# Patient Record
Sex: FEMALE | Race: WHITE | HISPANIC OR LATINO | URBAN - METROPOLITAN AREA
[De-identification: names, ages, dates, MRNs, and addresses within clinical notes are randomized per-mention and may not be internally consistent; named-entity substitution may affect disease eponyms.]

---

## 2017-12-18 ENCOUNTER — OFFICE VISIT (OUTPATIENT)
Dept: NEUROSURGERY | Facility: CLINIC | Age: 37
End: 2017-12-18

## 2017-12-18 VITALS
WEIGHT: 184.4 LBS | DIASTOLIC BLOOD PRESSURE: 72 MMHG | TEMPERATURE: 97.8 F | HEIGHT: 69 IN | SYSTOLIC BLOOD PRESSURE: 102 MMHG | BODY MASS INDEX: 27.31 KG/M2

## 2017-12-18 DIAGNOSIS — G89.29 CHRONIC NONINTRACTABLE HEADACHE, UNSPECIFIED HEADACHE TYPE: ICD-10-CM

## 2017-12-18 DIAGNOSIS — R51.9 CHRONIC NONINTRACTABLE HEADACHE, UNSPECIFIED HEADACHE TYPE: ICD-10-CM

## 2017-12-18 DIAGNOSIS — D32.9 MENINGIOMA (HCC): Primary | ICD-10-CM

## 2017-12-18 PROCEDURE — 99244 OFF/OP CNSLTJ NEW/EST MOD 40: CPT | Performed by: NEUROLOGICAL SURGERY

## 2017-12-18 RX ORDER — ALPRAZOLAM 0.5 MG/1
TABLET ORAL
Refills: 0 | COMMUNITY
Start: 2017-12-04 | End: 2022-06-13 | Stop reason: SDUPTHER

## 2017-12-18 RX ORDER — LEVONORGESTREL / ETHINYL ESTRADIOL AND ETHINYL ESTRADIOL 150-30(84)
KIT ORAL
COMMUNITY
Start: 2017-10-10 | End: 2022-03-11

## 2017-12-18 RX ORDER — FLUTICASONE PROPIONATE 50 MCG
SPRAY, SUSPENSION (ML) NASAL
Refills: 1 | COMMUNITY
Start: 2017-10-10 | End: 2022-03-11 | Stop reason: CLARIF

## 2017-12-18 RX ORDER — HYDROCODONE BITARTRATE AND ACETAMINOPHEN 7.5; 325 MG/1; MG/1
TABLET ORAL
COMMUNITY
Start: 2017-11-07 | End: 2019-11-11

## 2017-12-18 RX ORDER — CYCLOBENZAPRINE HCL 10 MG
TABLET ORAL
Refills: 0 | COMMUNITY
Start: 2017-11-27 | End: 2018-05-14

## 2017-12-18 RX ORDER — BUTALBITAL, ACETAMINOPHEN AND CAFFEINE 50; 325; 40 MG/1; MG/1; MG/1
TABLET ORAL
Refills: 0 | COMMUNITY
Start: 2017-12-10 | End: 2018-05-14

## 2017-12-18 RX ORDER — FOLIC ACID 1 MG/1
TABLET ORAL
Refills: 11 | COMMUNITY
Start: 2017-12-01 | End: 2018-05-14

## 2017-12-18 NOTE — PROGRESS NOTES
Subjective     Chief Complaint: Meningioma    Patient ID: Sylvia Douglass is a 37 y.o. female seen for consultation today at the request of  Lisset Vickers*    Headache    This is a chronic problem. The current episode started more than 1 year ago. The problem occurs intermittently. The problem has been waxing and waning. The pain is located in the bilateral region. The pain does not radiate. The pain quality is similar to prior headaches. The quality of the pain is described as aching and pulsating. The pain is at a severity of 10/10. The pain is severe. Associated symptoms include back pain, dizziness, neck pain, photophobia and vomiting. Pertinent negatives include no abdominal pain, coughing, ear pain, eye pain, eye redness, fever, hearing loss, nausea, numbness, rhinorrhea, seizures, sinus pressure, sore throat, tinnitus or weakness. Nothing aggravates the symptoms. Treatments tried: Marijuana. The treatment provided significant relief. Her past medical history is significant for migraine headaches.       This is a 37-year-old woman who presents to my clinic with chief complaints of migraines, dizziness, and an asymptomatic meningioma.  She was initially diagnosed with a meningioma in 2012.  She was followed at the [facility] with annual MRIs.  She has some neurological complaints which are probably not related to her meningioma.  She has a long-standing history of migraines, which have gotten somewhat worse over the course of the last few years.  She also endorses occasional dizziness/lightheadedness, for example when she bends over to tie her shoes.  She has refused to take migraine medicine or pain medicine in the past, and self medicated with marijuana.  She states that with regular marijuana use, she is able to control her migraine symptoms completely.    She denies any seizures, or strokelike symptoms.  She did have Bell's palsy on the right side when she was younger, and has been  left with a very subtle right-sided facial droop.    The following portions of the patient's history were reviewed and updated as appropriate: allergies, current medications, past family history, past medical history, past social history, past surgical history and problem list.    Family history:   Family History   Problem Relation Age of Onset   • Stroke Mother    • Dementia Maternal Grandmother    • Cancer Maternal Grandfather        Social history:   Social History     Social History   • Marital status: Unknown     Spouse name: N/A   • Number of children: N/A   • Years of education: N/A     Occupational History   • Not on file.     Social History Main Topics   • Smoking status: Never Smoker   • Smokeless tobacco: Never Used   • Alcohol use No   • Drug use: Yes     Special: Marijuana   • Sexual activity: Defer     Other Topics Concern   • Not on file     Social History Narrative   • No narrative on file       Review of Systems   Constitutional: Positive for activity change, diaphoresis, fatigue and unexpected weight change. Negative for appetite change, chills and fever.   HENT: Positive for drooling. Negative for congestion, dental problem, ear discharge, ear pain, facial swelling, hearing loss, mouth sores, nosebleeds, postnasal drip, rhinorrhea, sinus pressure, sneezing, sore throat, tinnitus, trouble swallowing and voice change.    Eyes: Positive for photophobia and visual disturbance. Negative for pain, discharge, redness and itching.   Respiratory: Negative for apnea, cough, choking, chest tightness, shortness of breath, wheezing and stridor.    Cardiovascular: Negative for chest pain, palpitations and leg swelling.   Gastrointestinal: Positive for vomiting. Negative for abdominal distention, abdominal pain, anal bleeding, blood in stool, constipation, diarrhea, nausea and rectal pain.   Endocrine: Negative for cold intolerance, heat intolerance, polydipsia, polyphagia and polyuria.   Genitourinary: Negative  "for decreased urine volume, difficulty urinating, dysuria, enuresis, flank pain, frequency, genital sores, hematuria and urgency.   Musculoskeletal: Positive for back pain, neck pain and neck stiffness. Negative for arthralgias, gait problem, joint swelling and myalgias.   Skin: Negative for color change, pallor, rash and wound.   Allergic/Immunologic: Positive for environmental allergies. Negative for food allergies and immunocompromised state.   Neurological: Positive for dizziness, speech difficulty, light-headedness and headaches. Negative for tremors, seizures, syncope, facial asymmetry, weakness and numbness.   Hematological: Negative for adenopathy. Bruises/bleeds easily.   Psychiatric/Behavioral: Negative for agitation, behavioral problems, confusion, decreased concentration, dysphoric mood, hallucinations, self-injury, sleep disturbance and suicidal ideas. The patient is not nervous/anxious and is not hyperactive.        Objective   Blood pressure 102/72, temperature 97.8 °F (36.6 °C), height 175.3 cm (69\"), weight 83.6 kg (184 lb 6.4 oz).  Body mass index is 27.23 kg/(m^2).    Physical Exam   Constitutional: She is oriented to person, place, and time. She appears well-developed and well-nourished.  Non-toxic appearance. No distress.   HENT:   Head: Normocephalic and atraumatic.   Mouth/Throat: Oropharynx is clear and moist.   Eyes: EOM are normal. Pupils are equal, round, and reactive to light. Right eye exhibits no discharge. Left eye exhibits no discharge.   Neck: Phonation normal. No JVD present. No tracheal deviation present. No thyromegaly present.   Cardiovascular: Normal rate and regular rhythm.    Pulmonary/Chest: Effort normal. No stridor. No respiratory distress. She has no wheezes.   Abdominal: Soft. Normal appearance. She exhibits no distension. There is no tenderness.   Musculoskeletal: She exhibits no edema.   Muscle Group     L          R  Deltoid                5          5  Bicep           "        5          5  Tricep                 5          5                       5          5  Hand IO              5          5  Hip Flexor           5          5  Knee Extensor   5          5     ADF                    5          5  APF                    5          5      Neurological: She is alert and oriented to person, place, and time. She has normal reflexes. She displays normal reflexes. No cranial nerve deficit or sensory deficit. She exhibits normal muscle tone. She displays a negative Romberg sign. Coordination and gait normal. GCS eye subscore is 4. GCS verbal subscore is 5. GCS motor subscore is 6.   Reflex Scores:       Tricep reflexes are 2+ on the right side and 2+ on the left side.       Bicep reflexes are 2+ on the right side and 2+ on the left side.       Brachioradialis reflexes are 2+ on the right side and 2+ on the left side.       Patellar reflexes are 2+ on the right side and 2+ on the left side.       Achilles reflexes are 2+ on the right side and 2+ on the left side.  CN II-XII grossly intact with the exception of very mild right peripheral cranial nerve VII asymmetry    No pronator drift. FTN testing performed without dysmetria or bradykinesia.   Skin: Skin is warm and dry. She is not diaphoretic. No erythema.   Psychiatric: She has a normal mood and affect. Her speech is normal and behavior is normal. Judgment and thought content normal.   Nursing note and vitals reviewed.        Assessment/Plan     Independent Review of Radiographic Studies:      Available for my review is a MRI of the brain with and without contrast that was performed on 8/1/12, 6/15/15, and 9/8/17.  There is a small, homogeneously enhancing lesion which is situated adjacent to the superior sagittal sinus on the left side, and the posterior left frontal lobe.  The dimensions on the study from 8/1/12 are 9.3 x 7.9 x 7.9 mm.  The dimensions on the study from 6/15/15 are 1.3 x 1.19 mm.  The dimensions on the study from  9/8/17 are 1.01 x 1.08 x 1.03 cm     Medical Decision Making:      This is a 37-year-old woman with an asymptomatic meningioma that may have grown very slightly from 2012 2 2015, but appears to have stabilized in terms of any growth over the course of the last 2 years.  Her complaints and symptoms are not related to this very small meningioma, which is not causing any radiographic evidence of intracranial mass effect.    She could reasonably have this meningioma removed if it is traumatic are concerning to her, however at this point I'm comfortable with continuing her annual MRIs.  I reviewed the signs and symptoms of rapid tumor growth, including the signs and symptoms of intracranial mass effect and seizures.    Otherwise, my recommendations are for her to finish her grad school, and get established with a headache neurologist here in town who can help get her migraine/chronic daily headaches under control.    If she elects to proceed with surgery, I asked her to contact my office, otherwise I think it's reasonable to repeat a MRI of the brain with and without contrast in September 2018.    I'm happy to follow her for her meningioma from here on out, and it sounds like [facility] will [facility]    Sylvia was seen today for brain tumor.    Diagnoses and all orders for this visit:    Meningioma  -     MRI Brain With & Without Contrast; Future    Chronic nonintractable headache, unspecified headache type  -     Ambulatory Referral to Neurology    Other orders  -     SCANNED - IMAGING  -     SCANNED - LABS      Return in about 1 year (around 12/18/2018).           This document signed by DAPHNEY Wright MD December 18, 2017 4:06 PM

## 2018-03-05 ENCOUNTER — TELEPHONE (OUTPATIENT)
Dept: NEUROLOGY | Facility: CLINIC | Age: 38
End: 2018-03-05

## 2018-03-05 NOTE — TELEPHONE ENCOUNTER
----- Message from Alexandrea Gupta sent at 3/5/2018 11:17 AM EST -----  Regarding: JESSICAHenrry HAYDENMORILLO  NEW PATIENT HAD TO RESCHEDULE, AND WAS VERY UPSET.  SHE HAS HAD HEADACHES, AND CAN'T AFFORD A ER VISIT.  SHE STRESSED THAT SHE IS NOT WANTING TO TAKE PILLS, THAT SHE WOULD LIKE TO RESOLVE THE ISSUES.  I TOLD HER I WOULD PASS THAT INFORMATION ALONG.

## 2018-03-07 NOTE — TELEPHONE ENCOUNTER
Unfortunately she can call every day if she would like to see if we have a cancellation. Provider had an uncontrollable emergency and I am sorry she had to be rescheduled but it was unavoidable.

## 2018-03-08 ENCOUNTER — TELEPHONE (OUTPATIENT)
Dept: NEUROSURGERY | Facility: CLINIC | Age: 38
End: 2018-03-08

## 2018-03-08 DIAGNOSIS — R51.9 FREQUENT HEADACHES: Primary | ICD-10-CM

## 2018-03-08 NOTE — TELEPHONE ENCOUNTER
She'll be referred to any neurologist who deals with headaches.    Sae or Keira would be up other good options

## 2018-03-08 NOTE — TELEPHONE ENCOUNTER
Patient called back and wanted to know if there was a reason why Dr. Wright referred her to Niki Payan? Her appointment was cancelled at the last minute and she can't be seen again till May.   Sylvia has been having really bad headaches and has missed a lot of work and had to use FMLA. She wants to know if there is anything we can do to get her seen sooner so she doesn't have to keep missing work.

## 2018-03-08 NOTE — TELEPHONE ENCOUNTER
Provider:  Kyle  Caller: Sylvia Douglass  Time of call:   01:32 PM  Phone #:  [phone]  Last visit:   12/18/17  Next visit: none    Reason for call:       Pt called in and left  that she has a question about the referral that Dr. Wright placed, no details.     Called pt back, went straight to , left message requesting that she call back and leave more detailed vm

## 2018-03-09 ENCOUNTER — TELEPHONE (OUTPATIENT)
Dept: NEUROLOGY | Facility: CLINIC | Age: 38
End: 2018-03-09

## 2018-03-09 ENCOUNTER — OFFICE VISIT (OUTPATIENT)
Dept: NEUROLOGY | Facility: CLINIC | Age: 38
End: 2018-03-09

## 2018-03-09 VITALS
WEIGHT: 170 LBS | BODY MASS INDEX: 25.18 KG/M2 | HEIGHT: 69 IN | HEART RATE: 68 BPM | SYSTOLIC BLOOD PRESSURE: 117 MMHG | DIASTOLIC BLOOD PRESSURE: 73 MMHG | OXYGEN SATURATION: 100 %

## 2018-03-09 DIAGNOSIS — G43.719 INTRACTABLE CHRONIC MIGRAINE WITHOUT AURA AND WITHOUT STATUS MIGRAINOSUS: ICD-10-CM

## 2018-03-09 DIAGNOSIS — H53.9 TRANSIENT VISION DISTURBANCE: Primary | ICD-10-CM

## 2018-03-09 PROCEDURE — 99204 OFFICE O/P NEW MOD 45 MIN: CPT | Performed by: NURSE PRACTITIONER

## 2018-03-09 RX ORDER — CETIRIZINE HYDROCHLORIDE 5 MG/1
5 TABLET ORAL DAILY
COMMUNITY
End: 2022-03-11

## 2018-03-09 NOTE — TELEPHONE ENCOUNTER
I do FMLA for migraines but it is only PRN. We don't do disability for migraines. Also the [facility] is the more holistic headache clinic here in town and she can call for an appointment as a self-referral. She was wanting this info. thanks

## 2018-03-09 NOTE — TELEPHONE ENCOUNTER
Pt said she was currrently seeing Dr. Loredo and he was handling her FMLA paperwork but if she were to stop seeing him would you be the one to take over the paperwork?

## 2018-03-09 NOTE — PROGRESS NOTES
"Subjective:     Patient ID: Sylvia Douglass is a 37 y.o. female.    CC:   Chief Complaint   Patient presents with   • Headache       HPI:   History of Present Illness   This is a 37-year-old female who presents to establish care with new neurologist for chronic headaches and migraines since childhood.  She was referred by Dr. Harley Wright with neurosurgery after being evaluated for meningioma found originally on the MRI of her brain in 2014 and appeared stable on her MRI of her brain from 9/8/2017 in the left side as well as chronic minimal small vessel ischemic changes.  For now the plan is to repeat her MRI of the brain in September 2018 and follow-up with Dr. Wright at that time.  She tells me that she has had migraine headaches since she was a child.  She tells me that she has previously been treated by multiple neurologists and had multiple CT and MRI scans.  She tells me from age [age] her migraines stopped completely when she started smoking marijuana.  She tells me over the past one and half to 2 years her headaches have returned with a vengeance.  She tells me that she was previously seen at  neurology and was recommended that she have surgical removal of her meningioma, but with second opinion she is going to wait on this.  She tells me that she is having constant migraines over the past one and half to 2 years.  She tells me that she is sensitive to light, sound, not sleeping and not eating foods make her symptoms worse.  She tells me that recently she is having even more concerning symptoms of intermittent slurred speech, intermittent dizziness, intermittent near passing out or \"blackout spells\".  She tells me that her vision intermittently becomes blurred.  She tells me that her symptoms typically are not during her worst migraines but now she is having headaches almost every day so it is difficult to tell at times.  She tells me that she has been diagnosed with TMJ and has received acupuncture " in the past for this.  She tells me that she has previously tried Imitrex nasal, oral and injectables.  She tells me she tried propranolol, Depakote, amitriptyline, Fioricet, Relpax and currently taking Lortab by Dr. Loredo with comprehensive pain management.  She has also tried CoQ10 & riboflavin but has not tried magnesium.  She is unable to take Topamax since she has a history of kidney stones.  She is wondering about using CBD oil for migraine prevention.  She tells me she also has dizziness usually when she is standing but not necessarily related to position changes.  She tells me she has difficulty with concentrating.  She tells me that her headaches used to always be in the front and now they've moved all over her head.  She has throbbing, pulsating, pressure, squeezing, sharp, stabbing, dull and achy pain.  She tells me she has a headache every day and that her severe migraines last 3 or more days at 10 out of 10 pain.  She tells me that she is not really looking for medications but more natural alternatives to treat her headaches.  She tells me that her mother and grandmother do have a history of mini strokes.  She tells me that she does have severe anxiety.  She did bring today a copy of her Pharmacogenomic testing and results.  She tells me that she has multiple allergies to medications and sensitivities to medications and is fearful to take several medications due to reactions in the past.  She does have MTHFR and does take folic acid for this. Has a history of Bell's Palsy as a teenager. History of Gastric Bypass in 2004 and cannot take NSAIDs.    This patient experiences 30/30 headache days per month with at least 20 days being severe migraine headaches. Migraine headaches last >4hour/day and have been present for greater than 6 consecutive months. Characteristics of migraines include at least 2 of the following: unilateral, pulsating, moderate to severe intensity, worsened by physical exertion,  photophobia, phonophobia, nausea and/or vomiting. Patient has tried and failed medications for migraine prevention for 3 months or greater: Examples: Topamax (cannot take due to kidney stones), Amitriptyline, Imitrex, Propranolol, Relpax, Maxalt, SSRIs, BBs, AEDs, NSAIDs, Muscle Relaxers, Tylenol etc.    The following portions of the patient's history were reviewed and updated as appropriate: allergies, current medications, past family history, past medical history, past social history, past surgical history and problem list.    Past Medical History:   Diagnosis Date   • Benign meningioma of brain 2014    followed by Neurosurgery   • Endometriosis    • Kidney stones        Past Surgical History:   Procedure Laterality Date   • ANKLE SURGERY     • GASTRIC BYPASS     • HX OVARIAN CYSTECTOMY     • RENAL ARTERY STENT         Social History     Social History   • Marital status: Single     Spouse name: N/A   • Number of children: N/A   • Years of education: N/A     Occupational History   • Not on file.     Social History Main Topics   • Smoking status: Never Smoker   • Smokeless tobacco: Never Used   • Alcohol use No   • Drug use: Yes     Special: Marijuana   • Sexual activity: Defer     Other Topics Concern   • Not on file     Social History Narrative       Family History   Problem Relation Age of Onset   • Stroke Mother    • Dementia Maternal Grandmother    • Cancer Maternal Grandfather         Review of Systems   Constitutional: Negative for chills, fatigue, fever and unexpected weight change.   HENT: Negative for ear pain, hearing loss, nosebleeds, rhinorrhea and sore throat.    Eyes: Positive for visual disturbance. Negative for photophobia, pain, discharge and itching.        Blurred vision, vision going black     Respiratory: Negative.  Negative for cough, chest tightness, shortness of breath and wheezing.    Cardiovascular: Negative.  Negative for chest pain, palpitations and leg swelling.   Gastrointestinal:  Negative.  Negative for abdominal pain, blood in stool, constipation, diarrhea, nausea and vomiting.   Endocrine: Negative.    Genitourinary: Negative.  Negative for dysuria, frequency, hematuria and urgency.   Musculoskeletal: Positive for gait problem. Negative for arthralgias, back pain, joint swelling, myalgias, neck pain and neck stiffness.   Skin: Negative.  Negative for rash and wound.   Allergic/Immunologic: Negative.  Negative for environmental allergies and food allergies.   Neurological: Positive for dizziness, speech difficulty, weakness, light-headedness, numbness and headaches. Negative for tremors, seizures and syncope.   Hematological: Negative.  Negative for adenopathy. Does not bruise/bleed easily.   Psychiatric/Behavioral: Positive for dysphoric mood. Negative for agitation, confusion, decreased concentration, hallucinations, sleep disturbance and suicidal ideas. The patient is nervous/anxious.         Objective:    Neurologic Exam     Mental Status   Oriented to person, place, and time.   Registration: recalls 3 of 3 objects. Recall at 5 minutes: recalls 3 of 3 objects. Follows 3 step commands.   Attention: normal. Concentration: normal.   Speech: speech is normal   Level of consciousness: alert  Knowledge: good and consistent with education. Able to perform simple calculations.   Able to name object. Able to read. Able to repeat. Able to write. Normal comprehension.     Cranial Nerves   Cranial nerves II through XII intact.     Motor Exam   Muscle bulk: normal  Overall muscle tone: normal    Strength   Strength 5/5 throughout.     Sensory Exam   Light touch normal.   Vibration normal.   Proprioception normal.   Pinprick normal.     Gait, Coordination, and Reflexes     Gait  Gait: normal    Coordination   Romberg: negative  Finger to nose coordination: normal  Heel to shin coordination: normal    Tremor   Resting tremor: absent  Intention tremor: absent  Action tremor: absent    Reflexes   Right  brachioradialis: 2+  Left brachioradialis: 2+  Right biceps: 2+  Left biceps: 2+  Right triceps: 2+  Left triceps: 2+  Right patellar: 2+  Left patellar: 2+  Right achilles: 2+  Left achilles: 2+  Right : 2+  Left : 2+  Right plantar: normal  Left plantar: normal  Right Blount: absent  Left Blount: absent  Right ankle clonus: absent  Left ankle clonus: absent      Physical Exam   Constitutional: She is oriented to person, place, and time. She appears well-developed and well-nourished.   Eyes:   Fundoscopic exam:       The right eye shows no papilledema. The right eye shows red reflex.        The left eye shows no papilledema. The left eye shows red reflex.   Cardiovascular: Normal rate, regular rhythm, S1 normal, S2 normal and normal heart sounds.    Pulmonary/Chest: Effort normal and breath sounds normal.   Neurological: She is oriented to person, place, and time. She has normal strength. She has a normal Finger-Nose-Finger Test, a normal Heel to Landers Test and a normal Romberg Test. Gait normal.   Reflex Scores:       Tricep reflexes are 2+ on the right side and 2+ on the left side.       Bicep reflexes are 2+ on the right side and 2+ on the left side.       Brachioradialis reflexes are 2+ on the right side and 2+ on the left side.       Patellar reflexes are 2+ on the right side and 2+ on the left side.       Achilles reflexes are 2+ on the right side and 2+ on the left side.  Psychiatric: Her speech is normal and behavior is normal. Judgment and thought content normal. Her mood appears anxious. Cognition and memory are normal.   Tearful     Assessment/Plan:       Sylvia was seen today for headache.    Diagnoses and all orders for this visit:    Transient vision disturbance  -     EEG Awake or Drowsy Routine  -     Visual Evoked Potential Test  -     Ambulatory Referral to Ophthalmology    Intractable chronic migraine without aura and without status migrainosus  Comments:  Mag oxide 400mg QHS,  Riboflavin 200mg QHS and COQ10 200mg QHS for prevention OTC. Will get her authorized for Botox. [facility].  Orders:  -     EEG Awake or Drowsy Routine  -     Visual Evoked Potential Test  -     Ambulatory Referral to Ophthalmology         We will get her authorized for Botox.  She will start over-the-counter preventive supplements.  I've also recommended she contact the [facility] for other treatment options.  We will get her and ophthalmology for intermittent vision changes.  We will get an EEG and visual focus potentials rule out any other etiology of her new symptoms.  She does have intractable migraines and her symptoms can be associated with the headache she is experiencing.  We'll try to get her scheduled for Botox as soon as possible.  We will follow-up with her for routine visit in 3 months.  She reports having routine labs with her PCP in the Fall of 2017.  I have recommended Botox using FDA approved protocol for chronic migraine prevention resistant to prior regimens as listed above. We have discussed risk and benefits of this procedure and common side effects including headache, neck pain, neck stiffness or weakness, ptosis, flu-like symptoms as well as more serious possible adverse effects including possible dysphagia, respiratory distress or even death (death has only been reported once with adults for Botox for migraines in another state when mixed with lidocaine solution which we do not use lidocaine solution in our practice for mixing Botox). Verbalizes understanding, accepts risks and agrees with moving forward with Botox injections for chronic migraine prevention. Reviewed medications, potential side effects and signs and symptoms to report. Discussed risk versus benefits of treatment plan with patient and/or family-including medications, labs and radiology that may be ordered. Addressed questions and concerns during visit. Patient and/or family verbalized understanding  and agree with plan.    During this visit the following were done:  Labs Reviewed [x]    Labs Ordered []    Radiology Reports Reviewed [x]    Radiology Ordered []    PCP Records Reviewed []    Referring Provider Records Reviewed [x]    ER Records Reviewed []    Hospital Records Reviewed []    History Obtained From Family []    Radiology Images Reviewed [x]    Other Reviewed []    Records Requested []      EMR Dragon/Transcription Disclaimer:  Much of this encounter note is an electronic transcription of spoken language to printed text. Electronic transcription of spoken language may permit erroneous words or phrases to be inadvertently transcribed. Although I have reviewed the note for such errors, some may still exist in this documentation.      Niki Payan, APRN  3/9/2018

## 2018-03-09 NOTE — TELEPHONE ENCOUNTER
I called and spoke with pt letting her know that you only do FMLAfor migraines but only as needed and not for disability purposes and gave her the [facility] address, phone number and told her she could call and schedule as self referral.  She verbalized understanding

## 2018-03-23 ENCOUNTER — TELEPHONE (OUTPATIENT)
Dept: NEUROLOGY | Facility: CLINIC | Age: 38
End: 2018-03-23

## 2018-03-23 ENCOUNTER — HOSPITAL ENCOUNTER (OUTPATIENT)
Dept: NEUROLOGY | Facility: HOSPITAL | Age: 38
Discharge: HOME | End: 2018-03-23
Admitting: NURSE PRACTITIONER

## 2018-03-23 PROCEDURE — 95819 EEG AWAKE AND ASLEEP: CPT

## 2018-03-23 NOTE — TELEPHONE ENCOUNTER
I spoke with pt letting her know that her EEG results show no signs of seizures and she should f/u as scheduled and she ask about opthalmology appt and I told her I faxed over all information to Dr. Novak. Arthur Conn and she told me her appt for the visual evoked potential test at [facility] was changed to 4-4-2018

## 2018-03-23 NOTE — TELEPHONE ENCOUNTER
Please notify patient her EEG was Normal showing no signs of seizures. We will f/u as scheduled in office. thanks

## 2018-05-14 ENCOUNTER — HOSPITAL ENCOUNTER (EMERGENCY)
Facility: HOSPITAL | Age: 38
Discharge: HOME | End: 2018-05-14
Attending: EMERGENCY MEDICINE | Admitting: EMERGENCY MEDICINE

## 2018-05-14 ENCOUNTER — APPOINTMENT (OUTPATIENT)
Dept: CT IMAGING | Facility: HOSPITAL | Age: 38
End: 2018-05-14

## 2018-05-14 VITALS
TEMPERATURE: 98 F | HEIGHT: 69 IN | SYSTOLIC BLOOD PRESSURE: 110 MMHG | OXYGEN SATURATION: 98 % | DIASTOLIC BLOOD PRESSURE: 85 MMHG | WEIGHT: 164 LBS | HEART RATE: 85 BPM | BODY MASS INDEX: 24.29 KG/M2 | RESPIRATION RATE: 16 BRPM

## 2018-05-14 DIAGNOSIS — S00.83XA CONTUSION OF FACE, INITIAL ENCOUNTER: ICD-10-CM

## 2018-05-14 DIAGNOSIS — G44.319 ACUTE POST-TRAUMATIC HEADACHE, NOT INTRACTABLE: ICD-10-CM

## 2018-05-14 DIAGNOSIS — S39.012A LUMBAR STRAIN, INITIAL ENCOUNTER: ICD-10-CM

## 2018-05-14 DIAGNOSIS — Z04.1 ENCOUNTER FOR EXAMINATION FOLLOWING MOTOR VEHICLE COLLISION (MVC): ICD-10-CM

## 2018-05-14 DIAGNOSIS — S16.1XXA CERVICAL STRAIN, ACUTE, INITIAL ENCOUNTER: Primary | ICD-10-CM

## 2018-05-14 LAB
B-HCG UR QL: NEGATIVE
INTERNAL NEGATIVE CONTROL: NEGATIVE
INTERNAL POSITIVE CONTROL: POSITIVE
Lab: NORMAL

## 2018-05-14 PROCEDURE — 70450 CT HEAD/BRAIN W/O DYE: CPT

## 2018-05-14 PROCEDURE — 72131 CT LUMBAR SPINE W/O DYE: CPT

## 2018-05-14 PROCEDURE — 72125 CT NECK SPINE W/O DYE: CPT

## 2018-05-14 PROCEDURE — 99284 EMERGENCY DEPT VISIT MOD MDM: CPT

## 2018-05-14 RX ORDER — CYCLOBENZAPRINE HCL 10 MG
10 TABLET ORAL 3 TIMES DAILY
Qty: 15 TABLET | Refills: 0 | Status: SHIPPED | OUTPATIENT
Start: 2018-05-14

## 2018-05-14 RX ORDER — ACETAMINOPHEN 500 MG
1000 TABLET ORAL ONCE
Status: COMPLETED | OUTPATIENT
Start: 2018-05-14 | End: 2018-05-14

## 2018-05-14 RX ADMIN — ACETAMINOPHEN 1000 MG: 500 TABLET, FILM COATED ORAL at 10:22
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
## 2018-05-14 NOTE — DISCHARGE INSTRUCTIONS
Rest.  Take your Tylenol #3 as prescribed for pain.  Flexeril for muscle pain.  Follow up with your PCP as needed.

## 2018-06-06 ENCOUNTER — TELEPHONE (OUTPATIENT)
Dept: NEUROLOGY | Facility: CLINIC | Age: 38
End: 2018-06-06

## 2018-06-06 NOTE — TELEPHONE ENCOUNTER
Pt called wanting to know the visual evoke results and I do not see them in chart so I called and request them from [facility] and will call pt when we have the results

## 2018-06-09 ENCOUNTER — TELEPHONE (OUTPATIENT)
Dept: NEUROLOGY | Facility: CLINIC | Age: 38
End: 2018-06-09

## 2018-06-10 NOTE — TELEPHONE ENCOUNTER
Please notify patient we finally received her visual evoked potential testing results completed at [facility] on 4/4/18-this was completely normal. thanks

## 2018-06-11 NOTE — TELEPHONE ENCOUNTER
Pt called and I let her know the results from her visual evoke were normal and she verbalized understanding

## 2018-06-11 NOTE — TELEPHONE ENCOUNTER
I called pt to let her know the results of her visual evoke testing but she was in a meeting and will call office back for those results.

## 2018-06-21 ENCOUNTER — TELEPHONE (OUTPATIENT)
Dept: NEUROSURGERY | Facility: CLINIC | Age: 38
End: 2018-06-21

## 2018-06-21 DIAGNOSIS — H53.9 TRANSIENT VISION DISTURBANCE: Primary | ICD-10-CM

## 2018-06-21 DIAGNOSIS — D32.0 BENIGN MENINGIOMA OF BRAIN (HCC): ICD-10-CM

## 2018-06-21 NOTE — TELEPHONE ENCOUNTER
"Provider:  Kyle  Caller: Sylvia Douglass  Time of call:   01:32 PM  Phone #:  [phone]  Last visit:   12/18/17  Next visit: none             Reason:   Pt called to report \"odd sx\"   -returned call no answer, LVM to return call with more info.   "

## 2018-06-21 NOTE — TELEPHONE ENCOUNTER
This patient has a meningioma that has been asymptomatic. The last scan done was in Sept 2017 and Dr. Wright recommended a one year follow up. These symptoms are very likely not related, but it would be worthwhile to repeat her MRI and see her in the next week or so to review it. I have entered the order. Please get her on Dr. Wright's schedule for some time soon, after the scan has been done.

## 2018-06-21 NOTE — TELEPHONE ENCOUNTER
Pt reports an increase in general clumsiness as of late. Most importantly she reports that her right eye will not focus automatically sometimes (she has to look away and blink some before regaining focus). She says that this lasts 10-15 minutes and it used to be both eyes for only 10-15 seconds it is now the one eye for much longer. I've added her ophthalmologist to her care team.

## 2018-06-27 ENCOUNTER — APPOINTMENT (OUTPATIENT)
Dept: MRI IMAGING | Facility: HOSPITAL | Age: 38
End: 2018-06-27

## 2018-06-27 ENCOUNTER — OFFICE VISIT (OUTPATIENT)
Dept: NEUROSURGERY | Facility: CLINIC | Age: 38
End: 2018-06-27

## 2018-06-27 VITALS
HEIGHT: 69 IN | SYSTOLIC BLOOD PRESSURE: 120 MMHG | DIASTOLIC BLOOD PRESSURE: 68 MMHG | WEIGHT: 169.2 LBS | TEMPERATURE: 99.2 F | BODY MASS INDEX: 25.06 KG/M2

## 2018-06-27 DIAGNOSIS — D32.9 MENINGIOMA (HCC): Primary | ICD-10-CM

## 2018-06-27 PROCEDURE — 99213 OFFICE O/P EST LOW 20 MIN: CPT | Performed by: NEUROLOGICAL SURGERY

## 2018-06-27 NOTE — PROGRESS NOTES
Subjective     Chief Complaint: Brain tumor    Patient ID: Sylvia Douglass is a 37 y.o. female is here today for follow-up.    Headache    This is a chronic problem. The current episode started more than 1 year ago. The problem occurs intermittently. The problem has been waxing and waning. The pain is located in the bilateral region. The pain does not radiate. The pain quality is similar to prior headaches. The quality of the pain is described as aching and pulsating. The pain is at a severity of 10/10. The pain is severe. Associated symptoms include back pain, neck pain and sinus pressure. Pertinent negatives include no abdominal pain, coughing, dizziness, ear pain, eye pain, eye redness, fever, hearing loss, nausea, numbness, photophobia, rhinorrhea, seizures, sore throat, tinnitus, vomiting or weakness. Nothing aggravates the symptoms. Treatments tried: Marijuana. The treatment provided significant relief. Her past medical history is significant for migraine headaches.       This is a 37-year-old woman who I followed following for an asymptomatic, small convexity meningioma.  She recently has had a litany of neurological complaints, specifically some blurriness in her right eye.  This prompted her physicians to recommend a somewhat earlier than scheduled MRI of the brain and follow up with me.  She is endorsing blurry vision in her right eye.  She denies seizures, strokelike symptoms, or diplopia.    The following portions of the patient's history were reviewed and updated as appropriate: allergies, current medications, past family history, past medical history, past social history, past surgical history and problem list.    Family history:   Family History   Problem Relation Age of Onset   • Stroke Mother    • Dementia Maternal Grandmother    • Cancer Maternal Grandfather        Social history:   Social History     Social History   • Marital status: Single     Spouse name: N/A   • Number of children: N/A   •  Years of education: N/A     Occupational History   • Not on file.     Social History Main Topics   • Smoking status: Never Smoker   • Smokeless tobacco: Never Used   • Alcohol use No   • Drug use: Yes     Types: Marijuana   • Sexual activity: Defer     Other Topics Concern   • Not on file     Social History Narrative   • No narrative on file       Review of Systems   Constitutional: Positive for fatigue. Negative for activity change, appetite change, chills, diaphoresis, fever and unexpected weight change.   HENT: Positive for sinus pressure. Negative for congestion, dental problem, drooling, ear discharge, ear pain, facial swelling, hearing loss, mouth sores, nosebleeds, postnasal drip, rhinorrhea, sneezing, sore throat, tinnitus, trouble swallowing and voice change.    Eyes: Negative for photophobia, pain, discharge, redness, itching and visual disturbance.   Respiratory: Negative for apnea, cough, choking, chest tightness, shortness of breath, wheezing and stridor.    Cardiovascular: Negative for chest pain, palpitations and leg swelling.   Gastrointestinal: Negative for abdominal distention, abdominal pain, anal bleeding, blood in stool, constipation, diarrhea, nausea, rectal pain and vomiting.   Endocrine: Negative for cold intolerance, heat intolerance, polydipsia, polyphagia and polyuria.   Genitourinary: Negative for decreased urine volume, difficulty urinating, dysuria, enuresis, flank pain, frequency, genital sores, hematuria and urgency.   Musculoskeletal: Positive for back pain, myalgias, neck pain and neck stiffness. Negative for arthralgias, gait problem and joint swelling.   Skin: Negative for color change, pallor, rash and wound.   Allergic/Immunologic: Negative for environmental allergies, food allergies and immunocompromised state.   Neurological: Positive for headaches. Negative for dizziness, tremors, seizures, syncope, facial asymmetry, speech difficulty, weakness, light-headedness and numbness.  "  Hematological: Negative for adenopathy. Does not bruise/bleed easily.   Psychiatric/Behavioral: Negative for agitation, behavioral problems, confusion, decreased concentration, dysphoric mood, hallucinations, self-injury, sleep disturbance and suicidal ideas. The patient is not nervous/anxious and is not hyperactive.        Objective   Blood pressure 120/68, temperature 99.2 °F (37.3 °C), temperature source Temporal Artery , height 175.3 cm (69.02\"), weight 76.7 kg (169 lb 3.2 oz).  Body mass index is 24.97 kg/m².    Physical Exam   Constitutional: She is oriented to person, place, and time. She appears well-developed and well-nourished.  Non-toxic appearance. No distress.   HENT:   Head: Normocephalic and atraumatic.   Mouth/Throat: Oropharynx is clear and moist.   Eyes: EOM are normal. Pupils are equal, round, and reactive to light. Right eye exhibits no discharge. Left eye exhibits no discharge.   Neck: Phonation normal. No JVD present. No tracheal deviation present. No thyromegaly present.   Cardiovascular: Normal rate and regular rhythm.    Pulmonary/Chest: Effort normal. No stridor. No respiratory distress. She has no wheezes.   Abdominal: Soft. Normal appearance. She exhibits no distension. There is no tenderness.   Musculoskeletal: She exhibits no edema.   Muscle Group     L          R  Deltoid                5          5  Bicep                  5          5  Tricep                 5          5                       5          5  Hand IO              5          5  Hip Flexor           5          5  Knee Extensor   5          5     ADF                    5          5  APF                    5          5      Neurological: She is alert and oriented to person, place, and time. She has normal reflexes. She displays normal reflexes. No cranial nerve deficit or sensory deficit. She exhibits normal muscle tone. She displays a negative Romberg sign. Coordination and gait normal. GCS eye subscore is 4. GCS " verbal subscore is 5. GCS motor subscore is 6.   Reflex Scores:       Tricep reflexes are 2+ on the right side and 2+ on the left side.       Bicep reflexes are 2+ on the right side and 2+ on the left side.       Brachioradialis reflexes are 2+ on the right side and 2+ on the left side.       Patellar reflexes are 2+ on the right side and 2+ on the left side.       Achilles reflexes are 2+ on the right side and 2+ on the left side.  CN II-XII grossly intact with the exception of very mild right peripheral cranial nerve VII asymmetry    No pronator drift. FTN testing performed without dysmetria or bradykinesia.   Skin: Skin is warm and dry. She is not diaphoretic. No erythema.   Psychiatric: She has a normal mood and affect. Her speech is normal and behavior is normal. Judgment and thought content normal.   Nursing note and vitals reviewed.        Assessment/Plan     Independent Review of Radiographic Studies:      Available for my review is a MRI of the brain with and without contrast that was performed on 6/26/18.  Unfortunately, contrast-enhanced images in the axial plane were not performed all the way to the vertex, so the patient's meningioma is not able to be measured in all 3 planes as it was on her previous study.  Coronal reconstructions do demonstrate a homogeneously enhanced, durally based lesion which is eccentric to the left side just adjacent to the superior sagittal sinus.  I measured the maximum dimensions at approximately 10 mm.  In comparison to her prior MRI, and allowing for differences in technique, there is not appear to be significant change or progression in terms of the tumor size between her MRI from June 2018 and September 2017.    Medical Decision Making:      This is a 37-year-old woman with multiple neurological and somatic complaints.  I do not believe that any of her current symptomatology is related to her meningioma.  There does not appear to be any obvious progression, despite the  fact that the MRI she brought with her today is technically inadequate.  I did review the signs and symptoms of seizure and intracranial mass effect with her.  I directed her to contact my office with new or worsening symptoms, otherwise I would like to repeat a MRI of the brain with and without contrast in approximately one year.    ySlvia was seen today for brain tumor.    Diagnoses and all orders for this visit:    Meningioma  -     MRI Brain With & Without Contrast        Return in about 1 year (around 6/27/2019).           This document signed by DAPHNEY Wright MD June 27, 2018 4:33 PM

## 2018-07-16 DIAGNOSIS — D32.0 BENIGN MENINGIOMA OF BRAIN (HCC): ICD-10-CM

## 2018-07-16 DIAGNOSIS — H53.9 TRANSIENT VISION DISTURBANCE: ICD-10-CM

## 2018-08-15 ENCOUNTER — TELEPHONE (OUTPATIENT)
Dept: NEUROSURGERY | Facility: CLINIC | Age: 38
End: 2018-08-15

## 2018-08-15 NOTE — TELEPHONE ENCOUNTER
Pt called wanting to know if she could pick her disc back up.  I called her back to let her know I would put the disc up front and she could  it at her leisure.  I also let her know that I called [facility] to ask if the pt had had 2 separate scans or just 1 scan and 2 discs burned due to the sequencing not being performed all the way.  [facility] let me know that the pt had just 1 scan and had the 2 discs burned.  [facility] said they would be happy to scan her again for no charge and would call and let the pt know.  I made the pt aware of this while on the phone with her and she said she wasn't able to do that.  I let her know that that was between her and [facility] but I was happy to help her in any other way I can.  Pt said she would pick the disc up in the next week or so.

## 2018-08-17 NOTE — TELEPHONE ENCOUNTER
Pt called requesting to speak with you again pertaining to MRI, return call at your earliest convenience

## 2018-08-17 NOTE — TELEPHONE ENCOUNTER
Called pt back.  She states that [facility] is only willing to offer her a small amount of money back for the scan that was performed incorrectly.  Pt does not want to redo the MRI.  She states she does not want the exposure to the extra radiation.  Again I let her know that was between her and [facility].  She thanked me and said she will just not return to [facility] in the future.

## 2019-01-15 ENCOUNTER — TELEPHONE (OUTPATIENT)
Dept: NEUROSURGERY | Facility: CLINIC | Age: 39
End: 2019-01-15

## 2019-01-15 NOTE — TELEPHONE ENCOUNTER
Provider:  Kyle  Caller: pt   Time of call:   3pm  Phone #:  [phone]  Surgery:  NA  Surgery Date:  NA  Last visit:   6/27/2018  Next visit: [facility] COLLINS:         Reason for call:         Pt called earlier today wishing to speak with me.  I called pt back and she wanted to see if Dr. Wright had any recommendations for a neurologist that is pro cannabis and has a more holistic approach to treatment.  I will speak with Dr. Wright tomorrow about his.

## 2019-01-18 NOTE — TELEPHONE ENCOUNTER
Spoke frank Wright.  He let me know that since cannabis is not legal in the [facility] unfortunately there is no one he knows to recommend.      Pt informed me that she is still paying on her last MRI scan from June 2018. If Kyle would like for her to f/u again with MRI in June 2019 she does not know if she can afford it. At this point in time she would only be able to get a new MRI on a as needed basis.  I told her I would inform Kyle about this. I asked if she knew signs and symptoms of seizure and intracranial mass effect and she asked me to refresh her memory.  I let her know I would speak with Tig[facility] and get back to her just to make sure I gave her the correct information.

## 2019-01-21 NOTE — TELEPHONE ENCOUNTER
Called pt back with signs and symptoms of seizure and intracranial mass effect.  She verbalized understanding.

## 2019-06-21 ENCOUNTER — TELEPHONE (OUTPATIENT)
Dept: NEUROSURGERY | Facility: CLINIC | Age: 39
End: 2019-06-21

## 2019-06-21 NOTE — TELEPHONE ENCOUNTER
Our recommendation would be yearly follow-up with brain MRI with and without contrast.  However, the patient has made us aware that she is unable to afford this.  She has been educated on the signs and symptoms of seizure and intracranial mass-effect.  If she does not have any new symptoms, she may follow-up on an as-needed basis.  If she would feel more comfortable coming in for an office visit, I would be happy to see her in the office without imaging.       
Provider:  Kyle  Caller: aisha   Time of call:  102  Phone #:  [phone]  Last visit:   6/27/2018  Next visit: TBD      Reason for call:         Pt wants to know if she still needs to f/u for an annual checkup evven though they agreed that MRI scanning no longer warranted. Also, she would like to be referred to a neurologist that works with Dr. Wright to address her migraines.   
 used

## 2019-10-22 ENCOUNTER — TELEPHONE (OUTPATIENT)
Dept: NEUROSURGERY | Facility: CLINIC | Age: 39
End: 2019-10-22

## 2019-10-22 DIAGNOSIS — D32.9 MENINGIOMA (HCC): Primary | ICD-10-CM

## 2019-11-06 ENCOUNTER — HOSPITAL ENCOUNTER (OUTPATIENT)
Dept: MRI IMAGING | Facility: HOSPITAL | Age: 39
Discharge: HOME | End: 2019-11-06
Admitting: PHYSICIAN ASSISTANT

## 2019-11-06 DIAGNOSIS — D32.9 MENINGIOMA (HCC): ICD-10-CM

## 2019-11-06 PROCEDURE — 70553 MRI BRAIN STEM W/O & W/DYE: CPT

## 2019-11-06 PROCEDURE — 0 GADOBENATE DIMEGLUMINE 529 MG/ML SOLUTION: Performed by: PHYSICIAN ASSISTANT

## 2019-11-06 PROCEDURE — A9577 INJ MULTIHANCE: HCPCS | Performed by: PHYSICIAN ASSISTANT

## 2019-11-06 RX ADMIN — GADOBENATE DIMEGLUMINE 16 ML: 529 INJECTION, SOLUTION INTRAVENOUS at 10:31

## 2019-11-11 ENCOUNTER — OFFICE VISIT (OUTPATIENT)
Dept: NEUROSURGERY | Facility: CLINIC | Age: 39
End: 2019-11-11

## 2019-11-11 VITALS
DIASTOLIC BLOOD PRESSURE: 80 MMHG | WEIGHT: 189 LBS | SYSTOLIC BLOOD PRESSURE: 112 MMHG | BODY MASS INDEX: 27.99 KG/M2 | TEMPERATURE: 97.1 F | HEIGHT: 69 IN

## 2019-11-11 DIAGNOSIS — D32.9 MENINGIOMA (HCC): Primary | ICD-10-CM

## 2019-11-11 PROCEDURE — 99213 OFFICE O/P EST LOW 20 MIN: CPT | Performed by: NEUROLOGICAL SURGERY

## 2019-11-11 RX ORDER — PROMETHAZINE HYDROCHLORIDE 25 MG/1
TABLET ORAL EVERY 4 HOURS
COMMUNITY

## 2019-11-11 RX ORDER — FOLIC ACID 1 MG/1
TABLET ORAL
COMMUNITY
Start: 2019-10-30 | End: 2022-06-13

## 2019-11-11 RX ORDER — AZELASTINE 1 MG/ML
SPRAY, METERED NASAL
COMMUNITY
Start: 2019-11-01 | End: 2022-06-13

## 2019-11-11 RX ORDER — ESOMEPRAZOLE MAGNESIUM 40 MG/1
CAPSULE, DELAYED RELEASE ORAL
COMMUNITY
Start: 2019-06-30 | End: 2020-07-27

## 2019-11-11 RX ORDER — BUTALBITAL, ACETAMINOPHEN AND CAFFEINE 50; 325; 40 MG/1; MG/1; MG/1
TABLET ORAL
COMMUNITY
Start: 2019-10-15

## 2019-11-11 RX ORDER — PSEUDOEPHEDRINE HCL 120 MG/1
TABLET, FILM COATED, EXTENDED RELEASE ORAL EVERY 12 HOURS
COMMUNITY

## 2019-11-11 NOTE — PROGRESS NOTES
Subjective     Chief Complaint: Follow-up meningioma    Patient ID: Sylvia Douglass is a 39 y.o. female is here today for follow-up.    History of Present Illness    This is a 39-year-old woman with a very small, incidentally discovered convexity meningioma.  She presents today to discuss the results of her surveillance MRI.  She has chronic migraines and chronic photophobia.  She denies any changes in her headache symptoms since her last visit.  Additionally, she denies any numbness, tingling, weakness, or seizure-like activity.    The following portions of the patient's history were reviewed and updated as appropriate: allergies, current medications, past family history, past medical history, past social history, past surgical history and problem list.    Family history:   Family History   Problem Relation Age of Onset   • Stroke Mother    • Dementia Maternal Grandmother    • Cancer Maternal Grandfather        Social history:   Social History     Socioeconomic History   • Marital status: Single     Spouse name: Not on file   • Number of children: Not on file   • Years of education: Not on file   • Highest education level: Not on file   Tobacco Use   • Smoking status: Never Smoker   • Smokeless tobacco: Never Used   Substance and Sexual Activity   • Alcohol use: No   • Drug use: Yes     Types: Marijuana   • Sexual activity: Defer       Review of Systems   Constitutional: Positive for fatigue. Negative for activity change, appetite change, chills, diaphoresis, fever and unexpected weight change.   HENT: Positive for congestion, drooling, rhinorrhea, sinus pressure and sneezing. Negative for dental problem, ear discharge, ear pain, facial swelling, hearing loss, mouth sores, nosebleeds, postnasal drip, sinus pain, sore throat, tinnitus, trouble swallowing and voice change.    Eyes: Positive for photophobia and visual disturbance. Negative for pain, discharge, redness and itching.   Respiratory: Negative for apnea,  "cough, choking, chest tightness, shortness of breath, wheezing and stridor.    Cardiovascular: Negative for chest pain, palpitations and leg swelling.   Gastrointestinal: Negative for abdominal distention, abdominal pain, anal bleeding, blood in stool, constipation, diarrhea, nausea, rectal pain and vomiting.   Endocrine: Negative for cold intolerance, heat intolerance, polydipsia, polyphagia and polyuria.   Genitourinary: Negative for decreased urine volume, difficulty urinating, dyspareunia, dysuria, enuresis, flank pain, frequency, genital sores, hematuria, menstrual problem, pelvic pain, urgency, vaginal bleeding, vaginal discharge and vaginal pain.   Musculoskeletal: Negative for arthralgias, back pain, gait problem, joint swelling, myalgias, neck pain and neck stiffness.   Skin: Negative for color change, pallor, rash and wound.   Allergic/Immunologic: Negative for environmental allergies, food allergies and immunocompromised state.   Neurological: Positive for dizziness, light-headedness and headaches. Negative for tremors, seizures, syncope, facial asymmetry, speech difficulty, weakness and numbness.   Hematological: Negative for adenopathy. Does not bruise/bleed easily.   Psychiatric/Behavioral: Negative for agitation, behavioral problems, confusion, decreased concentration, dysphoric mood, hallucinations, self-injury, sleep disturbance and suicidal ideas. The patient is not nervous/anxious and is not hyperactive.        Objective   Blood pressure 112/80, temperature 97.1 °F (36.2 °C), temperature source Temporal, height 175.3 cm (69.02\"), weight 85.7 kg (189 lb).  Body mass index is 27.9 kg/m².    Physical Exam   Constitutional: She is oriented to person, place, and time. She appears well-developed and well-nourished. No distress.   HENT:   Head: Normocephalic and atraumatic.   Pulmonary/Chest: Effort normal.   Neurological: She is alert and oriented to person, place, and time. No cranial nerve deficit. "   Skin: Skin is warm and dry. She is not diaphoretic.   Psychiatric: She has a normal mood and affect.         Assessment/Plan     Independent Review of Radiographic Studies:      Available for my review is a MRI of the brain which was obtained on 11/6/2019.  A comparison study from 6/26/2018 is available as well.  The left sided convexity meningioma is essentially unchanged, allowing for slight differences in acquisition and technique.  There is no perilesional edema.  The maximum dimension is approximately 11-12 mm.    Medical Decision Making:      This is a 39-year-old woman with a very small asymptomatic meningioma that has been stable over the course of the last 18 months.  I offered her surgical resection, radiation surgery, or observation.  She elected observation which I do not feel is inappropriate.  The risk of morbidity without intervention at this point is very low.  I reviewed the signs and symptoms of seizure, and intracranial mass-effect, and I directed the patient to contact my office with any new, or worsening symptoms, otherwise I would like to follow-up with her in 1 year.    Sylvia was seen today for headache.    Diagnoses and all orders for this visit:    Meningioma (CMS/HCC)  -     MRI Brain With & Without Contrast; Future        Return in about 1 year (around 11/11/2020).           This document signed by DAPHNEY Wright MD November 11, 2019 9:55 AM

## 2020-07-14 ENCOUNTER — TELEPHONE (OUTPATIENT)
Dept: NEUROSURGERY | Facility: CLINIC | Age: 40
End: 2020-07-14

## 2020-07-14 NOTE — TELEPHONE ENCOUNTER
Provider:  Kyle  Caller:   Time of call:     Phone #:    Surgery:    Surgery Date:    Last visit:   11/11/2019  Next visit: CAM[facility] COLLINS:         Reason for call:       Please refer to previous documentation.

## 2020-07-14 NOTE — TELEPHONE ENCOUNTER
PATIENT READY TO BE SCHEDULED FOR ANNUAL MRI AS CLOSEST TO RETURN DATE AS POSSIBLE BEFORE INSURANCE DISCONTINUES BUT UNABLE TO ASSIST SINCE A PRIOR AUTH IS REQUIRED. ORDER CURRENTLY IN CHART.    PLEASE CONTACT PATIENT -[phone] WITH AN UPDATE

## 2020-07-15 NOTE — TELEPHONE ENCOUNTER
I have left a message with the patient to call me back to explain what exactly needs to be done as the [facility] message doesn't make sense.

## 2020-07-25 ENCOUNTER — HOSPITAL ENCOUNTER (OUTPATIENT)
Dept: MRI IMAGING | Facility: HOSPITAL | Age: 40
Discharge: HOME | End: 2020-07-25
Admitting: NEUROLOGICAL SURGERY

## 2020-07-25 DIAGNOSIS — D32.9 MENINGIOMA (HCC): ICD-10-CM

## 2020-07-25 PROCEDURE — 70553 MRI BRAIN STEM W/O & W/DYE: CPT

## 2020-07-25 PROCEDURE — 25010000002 GADOTERATE MEGLUMINE 7.5 MMOL/15ML SOLUTION: Performed by: NEUROLOGICAL SURGERY

## 2020-07-25 PROCEDURE — A9575 INJ GADOTERATE MEGLUMI 0.1ML: HCPCS | Performed by: NEUROLOGICAL SURGERY

## 2020-07-25 RX ORDER — GADOTERATE MEGLUMINE 376.9 MG/ML
15 INJECTION INTRAVENOUS
Status: COMPLETED | OUTPATIENT
Start: 2020-07-25 | End: 2020-07-25

## 2020-07-25 RX ADMIN — GADOTERATE MEGLUMINE 15 ML: 376.9 INJECTION, SOLUTION INTRAVENOUS at 16:27

## 2020-07-27 ENCOUNTER — OFFICE VISIT (OUTPATIENT)
Dept: NEUROSURGERY | Facility: CLINIC | Age: 40
End: 2020-07-27

## 2020-07-27 VITALS — BODY MASS INDEX: 29.33 KG/M2 | HEIGHT: 69 IN | TEMPERATURE: 98.4 F | WEIGHT: 198 LBS

## 2020-07-27 DIAGNOSIS — D32.0 BENIGN MENINGIOMA OF BRAIN (HCC): Primary | ICD-10-CM

## 2020-07-27 PROCEDURE — 99213 OFFICE O/P EST LOW 20 MIN: CPT | Performed by: NEUROLOGICAL SURGERY

## 2020-07-27 RX ORDER — SUCRALFATE 1 G/1
1 TABLET ORAL 4 TIMES DAILY
COMMUNITY
End: 2022-06-13

## 2020-07-27 RX ORDER — VILAZODONE HYDROCHLORIDE 20 MG/1
20 TABLET ORAL DAILY
COMMUNITY
End: 2022-03-11

## 2020-07-27 NOTE — PROGRESS NOTES
"Subjective     Chief Complaint: Follow-up meningioma    Patient ID: Sylvia Douglass is a 39 y.o. female is here today for follow-up.    History of Present Illness    This is a 39-year-old woman who had an incidentally discovered left parasagittal frontal lobe meningioma.  The meningioma was very small and asymptomatic.  She presents today to discuss the results of her surveillance MRI.  She denies any weakness, seizures, or other focal symptoms that might otherwise be referable to this small meningioma.    The following portions of the patient's history were reviewed and updated as appropriate: allergies, current medications, past family history, past medical history, past social history, past surgical history and problem list.    Family history:   Family History   Problem Relation Age of Onset   • Stroke Mother    • Dementia Maternal Grandmother    • Cancer Maternal Grandfather        Social history:   Social History     Socioeconomic History   • Marital status: Single     Spouse name: Not on file   • Number of children: Not on file   • Years of education: Not on file   • Highest education level: Not on file   Tobacco Use   • Smoking status: Never Smoker   • Smokeless tobacco: Never Used   Substance and Sexual Activity   • Alcohol use: No   • Drug use: Yes     Types: Marijuana   • Sexual activity: Defer       Review of Systems   All other systems reviewed and are negative.      Objective   Temperature 98.4 °F (36.9 °C), temperature source Temporal, height 175.3 cm (69.02\"), weight 89.8 kg (198 lb).  Body mass index is 29.23 kg/m².    Physical Exam   Constitutional: She is oriented to person, place, and time. She appears well-developed and well-nourished. No distress.   HENT:   Head: Normocephalic and atraumatic.   Pulmonary/Chest: Effort normal.   Neurological: She is alert and oriented to person, place, and time. No cranial nerve deficit.   Skin: Skin is warm and dry. She is not diaphoretic. "   Psychiatric: She has a normal mood and affect.         Assessment/Plan     Independent Review of Radiographic Studies:      Available for my review is a MRI of the brain with and without contrast that was performed on 7/25/2020.  A comparison study from November 2019 is also available.  There has been no appreciable change in the 12 mm left parasagittal meningioma.    Medical Decision Making:       Signs and symptoms of an enlarging meningioma in this location were reviewed with the patient.  Follow-up MRI in 2 years, or sooner if clinically indicated    Diagnoses and all orders for this visit:    Benign meningioma of brain (CMS/HCC)        No follow-ups on file.           This document signed by DAPHNEY Wright MD July 27, 2020 12:59

## 2021-05-14 ENCOUNTER — TELEPHONE (OUTPATIENT)
Dept: NEUROSURGERY | Facility: CLINIC | Age: 41
End: 2021-05-14

## 2021-05-14 DIAGNOSIS — R51.9 FREQUENT HEADACHES: ICD-10-CM

## 2021-05-14 DIAGNOSIS — D32.0 BENIGN MENINGIOMA OF BRAIN (HCC): Primary | ICD-10-CM

## 2021-05-14 NOTE — TELEPHONE ENCOUNTER
Provider:  Kyle  Caller: patient  Time of call:   1:34  Phone #:  [phone]  Surgery:  No-Meningioma  Surgery Date:    Last visit:   07/27/20  Next visit:     DENNIS:         Reason for call:    Patient called and said Dr. Wright said she had to follow-up in 2 years.    Patient requests to come in in July of this year due to her migraines.  She said she is also changing insurances and would feel better to have this checked prior to switching.

## 2021-06-09 ENCOUNTER — HOSPITAL ENCOUNTER (OUTPATIENT)
Dept: MRI IMAGING | Facility: HOSPITAL | Age: 41
Discharge: HOME | End: 2021-06-09
Admitting: PHYSICIAN ASSISTANT

## 2021-06-09 DIAGNOSIS — D32.0 BENIGN MENINGIOMA OF BRAIN (HCC): ICD-10-CM

## 2021-06-09 DIAGNOSIS — R51.9 FREQUENT HEADACHES: ICD-10-CM

## 2021-06-09 PROCEDURE — 70553 MRI BRAIN STEM W/O & W/DYE: CPT

## 2021-06-09 PROCEDURE — 0 GADOBENATE DIMEGLUMINE 529 MG/ML SOLUTION: Performed by: PHYSICIAN ASSISTANT

## 2021-06-09 PROCEDURE — A9577 INJ MULTIHANCE: HCPCS | Performed by: PHYSICIAN ASSISTANT

## 2021-06-09 RX ADMIN — GADOBENATE DIMEGLUMINE 18 ML: 529 INJECTION, SOLUTION INTRAVENOUS at 20:19

## 2021-06-16 ENCOUNTER — OFFICE VISIT (OUTPATIENT)
Dept: NEUROSURGERY | Facility: CLINIC | Age: 41
End: 2021-06-16

## 2021-06-16 VITALS
DIASTOLIC BLOOD PRESSURE: 78 MMHG | HEART RATE: 79 BPM | WEIGHT: 196.8 LBS | HEIGHT: 68 IN | TEMPERATURE: 97.8 F | RESPIRATION RATE: 20 BRPM | OXYGEN SATURATION: 99 % | BODY MASS INDEX: 29.83 KG/M2 | SYSTOLIC BLOOD PRESSURE: 118 MMHG

## 2021-06-16 DIAGNOSIS — D32.0 BENIGN MENINGIOMA OF BRAIN (HCC): Primary | ICD-10-CM

## 2021-06-16 PROCEDURE — 99203 OFFICE O/P NEW LOW 30 MIN: CPT | Performed by: NEUROLOGICAL SURGERY

## 2021-06-16 RX ORDER — EPINEPHRINE 0.3 MG/.3ML
INJECTION SUBCUTANEOUS
COMMUNITY
Start: 2021-04-30

## 2021-06-16 RX ORDER — ONDANSETRON 4 MG/1
TABLET, ORALLY DISINTEGRATING ORAL
COMMUNITY
Start: 2021-05-09 | End: 2022-06-13

## 2021-06-16 RX ORDER — LORATADINE 10 MG/1
TABLET ORAL
COMMUNITY
Start: 2021-06-12 | End: 2022-03-11

## 2021-06-16 RX ORDER — HYDROCODONE BITARTRATE AND ACETAMINOPHEN 5; 325 MG/1; MG/1
TABLET ORAL
COMMUNITY
Start: 2021-05-27 | End: 2022-03-11

## 2021-06-16 NOTE — PROGRESS NOTES
Subjective     Chief Complaint: Follow-up meningioma    Patient ID: Sylvia Douglass is a 40 y.o. female is here today for follow-up.    History of Present Illness    This is a 40-year-old woman who has been following for a very small, left convexity meningioma which is asymptomatic.    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  She wanted to get her MRI in the event that it may be another few years before she can have her next surveillance MRI.  Additionally, she is reporting some changes in terms of her headaches.  In the past she has not seen aura with her migraines and she now is having visual disturbances.  Additionally she reports some worsening problems with neck pain and occipital headaches as well.  She denies any numbness, tingling, strokelike symptoms, or seizures.    The following portions of the patient's history were reviewed and updated as appropriate: allergies, current medications, past family history, past medical history, past social history, past surgical history and problem list.    Family history:   Family History   Problem Relation Age of Onset   • Stroke Mother    • Dementia Maternal Grandmother    • Cancer Maternal Grandfather        Social history:   Social History     Socioeconomic History   • Marital status: Single     Spouse name: Not on file   • Number of children: Not on file   • Years of education: Not on file   • Highest education level: Not on file   Tobacco Use   • Smoking status: Never Smoker   • Smokeless tobacco: Never Used   Vaping Use   • Vaping Use: Never used   Substance and Sexual Activity   • Alcohol use: No   • Drug use: Yes     Types: Marijuana   • Sexual activity: Defer       Review of Systems   Constitutional: Negative for activity change, appetite change, chills, diaphoresis, fatigue, fever and unexpected weight change.   HENT:  "Negative for congestion, dental problem, drooling, ear discharge, ear pain, facial swelling, hearing loss, mouth sores, nosebleeds, postnasal drip, rhinorrhea, sinus pressure, sinus pain, sneezing, sore throat, tinnitus, trouble swallowing and voice change.    Eyes: Negative for photophobia, pain, discharge, redness, itching and visual disturbance.   Respiratory: Negative for apnea, cough, choking, chest tightness, shortness of breath, wheezing and stridor.    Cardiovascular: Negative for chest pain, palpitations and leg swelling.   Gastrointestinal: Negative for abdominal distention, abdominal pain, anal bleeding, blood in stool, constipation, diarrhea, nausea, rectal pain and vomiting.   Endocrine: Negative for cold intolerance, heat intolerance, polydipsia, polyphagia and polyuria.   Genitourinary: Negative for decreased urine volume, difficulty urinating, dysuria, enuresis, flank pain, frequency, genital sores, hematuria and urgency.   Musculoskeletal: Negative for arthralgias, back pain, gait problem, joint swelling, myalgias, neck pain and neck stiffness.   Skin: Negative for color change, pallor, rash and wound.   Allergic/Immunologic: Negative for environmental allergies, food allergies and immunocompromised state.   Neurological: Positive for headaches. Negative for dizziness, tremors, seizures, syncope, facial asymmetry, speech difficulty, weakness, light-headedness and numbness.   Hematological: Negative for adenopathy. Does not bruise/bleed easily.   Psychiatric/Behavioral: Negative for agitation, behavioral problems, confusion, decreased concentration, dysphoric mood, hallucinations, self-injury, sleep disturbance and suicidal ideas. The patient is not nervous/anxious and is not hyperactive.        Objective   Blood pressure 118/78, pulse 79, temperature 97.8 °F (36.6 °C), resp. rate 20, height 172.7 cm (68\"), weight 89.3 kg (196 lb 12.8 oz), SpO2 99 %.  Body mass index is 29.92 kg/m².    Physical " Exam  Constitutional:       General: She is not in acute distress.     Appearance: She is well-developed. She is not diaphoretic.   HENT:      Head: Normocephalic and atraumatic.   Pulmonary:      Effort: Pulmonary effort is normal.   Skin:     General: Skin is warm and dry.   Neurological:      Mental Status: She is alert and oriented to person, place, and time.      Cranial Nerves: No cranial nerve deficit.         Assessment/Plan     Independent Review of Radiographic Studies:      Available for my review is a MRI of the brain with and without contrast that was performed on 6/9/2021.  A comparison study from 7/25/2020 is available for my review.  There has been no appreciable change in the small, left posterior frontal lobe parasagittal homogeneously enhancing lesion which is entirely consistent with a small meningioma.  There is no perilesional edema.    Medical Decision Making:      This is a 40-year-old woman with a small convexity left frontal lobe meningioma.  I once again reviewed the signs and symptoms of cortical irritation or mass-effect.  My recommendation would be for a surveillance MRI in 2 years, or sooner if clinically indicated.  I will follow-up with her at that time.    Diagnoses and all orders for this visit:    1. Benign meningioma of brain (CMS/HCC) (Primary)        No follow-ups on file.           This document signed by DAPHNEY Wright MD June 16, 2021 13:43 EDT

## 2022-01-10 ENCOUNTER — TELEPHONE (OUTPATIENT)
Dept: NEUROSURGERY | Facility: CLINIC | Age: 42
End: 2022-01-10

## 2022-01-10 NOTE — TELEPHONE ENCOUNTER
Provider:  Kyle  Caller: patient  Time of call: 8:44    Phone #:  [phone]  Surgery:  na  Surgery Date:  na  Last visit:   6-16-21  Next visit: aaron COLLINS:         Reason for call:   Patient called with new symptoms, her neck and shoulder she is having pain and  with her upper arm being numb and pain in her hand all on the right side. She said that Dr. Wright told her to watch for new symptoms.  Please Advise. Thank you.    When did it start:last week    Where is it located: Neck, shoulder,upper arm, and right hand    How long has this been going on/is this new or the same as before surgery: a month, no surgery    Characteristics of symptom/severity:numbness in the upper area and pain     Timing- Is it constant or intermittent: intermittent    What makes it worse:no    What makes it better:no    What therapies/medications have you tried:heat and ice with Tylneol

## 2022-01-10 NOTE — TELEPHONE ENCOUNTER
I spoke to the patient and relayed the PA's message. Patient was understanding. Should patient follow up with a PA or Dr. Wright?

## 2022-01-10 NOTE — TELEPHONE ENCOUNTER
I called and spoke with patient and she denies unilateral weakness/numbness and visual changes. Patient denies symptoms of stroke and seizure.  (Patient states it has been a while since she has had visual problems.)  She feels that her speech is getting noticeably worse. She said this has been going on for about 1.5 years when her migraines began to change.    She wants to know if she should have a MRI prior to her apt.

## 2022-01-10 NOTE — TELEPHONE ENCOUNTER
She should be seen in office first for physical exam to confirm which tests to order. Also, dont think insurance would approve a scan without an evaluation.

## 2022-01-10 NOTE — TELEPHONE ENCOUNTER
Sounds like cervical radiculopathy. Does not sound related to small meningioma.   She can see a PA for workup/eval        Please review s/sx to look out for related to meningioma: Unilateral weakness/numbness, visual changes, speech difficulties, s/sx of stroke of seizure

## 2022-01-19 ENCOUNTER — OFFICE VISIT (OUTPATIENT)
Dept: NEUROSURGERY | Facility: CLINIC | Age: 42
End: 2022-01-19

## 2022-01-19 VITALS
HEIGHT: 68 IN | BODY MASS INDEX: 26.58 KG/M2 | DIASTOLIC BLOOD PRESSURE: 62 MMHG | TEMPERATURE: 97.1 F | SYSTOLIC BLOOD PRESSURE: 102 MMHG | WEIGHT: 175.4 LBS

## 2022-01-19 DIAGNOSIS — M54.12 CERVICAL RADICULOPATHY: Primary | ICD-10-CM

## 2022-01-19 PROCEDURE — 99213 OFFICE O/P EST LOW 20 MIN: CPT | Performed by: PHYSICIAN ASSISTANT

## 2022-01-19 RX ORDER — LORATADINE 10 MG/1
CAPSULE, LIQUID FILLED ORAL
COMMUNITY

## 2022-01-19 RX ORDER — ONDANSETRON HYDROCHLORIDE 8 MG/1
TABLET, FILM COATED ORAL
COMMUNITY
Start: 2022-01-04

## 2022-01-19 RX ORDER — ALUMINUM CHLORIDE 20 %
SOLUTION, NON-ORAL TOPICAL
COMMUNITY

## 2022-01-19 RX ORDER — HYDROXYZINE HYDROCHLORIDE 25 MG/1
TABLET, FILM COATED ORAL
COMMUNITY
Start: 2022-01-04

## 2022-01-19 RX ORDER — CYCLOBENZAPRINE HCL 10 MG
TABLET ORAL EVERY 8 HOURS SCHEDULED
COMMUNITY
Start: 2022-01-11 | End: 2022-03-11

## 2022-01-19 RX ORDER — RIZATRIPTAN BENZOATE 10 MG/1
TABLET ORAL
COMMUNITY
Start: 2021-12-20

## 2022-01-19 NOTE — PROGRESS NOTES
"Subjective     Chief Complaint: ***    Patient ID: Sylvia Douglass is a 41 y.o. female {Specialty - why patient here?:74995}    Neck Pain   Associated symptoms include headaches and numbness.       ***    The following portions of the patient's history were reviewed and updated as appropriate: allergies, current medications, past family history, past medical history, past social history, past surgical history and problem list.    Family history:   Family History   Problem Relation Age of Onset   • Stroke Mother    • Dementia Maternal Grandmother    • Cancer Maternal Grandfather        Social history:   Social History     Socioeconomic History   • Marital status: Single   Tobacco Use   • Smoking status: Never Smoker   • Smokeless tobacco: Never Used   Vaping Use   • Vaping Use: Never used   Substance and Sexual Activity   • Alcohol use: No   • Drug use: Yes     Types: Marijuana   • Sexual activity: Defer       Review of Systems   Constitutional: Negative.    Eyes: Negative.    Respiratory: Negative.    Cardiovascular: Negative.    Gastrointestinal: Negative.    Musculoskeletal: Positive for neck pain.   Neurological: Positive for numbness and headaches.   Hematological: Negative.    Psychiatric/Behavioral: Negative.        Objective   Blood pressure 102/62, temperature 97.1 °F (36.2 °C), temperature source Infrared, height 172.7 cm (68\"), weight 79.6 kg (175 lb 6.4 oz).  Body mass index is 26.67 kg/m².  Patient's Body mass index is 26.67 kg/m². indicating that she is {weight categories:32577}.      Physical Exam      Assessment/Plan       ***    Diagnoses and all orders for this visit:    1. Cervical radiculopathy (Primary)  -     MRI Cervical Spine Without Contrast; Future  -     Ambulatory Referral to Physical Therapy        Return in about 4 weeks (around 2/16/2022), or if symptoms worsen or fail to improve.             Pura Morrell MA        "

## 2022-01-19 NOTE — PROGRESS NOTES
Subjective     Chief Complaint: ***    Patient ID: Sylvia Douglass is a 41 y.o. female {Specialty - why patient here?:68687}    History of Present Illness    ***    The following portions of the patient's history were reviewed and updated as appropriate: allergies, current medications, past family history, past medical history, past social history, past surgical history and problem list.    Family history:   Family History   Problem Relation Age of Onset   • Stroke Mother    • Dementia Maternal Grandmother    • Cancer Maternal Grandfather        Social history:   Social History     Socioeconomic History   • Marital status: Single   Tobacco Use   • Smoking status: Never Smoker   • Smokeless tobacco: Never Used   Vaping Use   • Vaping Use: Never used   Substance and Sexual Activity   • Alcohol use: No   • Drug use: Yes     Types: Marijuana   • Sexual activity: Defer       Review of Systems   Constitutional: Negative for activity change, appetite change, chills, diaphoresis, fatigue, fever and unexpected weight change.   HENT: Negative for congestion, dental problem, drooling, ear discharge, ear pain, facial swelling, hearing loss, mouth sores, nosebleeds, postnasal drip, rhinorrhea, sinus pressure, sinus pain, sneezing, sore throat, tinnitus, trouble swallowing and voice change.    Eyes: Negative for photophobia, pain, discharge, redness, itching and visual disturbance.   Respiratory: Negative for apnea, cough, choking, chest tightness, shortness of breath, wheezing and stridor.    Cardiovascular: Negative for chest pain, palpitations and leg swelling.   Gastrointestinal: Negative for abdominal distention, abdominal pain, anal bleeding, blood in stool, constipation, diarrhea, nausea, rectal pain and vomiting.   Endocrine: Negative for cold intolerance, heat intolerance, polydipsia, polyphagia and polyuria.   Genitourinary: Negative for decreased urine volume, difficulty urinating, dysuria, enuresis, flank  "pain, frequency, genital sores, hematuria and urgency.   Musculoskeletal: Negative for arthralgias, back pain, gait problem, joint swelling, myalgias, neck pain and neck stiffness.   Skin: Negative for color change, pallor, rash and wound.   Allergic/Immunologic: Negative for environmental allergies, food allergies and immunocompromised state.   Neurological: Positive for headaches. Negative for dizziness, tremors, seizures, syncope, facial asymmetry, speech difficulty, weakness, light-headedness and numbness.   Hematological: Negative for adenopathy. Does not bruise/bleed easily.   Psychiatric/Behavioral: Negative for agitation, behavioral problems, confusion, decreased concentration, dysphoric mood, hallucinations, self-injury, sleep disturbance and suicidal ideas. The patient is not nervous/anxious and is not hyperactive.        Objective   Blood pressure 102/62, temperature 97.1 °F (36.2 °C), temperature source Infrared, height 172.7 cm (68\"), weight 79.6 kg (175 lb 6.4 oz).  Body mass index is 26.67 kg/m².  Patient's Body mass index is 26.67 kg/m². indicating that she is {weight categories:70774}.      Physical Exam      Assessment/Plan       ***    There are no diagnoses linked to this encounter.    No follow-ups on file.             Pura Morrell MA        "

## 2022-01-19 NOTE — PROGRESS NOTES
Subjective     Chief Complaint: Neck pain, right arm pain and numbness    Patient ID: Sylvia Douglass is a 41 y.o. female seen for consultation today at the request of  No ref. provider found    Neck Pain   Associated symptoms include headaches and numbness.       This is a 41-year-old woman who we have followed in our office for a small left frontal lobe meningioma.  She is seen today with complaints of neck pain which are chronic and more recently she has began experiencing radiating pain into her right shoulder and arm.  Symptoms began 1 month ago, denies antecedent injury.  She has numbness/tingling in this distribution.  She has intermittent numbness in her right hand secondary to a surgery she had in her hand 12 years ago.  She denies left-sided symptoms, weakness, gait instability, or bowel or bladder dysfunction.  She follows with interventional pain management for her chronic neck pain and is scheduled for an ablation tomorrow.    The following portions of the patient's history were reviewed and updated as appropriate: allergies, current medications, past family history, past medical history, past social history, past surgical history and problem list.    Family history:   Family History   Problem Relation Age of Onset   • Stroke Mother    • Dementia Maternal Grandmother    • Cancer Maternal Grandfather        Social history:   Social History     Socioeconomic History   • Marital status: Single   Tobacco Use   • Smoking status: Never Smoker   • Smokeless tobacco: Never Used   Vaping Use   • Vaping Use: Never used   Substance and Sexual Activity   • Alcohol use: No   • Drug use: Yes     Types: Marijuana   • Sexual activity: Defer       Review of Systems   Constitutional: Negative.    Eyes: Negative.    Respiratory: Negative.    Cardiovascular: Negative.    Gastrointestinal: Negative.    Musculoskeletal: Positive for neck pain.   Neurological: Positive for numbness and headaches.   Hematological:  "Negative.    Psychiatric/Behavioral: Negative.        Objective   Blood pressure 102/62, temperature 97.1 °F (36.2 °C), temperature source Infrared, height 172.7 cm (68\"), weight 79.6 kg (175 lb 6.4 oz).  Body mass index is 26.67 kg/m².  Patient's Body mass index is 26.67 kg/m². indicating that she is overweight (BMI 25-29.9). Obesity-related health conditions include the following: osteoarthritis. Obesity is unchanged. BMI is is above average; BMI management plan is completed. We discussed portion control and increasing exercise..      Physical Exam  Constitutional:       General: She is not in acute distress.     Appearance: Normal appearance. She is not ill-appearing, toxic-appearing or diaphoretic.   HENT:      Head: Normocephalic and atraumatic.   Eyes:      Conjunctiva/sclera: Conjunctivae normal.   Pulmonary:      Effort: Pulmonary effort is normal.   Musculoskeletal:      Cervical back: Normal range of motion and neck supple.   Skin:     General: Skin is warm and dry.   Neurological:      Mental Status: She is alert.      GCS: GCS eye subscore is 4. GCS verbal subscore is 5. GCS motor subscore is 6.      Sensory: Sensation is intact.      Motor: Motor function is intact.      Gait: Gait is intact. Tandem walk normal.      Deep Tendon Reflexes:      Reflex Scores:       Tricep reflexes are 2+ on the right side and 2+ on the left side.       Bicep reflexes are 2+ on the right side and 2+ on the left side.       Brachioradialis reflexes are 2+ on the right side and 2+ on the left side.       Patellar reflexes are 2+ on the right side and 2+ on the left side.       Achilles reflexes are 2+ on the right side and 2+ on the left side.     Comments: Casual gait is normal.  Performs tandem, heel and toe gait without difficulty.  Negative Romberg sign.  Ludwin sign and clonus are absent.   Psychiatric:         Mood and Affect: Mood normal.         Behavior: Behavior normal.           Assessment/Plan       This is a " 41-year-old woman with chronic neck pain who is seen with a 1 month history of right-sided radiculopathy.  She has not tried much in the way of conservative treatment.  I have referred her to physical therapy and ordered an MRI of the cervical spine.  She has been unable to tolerate gabapentin in the past.  I will follow-up with her after her MRI.  Signs/symptoms of cervical myelopathy reviewed with her.    Diagnoses and all orders for this visit:    1. Cervical radiculopathy (Primary)  -     MRI Cervical Spine Without Contrast; Future  -     Ambulatory Referral to Physical Therapy        Return in about 4 weeks (around 2/16/2022), or if symptoms worsen or fail to improve.             Tigist Ignacio PA-C

## 2022-01-19 NOTE — PROGRESS NOTES
Subjective     Chief Complaint: Neck pain, right arm pain and numbness    Patient ID: Sylvia Douglass is a 41 y.o. female seen for consultation today at the request of  No ref. provider found    History of Present Illness    This is a 41-year-old woman who we have followed in our office for a small left frontal lobe meningioma.  She is seen today with complaints of neck pain which are chronic and more recently she has began experiencing radiating pain into her right shoulder and arm.  Symptoms began 1 month ago, denies antecedent injury.  She has numbness/tingling in this distribution.  She has intermittent numbness in her right hand secondary to a surgery she had in her hand 12 years ago.  She denies left-sided symptoms, weakness, gait instability, or bowel or bladder dysfunction.    The following portions of the patient's history were reviewed and updated as appropriate: allergies, current medications, past family history, past medical history, past social history, past surgical history and problem list.    Family history:   Family History   Problem Relation Age of Onset   • Stroke Mother    • Dementia Maternal Grandmother    • Cancer Maternal Grandfather        Social history:   Social History     Socioeconomic History   • Marital status: Single   Tobacco Use   • Smoking status: Never Smoker   • Smokeless tobacco: Never Used   Vaping Use   • Vaping Use: Never used   Substance and Sexual Activity   • Alcohol use: No   • Drug use: Yes     Types: Marijuana   • Sexual activity: Defer       Review of Systems   Constitutional: Negative.    Eyes: Negative.    Respiratory: Negative.    Cardiovascular: Negative.    Gastrointestinal: Negative.    Musculoskeletal: Positive for neck pain.   Neurological: Positive for numbness and headaches.   Hematological: Negative.    Psychiatric/Behavioral: Negative.        Objective   Blood pressure 102/62, temperature 97.1 °F (36.2 °C), temperature source Infrared, height  "172.7 cm (68\"), weight 79.6 kg (175 lb 6.4 oz).  Body mass index is 26.67 kg/m².  Patient's Body mass index is 26.67 kg/m². indicating that she is overweight (BMI 25-29.9). Obesity-related health conditions include the following: osteoarthritis. Obesity is unchanged. BMI is is above average; BMI management plan is completed. We discussed portion control and increasing exercise..      Physical Exam      Assessment/Plan       This is a 41-year-old woman with chronic neck pain who is seen with a 1 month history of right-sided radiculopathy.    There are no diagnoses linked to this encounter.    No follow-ups on file.             Farrah Gaffney LPN        "

## 2022-02-02 ENCOUNTER — HOSPITAL ENCOUNTER (OUTPATIENT)
Dept: MRI IMAGING | Facility: HOSPITAL | Age: 42
Discharge: HOME | End: 2022-02-02
Admitting: PHYSICIAN ASSISTANT

## 2022-02-02 DIAGNOSIS — M54.12 CERVICAL RADICULOPATHY: ICD-10-CM

## 2022-02-02 PROCEDURE — 72141 MRI NECK SPINE W/O DYE: CPT

## 2022-02-09 ENCOUNTER — APPOINTMENT (OUTPATIENT)
Dept: MRI IMAGING | Facility: HOSPITAL | Age: 42
End: 2022-02-09

## 2022-03-11 ENCOUNTER — OFFICE VISIT (OUTPATIENT)
Dept: NEUROSURGERY | Facility: CLINIC | Age: 42
End: 2022-03-11

## 2022-03-11 VITALS
SYSTOLIC BLOOD PRESSURE: 104 MMHG | WEIGHT: 173 LBS | DIASTOLIC BLOOD PRESSURE: 62 MMHG | HEIGHT: 68 IN | BODY MASS INDEX: 26.22 KG/M2 | TEMPERATURE: 97.5 F

## 2022-03-11 DIAGNOSIS — M50.30 DDD (DEGENERATIVE DISC DISEASE), CERVICAL: Primary | ICD-10-CM

## 2022-03-11 DIAGNOSIS — M54.50 CHRONIC BILATERAL LOW BACK PAIN WITHOUT SCIATICA: ICD-10-CM

## 2022-03-11 DIAGNOSIS — G89.29 CHRONIC BILATERAL LOW BACK PAIN WITHOUT SCIATICA: ICD-10-CM

## 2022-03-11 PROCEDURE — 99214 OFFICE O/P EST MOD 30 MIN: CPT | Performed by: PHYSICIAN ASSISTANT

## 2022-03-11 RX ORDER — HYDROCODONE BITARTRATE AND ACETAMINOPHEN 7.5; 325 MG/1; MG/1
TABLET ORAL
COMMUNITY
Start: 2022-02-17

## 2022-03-11 RX ORDER — ALPRAZOLAM 0.5 MG/1
0.5 TABLET ORAL
COMMUNITY
Start: 2022-01-19

## 2022-03-11 RX ORDER — BIMATOPROST 3 UG/ML
SOLUTION TOPICAL
COMMUNITY
Start: 2022-01-28

## 2022-03-11 NOTE — PROGRESS NOTES
Subjective     Chief Complaint: Neck pain    Patient ID: Sylvia Douglass is a 41 y.o. female is here today for follow-up.    History of Present Illness    This is a 41-year-old woman who is followed in our office for a small left frontal lobe meningioma.  More recently, she presented with neck pain and right arm pain.  She was referred for physical therapy and presents today in follow-up with a cervical MRI.  She reports significant improvement in her right arm pain.  She still has some neck pain and right arm numbness but this is improved.  Denies weakness or balance difficulties.  She underwent an RFA with pain management which was very helpful.  She is also complaining of some intermittent low back pain.  Denies radiating pain, numbness, weakness, saddle anesthesia or bowel bladder dysfunction.    The following portions of the patient's history were reviewed and updated as appropriate: allergies, current medications, past family history, past medical history, past social history, past surgical history and problem list.    Family history:   Family History   Problem Relation Age of Onset   • Stroke Mother    • Anxiety disorder Mother    • Dementia Maternal Grandmother    • Stroke Maternal Grandmother    • Cancer Maternal Grandfather    • Heart disease Paternal Grandfather    • Hyperlipidemia Paternal Grandfather    • Heart disease Paternal Grandmother    • Hyperlipidemia Paternal Grandmother    • Vision loss Paternal Grandmother         Macular Degeneration       Social history:   Social History     Socioeconomic History   • Marital status: Single   Tobacco Use   • Smoking status: Never Smoker   • Smokeless tobacco: Never Used   Vaping Use   • Vaping Use: Never used   Substance and Sexual Activity   • Alcohol use: No   • Drug use: Yes     Types: Marijuana   • Sexual activity: Yes     Partners: Male     Birth control/protection: None       Review of Systems   Constitutional: Positive for fatigue. Negative  for activity change, appetite change, chills, diaphoresis, fever and unexpected weight change.   HENT: Positive for congestion. Negative for dental problem, drooling, ear discharge, ear pain, facial swelling, hearing loss, mouth sores, nosebleeds, postnasal drip, rhinorrhea, sinus pressure, sneezing, sore throat, tinnitus, trouble swallowing and voice change.    Eyes: Negative for photophobia, pain, discharge, redness, itching and visual disturbance.   Respiratory: Negative for apnea, cough, choking, chest tightness, shortness of breath, wheezing and stridor.    Cardiovascular: Negative for chest pain, palpitations and leg swelling.   Gastrointestinal: Negative for abdominal distention, abdominal pain, anal bleeding, blood in stool, constipation, diarrhea, nausea, rectal pain and vomiting.   Endocrine: Negative for cold intolerance, heat intolerance, polydipsia, polyphagia and polyuria.   Genitourinary: Negative for decreased urine volume, difficulty urinating, dysuria, enuresis, flank pain, frequency, genital sores, hematuria and urgency.   Musculoskeletal: Positive for back pain, myalgias, neck pain and neck stiffness. Negative for arthralgias, gait problem and joint swelling.   Skin: Negative for color change, pallor, rash and wound.   Allergic/Immunologic: Positive for environmental allergies. Negative for food allergies and immunocompromised state.   Neurological: Positive for dizziness, speech difficulty, numbness and headaches. Negative for tremors, seizures, syncope, facial asymmetry, weakness and light-headedness.   Hematological: Negative for adenopathy. Does not bruise/bleed easily.   Psychiatric/Behavioral: Positive for dysphoric mood and sleep disturbance. Negative for agitation, behavioral problems, confusion, decreased concentration, hallucinations, self-injury and suicidal ideas. The patient is nervous/anxious. The patient is not hyperactive.    All other systems reviewed and are  "negative.      Objective   Blood pressure 104/62, temperature 97.5 °F (36.4 °C), temperature source Infrared, height 172.7 cm (68\"), weight 78.5 kg (173 lb).  Body mass index is 26.3 kg/m².  Patient's Body mass index is 26.3 kg/m². indicating that she is overweight (BMI 25-29.9). Patient's (Body mass index is 26.3 kg/m².) indicates that they are overweight with health conditions that include osteoarthritis . Weight is unchanged. BMI is is above average; BMI management plan is completed. We discussed portion control and increasing exercise. .      Physical Exam  Constitutional:       Appearance: Normal appearance.   HENT:      Head: Normocephalic and atraumatic.   Eyes:      Conjunctiva/sclera: Conjunctivae normal.   Pulmonary:      Effort: Pulmonary effort is normal.   Musculoskeletal:      Cervical back: Normal range of motion and neck supple. Muscular tenderness present. No spinous process tenderness.   Skin:     General: Skin is warm and dry.   Neurological:      Mental Status: She is alert and oriented to person, place, and time. Mental status is at baseline.      GCS: GCS eye subscore is 4. GCS verbal subscore is 5. GCS motor subscore is 6.      Gait: Gait normal.   Psychiatric:         Mood and Affect: Mood normal.         Behavior: Behavior normal.         Independent review of diagnostic studies  MRI of the cervical spine without contrast performed on 2/2/2022 demonstrates multilevel degenerative changes.  There is a subacute disc herniation at C5-6 which results in moderate narrowing of the right neuroforamen.  Cord signal is normal throughout.  Imaging also reviewed with Dr. Wright.    Assessment/Plan       This is a 41-year-old woman with a resolving right C6 radiculopathy.  She will continue with physical therapy and interventional pain management for her ongoing chronic neck pain and low back pain.  I reviewed the signs and symptoms of cervical radiculopathy and myelopathy, lumbar radiculopathy and " cauda equina syndrome with her.  We would be happy to follow with her on an as-needed basis moving forward.    Diagnoses and all orders for this visit:    1. DDD (degenerative disc disease), cervical (Primary)    2. Chronic bilateral low back pain without sciatica  -     Ambulatory Referral to Physical Therapy Evaluate and treat        Return if symptoms worsen or fail to improve.             Tigist Ignacio PA-C

## 2022-05-10 ENCOUNTER — TELEPHONE (OUTPATIENT)
Dept: NEUROSURGERY | Facility: CLINIC | Age: 42
End: 2022-05-10

## 2022-05-10 DIAGNOSIS — R29.898 HAND WEAKNESS: Primary | ICD-10-CM

## 2022-05-10 NOTE — TELEPHONE ENCOUNTER
Scheduled EMG/NCV on 6/29/22 @ 9:30 (earliest they could get her at ). F/U appt is on 6/29/22 w/ Ivelisse Bond PA-C. Called and spoke w/ Pt and gave appintment details and is ok with it. She said she would try to call her Hand Dr at  if they could do the EMG/NCV on her sooner and will let us know. I've put her on wait list as well.

## 2022-05-10 NOTE — TELEPHONE ENCOUNTER
Provider: Mateus    Caller: Sylvia Douglass  Time of call:  12:46 PM    Phone #:  [phone]  Surgery:  N/A  Surgery Date: N/A   Last visit:   Office Visit with Tigist Ignacio PA-C (03/11/2022)    Next visit:       Reason for call:   Pt LVM stating that she has been dropping things lately and her PT wanted her to call us and let us know. Pt stated that a return call was not necessary.  Does pt need to do anything in regards to this issue?

## 2022-05-10 NOTE — TELEPHONE ENCOUNTER
I dont see anything on her cervical Mri that would be causing weakness and she did not c/o weakness at last OV. We can check an EMG/NCV to r/o CTS.

## 2022-05-16 ENCOUNTER — TELEPHONE (OUTPATIENT)
Dept: NEUROSURGERY | Facility: CLINIC | Age: 42
End: 2022-05-16

## 2022-05-16 RX ORDER — METHYLPREDNISOLONE 4 MG/1
TABLET ORAL
Qty: 21 TABLET | Refills: 0 | Status: SHIPPED | OUTPATIENT
Start: 2022-05-16 | End: 2022-06-13
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
## 2022-05-16 NOTE — TELEPHONE ENCOUNTER
Called and notified patient. Will call her Friday for any update.     She states the the MDP causes severe anxiety. Wants to take something else.

## 2022-05-16 NOTE — TELEPHONE ENCOUNTER
I spoke with patient she will not be taking the NSAID's because she has stomach Ulcer's. She also stated she will not be taking the Steroid pack because it causes anxiety issues. Patient wanted to know if we was still going to call and check on her and order scans Friday. I explained to the patient with her not taking any of the medication/ accepting anything being offered they may not call Friday because there shouldn't be much/ if any improvement due to her not wanting to take any of the medication to possibly help her. The patient then stated she gets her pain medication on Wednesday but she will try to Steroid and see how long she can tolerate it. She will also let us know if she is able to complete the medication so that if she does not get to feeling better she will be able to still get the scans and everything that was offered completed.

## 2022-05-18 ENCOUNTER — TELEPHONE (OUTPATIENT)
Dept: NEUROSURGERY | Facility: CLINIC | Age: 42
End: 2022-05-18

## 2022-05-18 NOTE — TELEPHONE ENCOUNTER
Provider:  Mateus  Surgery:  aaron  Surgery Date:  na  Last visit:  3-11-22   Next visit: 6-29-22    DENNIS:         Reason for call:   Patient called and said that she wanted us to know that she is on day 3 of the Medrol Dose Juan and she is having rapid heart rate, anxiety, and  Anxious . She said that it hasn't started to help much yet, but will keep taking it and call us back at the end of the week to give us another update.

## 2022-05-20 ENCOUNTER — TELEPHONE (OUTPATIENT)
Dept: NEUROSURGERY | Facility: CLINIC | Age: 42
End: 2022-05-20

## 2022-05-20 NOTE — TELEPHONE ENCOUNTER
"WEEK FU CALL FROM PATIENT    \"Caller: Sylvia Douglass     Relationship: Self     Best call back number: [phone]     What was the call regarding: UPDATE:  Reason for call:   Patient called and said that she wanted us to know that she is on DAY 5 of the Medrol Dose Juan.       PT STATES SHE IN NOT FEELING ANY BETTER AT ALL.     STILL HAS MIGRAINES.      PAIN IS SHOOTING DOWN ARM, LOWER IN NECK AND UPPER BACK.     NUMBNESS DOWN BOTH UPPER EXTREMITIES INTO FINGERS.  10/10, HAS NOT EASED UP.      PAIN ON RIGHT SIDE THAT SHOCKS IF HEAD IS TURNED WRONG.     PAIN IS GETTING TOO MUCH.     \"  "
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
## 2022-05-20 NOTE — TELEPHONE ENCOUNTER
Shanelle, can you please see if there is an available appointment to get this patient in sooner? She is scheduled to see MARLA Bond on 6/29/22. She is a Dr. Wright patient.

## 2022-05-20 NOTE — TELEPHONE ENCOUNTER
Caller: Sylvia Douglass    Relationship: Self    Best call back number: [phone]    What was the call regarding: UPDATE:  Reason for call:   Patient called and said that she wanted us to know that she is on DAY 5 of the Medrol Dose Juan.      PT STATES SHE IN NOT FEELING ANY BETTER AT ALL.    STILL HAS MIGRAINES.     PAIN IS SHOOTING DOWN ARM, LOWER IN NECK AND UPPER BACK.    NUMBNESS DOWN BOTH UPPER EXTREMITIES INTO FINGERS.  10/10, HAS NOT EASED UP.     PAIN ON RIGHT SIDE THAT SHOCKS IF HEAD IS TURNED WRONG.    PAIN IS GETTING TOO MUCH.    Do you require a callback: YES

## 2022-05-23 ENCOUNTER — TELEPHONE (OUTPATIENT)
Dept: NEUROSURGERY | Facility: CLINIC | Age: 42
End: 2022-05-23
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
## 2022-05-23 NOTE — TELEPHONE ENCOUNTER
Is it okay for Patient to follow up without EMG/ NCV since patient is being seen for something new?

## 2022-05-25 ENCOUNTER — TELEPHONE (OUTPATIENT)
Dept: NEUROSURGERY | Facility: CLINIC | Age: 42
End: 2022-05-25

## 2022-05-25 NOTE — TELEPHONE ENCOUNTER
Provider: Tigist Ignacio   Surgery:  n/a  Surgery Date: n/a   Last visit: Office Visit with Tigist Ignacio PA-C (03/11/2022)      Next visit: Appointment with Rosie Bond PA-C (06/29/2022)          Reason for call:  Pt LVM stating that she was calling with an update after going to the ER for her pain. Pt wanting to know if she could be seen sooner. EMG and appt with Ivelisse Bond scheduled for 6/29.        LVM letting pt know that we can attempt to get her EMG and F/U appointment moved up but in the mean time there is not much else we would be able to offer her.   S/W Marge to see if she can get sooner EMG appointment.

## 2022-05-25 NOTE — TELEPHONE ENCOUNTER
Called scheduling and s/w Mayi osei cancellations for the EMG at this time.  Relayed info to Farrah MENDEZ
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
## 2022-05-27 ENCOUNTER — APPOINTMENT (OUTPATIENT)
Dept: CT IMAGING | Facility: HOSPITAL | Age: 42
End: 2022-05-27

## 2022-05-27 ENCOUNTER — HOSPITAL ENCOUNTER (EMERGENCY)
Facility: HOSPITAL | Age: 42
Discharge: HOME | End: 2022-05-28
Attending: STUDENT IN AN ORGANIZED HEALTH CARE EDUCATION/TRAINING PROGRAM

## 2022-05-27 ENCOUNTER — APPOINTMENT (OUTPATIENT)
Dept: MRI IMAGING | Facility: HOSPITAL | Age: 42
End: 2022-05-27

## 2022-05-27 DIAGNOSIS — D32.9 MENINGIOMA: ICD-10-CM

## 2022-05-27 DIAGNOSIS — G43.509 PERSISTENT MIGRAINE AURA WITHOUT CEREBRAL INFARCTION AND WITHOUT STATUS MIGRAINOSUS, NOT INTRACTABLE: Primary | ICD-10-CM

## 2022-05-27 DIAGNOSIS — E83.51 HYPOCALCEMIA: ICD-10-CM

## 2022-05-27 DIAGNOSIS — M50.20 PROTRUSION OF CERVICAL INTERVERTEBRAL DISC: ICD-10-CM

## 2022-05-27 LAB
ALBUMIN SERPL-MCNC: 3.8 G/DL (ref 3.5–5.2)
ALBUMIN/GLOB SERPL: 1.7 G/DL
ALP SERPL-CCNC: 91 U/L (ref 39–117)
ALT SERPL W P-5'-P-CCNC: 15 U/L (ref 1–33)
ANION GAP SERPL CALCULATED.3IONS-SCNC: 14 MMOL/L (ref 5–15)
AST SERPL-CCNC: 22 U/L (ref 1–32)
BASOPHILS # BLD AUTO: 0.02 10*3/MM3 (ref 0–0.2)
BASOPHILS NFR BLD AUTO: 0.3 % (ref 0–1.5)
BILIRUB SERPL-MCNC: 0.3 MG/DL (ref 0–1.2)
BILIRUB UR QL STRIP: NEGATIVE
BUN BLDA-MCNC: 4 MG/DL (ref 8–26)
BUN SERPL-MCNC: 5 MG/DL (ref 6–20)
BUN/CREAT SERPL: 10.6 (ref 7–25)
CA-I BLDA-SCNC: 1.22 MMOL/L (ref 1.2–1.32)
CALCIUM SPEC-SCNC: 8.2 MG/DL (ref 8.6–10.5)
CHLORIDE BLDA-SCNC: 103 MMOL/L (ref 98–109)
CHLORIDE SERPL-SCNC: 103 MMOL/L (ref 98–107)
CLARITY UR: CLEAR
CO2 BLDA-SCNC: 24 MMOL/L (ref 24–29)
CO2 SERPL-SCNC: 21 MMOL/L (ref 22–29)
COLOR UR: YELLOW
CREAT BLDA-MCNC: 0.5 MG/DL (ref 0.6–1.3)
CREAT SERPL-MCNC: 0.47 MG/DL (ref 0.57–1)
DEPRECATED RDW RBC AUTO: 50.9 FL (ref 37–54)
EGFRCR SERPLBLD CKD-EPI 2021: 121 ML/MIN/1.73
EGFRCR SERPLBLD CKD-EPI 2021: 122.8 ML/MIN/1.73
EOSINOPHIL # BLD AUTO: 0.09 10*3/MM3 (ref 0–0.4)
EOSINOPHIL NFR BLD AUTO: 1.3 % (ref 0.3–6.2)
ERYTHROCYTE [DISTWIDTH] IN BLOOD BY AUTOMATED COUNT: 15.5 % (ref 12.3–15.4)
GLOBULIN UR ELPH-MCNC: 2.3 GM/DL
GLUCOSE BLDC GLUCOMTR-MCNC: 109 MG/DL (ref 70–130)
GLUCOSE SERPL-MCNC: 100 MG/DL (ref 65–99)
GLUCOSE UR STRIP-MCNC: NEGATIVE MG/DL
HCT VFR BLD AUTO: 36.7 % (ref 34–46.6)
HCT VFR BLDA CALC: 41 % (ref 38–51)
HGB BLD-MCNC: 12 G/DL (ref 12–15.9)
HGB BLDA-MCNC: 13.9 G/DL (ref 12–17)
HGB UR QL STRIP.AUTO: NEGATIVE
IMM GRANULOCYTES # BLD AUTO: 0.03 10*3/MM3 (ref 0–0.05)
IMM GRANULOCYTES NFR BLD AUTO: 0.4 % (ref 0–0.5)
INR PPP: 1 (ref 0.8–1.2)
KETONES UR QL STRIP: ABNORMAL
LEUKOCYTE ESTERASE UR QL STRIP.AUTO: NEGATIVE
LYMPHOCYTES # BLD AUTO: 2.04 10*3/MM3 (ref 0.7–3.1)
LYMPHOCYTES NFR BLD AUTO: 30 % (ref 19.6–45.3)
MAGNESIUM SERPL-MCNC: 1.7 MG/DL (ref 1.6–2.6)
MCH RBC QN AUTO: 30 PG (ref 26.6–33)
MCHC RBC AUTO-ENTMCNC: 32.7 G/DL (ref 31.5–35.7)
MCV RBC AUTO: 91.8 FL (ref 79–97)
MONOCYTES # BLD AUTO: 0.39 10*3/MM3 (ref 0.1–0.9)
MONOCYTES NFR BLD AUTO: 5.7 % (ref 5–12)
NEUTROPHILS NFR BLD AUTO: 4.23 10*3/MM3 (ref 1.7–7)
NEUTROPHILS NFR BLD AUTO: 62.3 % (ref 42.7–76)
NITRITE UR QL STRIP: NEGATIVE
NRBC BLD AUTO-RTO: 0 /100 WBC (ref 0–0.2)
PH UR STRIP.AUTO: 6.5 [PH] (ref 5–8)
PLATELET # BLD AUTO: 242 10*3/MM3 (ref 140–450)
PMV BLD AUTO: 10.9 FL (ref 6–12)
POTASSIUM BLDA-SCNC: 3.4 MMOL/L (ref 3.5–4.9)
POTASSIUM SERPL-SCNC: 4.5 MMOL/L (ref 3.5–5.2)
PROT SERPL-MCNC: 6.1 G/DL (ref 6–8.5)
PROT UR QL STRIP: NEGATIVE
PROTHROMBIN TIME: 12.4 SECONDS (ref 12.8–15.2)
RBC # BLD AUTO: 4 10*6/MM3 (ref 3.77–5.28)
SODIUM BLD-SCNC: 140 MMOL/L (ref 138–146)
SODIUM SERPL-SCNC: 138 MMOL/L (ref 136–145)
SP GR UR STRIP: 1.05 (ref 1–1.03)
T4 FREE SERPL-MCNC: 1.61 NG/DL (ref 0.93–1.7)
TROPONIN T SERPL-MCNC: <0.01 NG/ML (ref 0–0.03)
TSH SERPL DL<=0.05 MIU/L-ACNC: 3.44 UIU/ML (ref 0.27–4.2)
UROBILINOGEN UR QL STRIP: ABNORMAL
WBC NRBC COR # BLD: 6.8 10*3/MM3 (ref 3.4–10.8)

## 2022-05-27 PROCEDURE — 84439 ASSAY OF FREE THYROXINE: CPT | Performed by: STUDENT IN AN ORGANIZED HEALTH CARE EDUCATION/TRAINING PROGRAM

## 2022-05-27 PROCEDURE — 85014 HEMATOCRIT: CPT

## 2022-05-27 PROCEDURE — 0 IOPAMIDOL PER 1 ML: Performed by: STUDENT IN AN ORGANIZED HEALTH CARE EDUCATION/TRAINING PROGRAM

## 2022-05-27 PROCEDURE — 70498 CT ANGIOGRAPHY NECK: CPT

## 2022-05-27 PROCEDURE — 81003 URINALYSIS AUTO W/O SCOPE: CPT | Performed by: STUDENT IN AN ORGANIZED HEALTH CARE EDUCATION/TRAINING PROGRAM

## 2022-05-27 PROCEDURE — 83735 ASSAY OF MAGNESIUM: CPT | Performed by: STUDENT IN AN ORGANIZED HEALTH CARE EDUCATION/TRAINING PROGRAM

## 2022-05-27 PROCEDURE — 96375 TX/PRO/DX INJ NEW DRUG ADDON: CPT

## 2022-05-27 PROCEDURE — 99283 EMERGENCY DEPT VISIT LOW MDM: CPT

## 2022-05-27 PROCEDURE — 36415 COLL VENOUS BLD VENIPUNCTURE: CPT

## 2022-05-27 PROCEDURE — 80053 COMPREHEN METABOLIC PANEL: CPT | Performed by: STUDENT IN AN ORGANIZED HEALTH CARE EDUCATION/TRAINING PROGRAM

## 2022-05-27 PROCEDURE — 84484 ASSAY OF TROPONIN QUANT: CPT | Performed by: STUDENT IN AN ORGANIZED HEALTH CARE EDUCATION/TRAINING PROGRAM

## 2022-05-27 PROCEDURE — 99284 EMERGENCY DEPT VISIT MOD MDM: CPT

## 2022-05-27 PROCEDURE — 70450 CT HEAD/BRAIN W/O DYE: CPT

## 2022-05-27 PROCEDURE — 25010000002 PROCHLORPERAZINE 10 MG/2ML SOLUTION: Performed by: STUDENT IN AN ORGANIZED HEALTH CARE EDUCATION/TRAINING PROGRAM

## 2022-05-27 PROCEDURE — 93005 ELECTROCARDIOGRAM TRACING: CPT | Performed by: STUDENT IN AN ORGANIZED HEALTH CARE EDUCATION/TRAINING PROGRAM

## 2022-05-27 PROCEDURE — 84443 ASSAY THYROID STIM HORMONE: CPT | Performed by: STUDENT IN AN ORGANIZED HEALTH CARE EDUCATION/TRAINING PROGRAM

## 2022-05-27 PROCEDURE — 96361 HYDRATE IV INFUSION ADD-ON: CPT

## 2022-05-27 PROCEDURE — 25010000002 DIPHENHYDRAMINE PER 50 MG: Performed by: STUDENT IN AN ORGANIZED HEALTH CARE EDUCATION/TRAINING PROGRAM

## 2022-05-27 PROCEDURE — 70496 CT ANGIOGRAPHY HEAD: CPT

## 2022-05-27 PROCEDURE — 80047 BASIC METABLC PNL IONIZED CA: CPT

## 2022-05-27 PROCEDURE — 85025 COMPLETE CBC W/AUTO DIFF WBC: CPT | Performed by: STUDENT IN AN ORGANIZED HEALTH CARE EDUCATION/TRAINING PROGRAM

## 2022-05-27 PROCEDURE — 85610 PROTHROMBIN TIME: CPT

## 2022-05-27 PROCEDURE — 0042T HC CT CEREBRAL PERFUSION W/WO CONTRAST: CPT

## 2022-05-27 RX ORDER — PROCHLORPERAZINE EDISYLATE 5 MG/ML
5 INJECTION INTRAMUSCULAR; INTRAVENOUS ONCE
Status: COMPLETED | OUTPATIENT
Start: 2022-05-27 | End: 2022-05-27

## 2022-05-27 RX ORDER — DIPHENHYDRAMINE HYDROCHLORIDE 50 MG/ML
25 INJECTION INTRAMUSCULAR; INTRAVENOUS ONCE
Status: COMPLETED | OUTPATIENT
Start: 2022-05-27 | End: 2022-05-27

## 2022-05-27 RX ORDER — MAGNESIUM SULFATE 1 G/100ML
1 INJECTION INTRAVENOUS ONCE
Status: COMPLETED | OUTPATIENT
Start: 2022-05-27 | End: 2022-05-28

## 2022-05-27 RX ORDER — ACETAMINOPHEN 500 MG
1000 TABLET ORAL ONCE
Status: COMPLETED | OUTPATIENT
Start: 2022-05-27 | End: 2022-05-27

## 2022-05-27 RX ADMIN — PROCHLORPERAZINE EDISYLATE 5 MG: 5 INJECTION INTRAMUSCULAR; INTRAVENOUS at 23:02

## 2022-05-27 RX ADMIN — ACETAMINOPHEN 1000 MG: 500 TABLET ORAL at 23:01

## 2022-05-27 RX ADMIN — SODIUM CHLORIDE, POTASSIUM CHLORIDE, SODIUM LACTATE AND CALCIUM CHLORIDE 1000 ML: 600; 310; 30; 20 INJECTION, SOLUTION INTRAVENOUS at 23:10

## 2022-05-27 RX ADMIN — DIPHENHYDRAMINE HYDROCHLORIDE 25 MG: 50 INJECTION, SOLUTION INTRAMUSCULAR; INTRAVENOUS at 23:02

## 2022-05-27 RX ADMIN — IOPAMIDOL 100 ML: 755 INJECTION, SOLUTION INTRAVENOUS at 22:42

## 2022-05-28 ENCOUNTER — APPOINTMENT (OUTPATIENT)
Dept: MRI IMAGING | Facility: HOSPITAL | Age: 42
End: 2022-05-28

## 2022-05-28 VITALS
WEIGHT: 170 LBS | OXYGEN SATURATION: 100 % | HEIGHT: 68 IN | BODY MASS INDEX: 25.76 KG/M2 | TEMPERATURE: 98.3 F | HEART RATE: 102 BPM | DIASTOLIC BLOOD PRESSURE: 121 MMHG | SYSTOLIC BLOOD PRESSURE: 141 MMHG | RESPIRATION RATE: 20 BRPM

## 2022-05-28 PROCEDURE — 70551 MRI BRAIN STEM W/O DYE: CPT

## 2022-05-28 PROCEDURE — 96365 THER/PROPH/DIAG IV INF INIT: CPT

## 2022-05-28 PROCEDURE — 25010000002 MAGNESIUM SULFATE IN D5W 1G/100ML (PREMIX) 1-5 GM/100ML-% SOLUTION: Performed by: STUDENT IN AN ORGANIZED HEALTH CARE EDUCATION/TRAINING PROGRAM

## 2022-05-28 PROCEDURE — 72141 MRI NECK SPINE W/O DYE: CPT

## 2022-05-28 RX ORDER — CALCIUM CARBONATE 200(500)MG
2 TABLET,CHEWABLE ORAL ONCE
Status: DISCONTINUED | OUTPATIENT
Start: 2022-05-28 | End: 2022-05-28 | Stop reason: HOSPADM

## 2022-05-28 RX ADMIN — MAGNESIUM SULFATE IN DEXTROSE 1 G: 10 INJECTION, SOLUTION INTRAVENOUS at 00:58

## 2022-06-13 ENCOUNTER — OFFICE VISIT (OUTPATIENT)
Dept: NEUROSURGERY | Facility: CLINIC | Age: 42
End: 2022-06-13

## 2022-06-13 VITALS
BODY MASS INDEX: 26.37 KG/M2 | WEIGHT: 174 LBS | HEIGHT: 68 IN | TEMPERATURE: 97.3 F | SYSTOLIC BLOOD PRESSURE: 112 MMHG | DIASTOLIC BLOOD PRESSURE: 68 MMHG

## 2022-06-13 DIAGNOSIS — D32.0 BENIGN MENINGIOMA OF BRAIN: Primary | ICD-10-CM

## 2022-06-13 PROCEDURE — 99214 OFFICE O/P EST MOD 30 MIN: CPT | Performed by: NEUROLOGICAL SURGERY

## 2022-06-13 RX ORDER — LEVOMEFOLATE CALCIUM 7.5 MG TABLET
1 TABLET DAILY
COMMUNITY
Start: 2022-06-06 | End: 2022-07-06

## 2022-06-13 RX ORDER — FAMOTIDINE 20 MG/1
TABLET, FILM COATED ORAL
COMMUNITY
Start: 2022-06-11
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
## 2022-06-17 ENCOUNTER — TELEMEDICINE (OUTPATIENT)
Dept: PSYCHIATRY | Facility: CLINIC | Age: 42
End: 2022-06-17

## 2022-06-17 DIAGNOSIS — M54.9 CHRONIC NECK AND BACK PAIN: ICD-10-CM

## 2022-06-17 DIAGNOSIS — M54.2 CHRONIC NECK AND BACK PAIN: ICD-10-CM

## 2022-06-17 DIAGNOSIS — G43.719 INTRACTABLE CHRONIC MIGRAINE WITHOUT AURA AND WITHOUT STATUS MIGRAINOSUS: ICD-10-CM

## 2022-06-17 DIAGNOSIS — G89.29 CHRONIC NECK AND BACK PAIN: ICD-10-CM

## 2022-06-17 DIAGNOSIS — F43.23 ADJUSTMENT DISORDER WITH MIXED ANXIETY AND DEPRESSED MOOD: Primary | ICD-10-CM

## 2022-06-17 PROCEDURE — 90791 PSYCH DIAGNOSTIC EVALUATION: CPT | Performed by: STUDENT IN AN ORGANIZED HEALTH CARE EDUCATION/TRAINING PROGRAM

## 2022-06-29 ENCOUNTER — OFFICE VISIT (OUTPATIENT)
Dept: NEUROSURGERY | Facility: CLINIC | Age: 42
End: 2022-06-29

## 2022-06-29 ENCOUNTER — HOSPITAL ENCOUNTER (OUTPATIENT)
Dept: NEUROLOGY | Facility: HOSPITAL | Age: 42
Discharge: HOME | End: 2022-06-29
Admitting: PHYSICIAN ASSISTANT

## 2022-06-29 VITALS — WEIGHT: 166.6 LBS | HEIGHT: 68 IN | BODY MASS INDEX: 25.25 KG/M2 | TEMPERATURE: 97.5 F

## 2022-06-29 DIAGNOSIS — R29.898 HAND WEAKNESS: ICD-10-CM

## 2022-06-29 DIAGNOSIS — D32.0 BENIGN MENINGIOMA OF BRAIN: Primary | ICD-10-CM

## 2022-06-29 DIAGNOSIS — M54.2 CHRONIC NECK PAIN: ICD-10-CM

## 2022-06-29 DIAGNOSIS — G89.29 CHRONIC NECK PAIN: ICD-10-CM

## 2022-06-29 DIAGNOSIS — G56.03 BILATERAL CARPAL TUNNEL SYNDROME: ICD-10-CM

## 2022-06-29 DIAGNOSIS — G56.21 LESION OF RIGHT ULNAR NERVE: ICD-10-CM

## 2022-06-29 PROCEDURE — 95911 NRV CNDJ TEST 9-10 STUDIES: CPT | Performed by: PSYCHIATRY & NEUROLOGY

## 2022-06-29 PROCEDURE — 95886 MUSC TEST DONE W/N TEST COMP: CPT

## 2022-06-29 PROCEDURE — 99212 OFFICE O/P EST SF 10 MIN: CPT | Performed by: PHYSICIAN ASSISTANT

## 2022-06-29 PROCEDURE — 95886 MUSC TEST DONE W/N TEST COMP: CPT | Performed by: PSYCHIATRY & NEUROLOGY

## 2022-06-29 PROCEDURE — 95911 NRV CNDJ TEST 9-10 STUDIES: CPT
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
## 2022-07-01 ENCOUNTER — OFFICE VISIT (OUTPATIENT)
Dept: PSYCHIATRY | Facility: CLINIC | Age: 42
End: 2022-07-01

## 2022-07-01 DIAGNOSIS — F43.23 ADJUSTMENT DISORDER WITH MIXED ANXIETY AND DEPRESSED MOOD: Primary | ICD-10-CM

## 2022-07-01 DIAGNOSIS — M54.9 CHRONIC NECK AND BACK PAIN: ICD-10-CM

## 2022-07-01 DIAGNOSIS — M54.2 CHRONIC NECK AND BACK PAIN: ICD-10-CM

## 2022-07-01 DIAGNOSIS — Z86.69 HISTORY OF MIGRAINE WITH AURA: ICD-10-CM

## 2022-07-01 DIAGNOSIS — G89.29 CHRONIC NECK AND BACK PAIN: ICD-10-CM

## 2022-07-01 PROCEDURE — 90837 PSYTX W PT 60 MINUTES: CPT | Performed by: STUDENT IN AN ORGANIZED HEALTH CARE EDUCATION/TRAINING PROGRAM

## 2022-07-08 ENCOUNTER — OFFICE VISIT (OUTPATIENT)
Dept: PSYCHIATRY | Facility: CLINIC | Age: 42
End: 2022-07-08

## 2022-07-08 DIAGNOSIS — G89.29 CHRONIC NECK AND BACK PAIN: ICD-10-CM

## 2022-07-08 DIAGNOSIS — M54.2 CHRONIC NECK AND BACK PAIN: ICD-10-CM

## 2022-07-08 DIAGNOSIS — F43.23 ADJUSTMENT DISORDER WITH MIXED ANXIETY AND DEPRESSED MOOD: Primary | ICD-10-CM

## 2022-07-08 DIAGNOSIS — M54.9 CHRONIC NECK AND BACK PAIN: ICD-10-CM

## 2022-07-08 DIAGNOSIS — Z86.69 HISTORY OF MIGRAINE WITH AURA: ICD-10-CM

## 2022-07-08 PROCEDURE — 90837 PSYTX W PT 60 MINUTES: CPT | Performed by: STUDENT IN AN ORGANIZED HEALTH CARE EDUCATION/TRAINING PROGRAM

## 2022-07-08 RX ORDER — LEVOMEFOLATE CALCIUM 7.5 MG
7.5 TABLET ORAL DAILY
COMMUNITY
Start: 2022-06-13

## 2022-07-15 ENCOUNTER — OFFICE VISIT (OUTPATIENT)
Dept: PSYCHIATRY | Facility: CLINIC | Age: 42
End: 2022-07-15

## 2022-07-15 DIAGNOSIS — F43.23 ADJUSTMENT DISORDER WITH MIXED ANXIETY AND DEPRESSED MOOD: Primary | ICD-10-CM

## 2022-07-15 DIAGNOSIS — Z86.69 HISTORY OF MIGRAINE WITH AURA: ICD-10-CM

## 2022-07-15 DIAGNOSIS — M54.2 CHRONIC NECK AND BACK PAIN: ICD-10-CM

## 2022-07-15 DIAGNOSIS — M54.9 CHRONIC NECK AND BACK PAIN: ICD-10-CM

## 2022-07-15 DIAGNOSIS — G89.29 CHRONIC NECK AND BACK PAIN: ICD-10-CM

## 2022-07-15 PROCEDURE — 90837 PSYTX W PT 60 MINUTES: CPT | Performed by: STUDENT IN AN ORGANIZED HEALTH CARE EDUCATION/TRAINING PROGRAM

## 2022-08-12 ENCOUNTER — OFFICE VISIT (OUTPATIENT)
Dept: PSYCHIATRY | Facility: CLINIC | Age: 42
End: 2022-08-12

## 2022-08-12 DIAGNOSIS — Z86.69 HISTORY OF MIGRAINE WITH AURA: ICD-10-CM

## 2022-08-12 DIAGNOSIS — G89.29 CHRONIC NECK AND BACK PAIN: ICD-10-CM

## 2022-08-12 DIAGNOSIS — M54.9 CHRONIC NECK AND BACK PAIN: ICD-10-CM

## 2022-08-12 DIAGNOSIS — F43.23 ADJUSTMENT DISORDER WITH MIXED ANXIETY AND DEPRESSED MOOD: Primary | ICD-10-CM

## 2022-08-12 DIAGNOSIS — M54.2 CHRONIC NECK AND BACK PAIN: ICD-10-CM

## 2022-08-12 PROCEDURE — 90837 PSYTX W PT 60 MINUTES: CPT | Performed by: STUDENT IN AN ORGANIZED HEALTH CARE EDUCATION/TRAINING PROGRAM

## 2022-08-16 NOTE — PROGRESS NOTES
Telehealth Visit      Patient Name: Sylvia Douglass  : 1980   MRN: [id number]     Referring Physician: Lisset Velásquez MD    Chief Complaint:  No diagnosis found.     This provider is located at [facility], using a telephone in a secure private environment. The Patient is seen remotely at their home address in [place], using a private telephone.  The patient is unable to be seen through a MyChart Video Visit through [facility] at today's encounter because ***, therefore a telephone encounter was conducted. Patient is being evaluated/treated via telehealth by telephone, and stated they are in a secure environment for this session. The patient's condition being diagnosed/treated is appropriate for telemedicine. The provider identified herself as well as her credentials.   The patient, and/or patient's guardian, consent to be seen remotely, and when consent is given they understand that the consent allows for patient identifiable information to be sent to a third party as needed.   They may refuse to be seen remotely at any time. The electronic data is encrypted and password protected, and the patient and/or guardian has been advised of the potential risks to privacy not withstanding such measures.    You have chosen to receive care through a telephone visit. Do you consent to use a telephone visit for your medical care today? ***  This visit has been rescheduled as a phone visit to comply with patient safety concerns in accordance with CDC recommendations.    History of Present Illness: Sylvia Douglass is a 42 y.o. female who I spoke with on the phone today for ***       Subjective      Review of Systems:   The following portions of the patient's history were reviewed and updates as appropriate: allergies, current medications, past family history, past medical history, past social history, past surgical history and problem list.      Interval History:   {interval history:48667}    Side Effects  ***    Medications:     Current Outpatient Medications:   •  ALPRAZolam (XANAX) 0.5 MG tablet, Take 0.5 mg by mouth., Disp: , Rfl:   •  aluminum chloride (Drysol) 20 % external solution, Drysol 20 % topical solution  APPLY TO AFFECTED AREA START 2X WEEK INCREASE AS TOLERATED TO QHS, Disp: , Rfl:   •  bimatoprost (LATISSE) 0.03 % ophthalmic solution, Latisse 0.03 % eyelash drops  APPLY 1 DROP TO APPLICATOR AND APPLY TO UPPER EYELID, ALONG EYELASHES, BY TOPICAL ROUTE ONCE DAILY AT NIGHTTIME, Disp: , Rfl:   •  BIOTIN PO, biotin, Disp: , Rfl:   •  butalbital-acetaminophen-caffeine (FIORICET, ESGIC) -40 MG per tablet, , Disp: , Rfl:   •  cyclobenzaprine (FLEXERIL) 10 MG tablet, Take 1 tablet by mouth 3 (Three) Times a Day., Disp: 15 tablet, Rfl: 0  •  EPINEPHrine (EPIPEN) 0.3 MG/0.3ML solution auto-injector injection, , Disp: , Rfl:   •  famotidine (PEPCID) 20 MG tablet, , Disp: , Rfl:   •  HYDROcodone-acetaminophen (NORCO) 7.5-325 MG per tablet, , Disp: , Rfl:   •  hydrOXYzine (ATARAX) 25 MG tablet, , Disp: , Rfl:   •  l-methylfolate calcium (DEPLIN) 7.5 MG tablet tablet, Take 7.5 mg by mouth Daily., Disp: , Rfl:   •  Loratadine 10 MG capsule, loratadine, Disp: , Rfl:   •  Nutritional Supplements (VITAMIN D BOOSTER PO), Vitamin D2, Disp: , Rfl:   •  ondansetron (ZOFRAN) 8 MG tablet, , Disp: , Rfl:   •  promethazine (PHENERGAN) 25 MG tablet, Every 4 (Four) Hours., Disp: , Rfl:   •  pseudoephedrine (SUDAFED) 120 MG 12 hr tablet, Every 12 (Twelve) Hours., Disp: , Rfl:   •  rizatriptan (MAXALT) 10 MG tablet, , Disp: , Rfl:     PHQ-9 Depression Screening  {PHQ-9 Score:13151:::1}    Objective     Physical Exam:  Vital Signs:   Due to extenuating circumstances and possible current health risks associated with the patient being present in a clinical setting (with current health restrictions in place in regards to possible COVID 19 transmission/exposure),  the patient was seen remotely today via a telephone visit. Unable to obtain vital signs due to nature of remote visit.  Height stated at *** inches.  Weight stated at *** pounds.     Physical Exam    Mental Status Exam:   Appearance: ***  Hygiene: ***  Cooperation: ***  Eye Contact: ***  Psychomotor Behavior: ***   Mood: ***  Affect: ***  Speech: ***  Thought Process: ***  Thought Content: ***  Suicidal: ***   Homicidal: ***  Hallucinations: ***   Delusion: ***  Memory: ***  Orientation: ***  Reliability: ***  Insight: ***  Judgement: ***  Impulse Control: ***     Quality Measures:   {[facility]_Smoking_Cessation:80327}    I advised Sylvia Douglass of the risks of tobacco use.       Assessment / Plan      Assessment/Plan:   There are no diagnoses linked to this encounter.     TREATMENT PLAN/GOALS: Continue supportive psychotherapy efforts and medications as indicated. Treatment and medication options discussed during today's visit. Patient ackowledged and verbally consented to continue with current treatment plan and was educated on the importance of compliance with treatment and follow-up appointments. Patient seems reasonably able to adhere to treatment plan.      MEDICATION ISSUES:  INSPECT reviewed as expected  Discussed medication options and treatment plan of prescribed medication as well as the risks, benefits, and side effects including potential falls, possible impaired driving and metabolic adversities among others. Patient is agreeable to call the office with any worsening of symptoms or onset of side effects. Patient is agreeable to call 911 or go to the nearest Emergency Room should he/she begin having Suicidal Ideation or Homicidal Ideations. No medication side effects or related complaints today.     Follow Up:   No follow-ups on file.      Rubi Garduno, PhD, Psychologist
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
## 2022-08-19 ENCOUNTER — OFFICE VISIT (OUTPATIENT)
Dept: PSYCHIATRY | Facility: CLINIC | Age: 42
End: 2022-08-19
Payer: MEDICAID

## 2022-08-19 DIAGNOSIS — G89.29 CHRONIC NECK AND BACK PAIN: ICD-10-CM

## 2022-08-19 DIAGNOSIS — F43.23 ADJUSTMENT DISORDER WITH MIXED ANXIETY AND DEPRESSED MOOD: Primary | ICD-10-CM

## 2022-08-19 DIAGNOSIS — M54.9 CHRONIC NECK AND BACK PAIN: ICD-10-CM

## 2022-08-19 DIAGNOSIS — M54.2 CHRONIC NECK AND BACK PAIN: ICD-10-CM

## 2022-08-19 DIAGNOSIS — Z86.69 HISTORY OF MIGRAINE WITH AURA: ICD-10-CM

## 2022-08-19 PROCEDURE — 90834 PSYTX W PT 45 MINUTES: CPT | Performed by: STUDENT IN AN ORGANIZED HEALTH CARE EDUCATION/TRAINING PROGRAM

## 2022-08-26 ENCOUNTER — OFFICE VISIT (OUTPATIENT)
Dept: PSYCHIATRY | Facility: CLINIC | Age: 42
End: 2022-08-26
Payer: MEDICAID

## 2022-08-26 DIAGNOSIS — Z56.0 UNEMPLOYMENT: ICD-10-CM

## 2022-08-26 DIAGNOSIS — M54.9 CHRONIC NECK AND BACK PAIN: ICD-10-CM

## 2022-08-26 DIAGNOSIS — Z86.69 HISTORY OF MIGRAINE WITH AURA: ICD-10-CM

## 2022-08-26 DIAGNOSIS — G89.29 CHRONIC NECK AND BACK PAIN: ICD-10-CM

## 2022-08-26 DIAGNOSIS — M54.2 CHRONIC NECK AND BACK PAIN: ICD-10-CM

## 2022-08-26 DIAGNOSIS — F43.23 ADJUSTMENT DISORDER WITH MIXED ANXIETY AND DEPRESSED MOOD: Primary | ICD-10-CM

## 2022-08-26 PROCEDURE — 90834 PSYTX W PT 45 MINUTES: CPT | Performed by: STUDENT IN AN ORGANIZED HEALTH CARE EDUCATION/TRAINING PROGRAM

## 2022-08-26 SDOH — ECONOMIC STABILITY - INCOME SECURITY: UNEMPLOYMENT, UNSPECIFIED: Z56.0
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
## 2022-09-02 ENCOUNTER — OFFICE VISIT (OUTPATIENT)
Dept: PSYCHIATRY | Facility: CLINIC | Age: 42
End: 2022-09-02
Payer: MEDICAID

## 2022-09-02 DIAGNOSIS — G89.29 CHRONIC NECK AND BACK PAIN: ICD-10-CM

## 2022-09-02 DIAGNOSIS — F43.23 ADJUSTMENT DISORDER WITH MIXED ANXIETY AND DEPRESSED MOOD: Primary | ICD-10-CM

## 2022-09-02 DIAGNOSIS — Z86.69 HISTORY OF MIGRAINE WITH AURA: ICD-10-CM

## 2022-09-02 DIAGNOSIS — F43.9 TRAUMA AND STRESSOR-RELATED DISORDER: ICD-10-CM

## 2022-09-02 DIAGNOSIS — M54.2 CHRONIC NECK AND BACK PAIN: ICD-10-CM

## 2022-09-02 DIAGNOSIS — M54.9 CHRONIC NECK AND BACK PAIN: ICD-10-CM

## 2022-09-02 PROCEDURE — 90834 PSYTX W PT 45 MINUTES: CPT | Performed by: STUDENT IN AN ORGANIZED HEALTH CARE EDUCATION/TRAINING PROGRAM

## 2022-09-09 ENCOUNTER — OFFICE VISIT (OUTPATIENT)
Dept: PSYCHIATRY | Facility: CLINIC | Age: 42
End: 2022-09-09
Payer: MEDICAID

## 2022-09-09 DIAGNOSIS — F43.23 ADJUSTMENT DISORDER WITH MIXED ANXIETY AND DEPRESSED MOOD: Primary | ICD-10-CM

## 2022-09-09 DIAGNOSIS — F43.9 TRAUMA AND STRESSOR-RELATED DISORDER: ICD-10-CM

## 2022-09-09 DIAGNOSIS — Z86.69 HISTORY OF MIGRAINE WITH AURA: ICD-10-CM

## 2022-09-09 DIAGNOSIS — M54.9 CHRONIC NECK AND BACK PAIN: ICD-10-CM

## 2022-09-09 DIAGNOSIS — G89.29 CHRONIC NECK AND BACK PAIN: ICD-10-CM

## 2022-09-09 DIAGNOSIS — M54.2 CHRONIC NECK AND BACK PAIN: ICD-10-CM

## 2022-09-09 PROCEDURE — 90834 PSYTX W PT 45 MINUTES: CPT | Performed by: STUDENT IN AN ORGANIZED HEALTH CARE EDUCATION/TRAINING PROGRAM
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
## 2022-09-19 ENCOUNTER — OFFICE VISIT (OUTPATIENT)
Dept: PSYCHIATRY | Facility: CLINIC | Age: 42
End: 2022-09-19
Payer: MEDICAID

## 2022-09-19 DIAGNOSIS — F43.23 ADJUSTMENT DISORDER WITH MIXED ANXIETY AND DEPRESSED MOOD: Primary | ICD-10-CM

## 2022-09-19 DIAGNOSIS — M54.2 CHRONIC NECK AND BACK PAIN: ICD-10-CM

## 2022-09-19 DIAGNOSIS — Z86.69 HISTORY OF MIGRAINE WITH AURA: ICD-10-CM

## 2022-09-19 DIAGNOSIS — M54.9 CHRONIC NECK AND BACK PAIN: ICD-10-CM

## 2022-09-19 DIAGNOSIS — G89.29 CHRONIC NECK AND BACK PAIN: ICD-10-CM

## 2022-09-19 DIAGNOSIS — F43.9 TRAUMA AND STRESSOR-RELATED DISORDER: ICD-10-CM

## 2022-09-19 PROCEDURE — 90834 PSYTX W PT 45 MINUTES: CPT | Performed by: STUDENT IN AN ORGANIZED HEALTH CARE EDUCATION/TRAINING PROGRAM

## 2022-09-26 ENCOUNTER — OFFICE VISIT (OUTPATIENT)
Dept: PSYCHIATRY | Facility: CLINIC | Age: 42
End: 2022-09-26
Payer: MEDICAID

## 2022-09-26 DIAGNOSIS — M54.2 CHRONIC NECK AND BACK PAIN: ICD-10-CM

## 2022-09-26 DIAGNOSIS — Z86.69 HISTORY OF MIGRAINE WITH AURA: ICD-10-CM

## 2022-09-26 DIAGNOSIS — M54.9 CHRONIC NECK AND BACK PAIN: ICD-10-CM

## 2022-09-26 DIAGNOSIS — G89.29 CHRONIC NECK AND BACK PAIN: ICD-10-CM

## 2022-09-26 DIAGNOSIS — F43.9 TRAUMA AND STRESSOR-RELATED DISORDER: ICD-10-CM

## 2022-09-26 DIAGNOSIS — F43.23 ADJUSTMENT DISORDER WITH MIXED ANXIETY AND DEPRESSED MOOD: Primary | ICD-10-CM

## 2022-09-26 PROCEDURE — 90837 PSYTX W PT 60 MINUTES: CPT | Performed by: STUDENT IN AN ORGANIZED HEALTH CARE EDUCATION/TRAINING PROGRAM

## 2022-10-03 ENCOUNTER — OFFICE VISIT (OUTPATIENT)
Dept: PSYCHIATRY | Facility: CLINIC | Age: 42
End: 2022-10-03
Payer: MEDICAID

## 2022-10-03 DIAGNOSIS — Z86.69 HISTORY OF MIGRAINE WITH AURA: ICD-10-CM

## 2022-10-03 DIAGNOSIS — F43.9 TRAUMA AND STRESSOR-RELATED DISORDER: ICD-10-CM

## 2022-10-03 DIAGNOSIS — M54.2 CHRONIC NECK AND BACK PAIN: ICD-10-CM

## 2022-10-03 DIAGNOSIS — M54.9 CHRONIC NECK AND BACK PAIN: ICD-10-CM

## 2022-10-03 DIAGNOSIS — G89.29 CHRONIC NECK AND BACK PAIN: ICD-10-CM

## 2022-10-03 DIAGNOSIS — F43.23 ADJUSTMENT DISORDER WITH MIXED ANXIETY AND DEPRESSED MOOD: Primary | ICD-10-CM

## 2022-10-03 PROCEDURE — 90834 PSYTX W PT 45 MINUTES: CPT | Performed by: STUDENT IN AN ORGANIZED HEALTH CARE EDUCATION/TRAINING PROGRAM
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
## 2022-10-10 ENCOUNTER — TELEPHONE (OUTPATIENT)
Dept: NEUROSURGERY | Facility: CLINIC | Age: 42
End: 2022-10-10

## 2022-10-10 ENCOUNTER — OFFICE VISIT (OUTPATIENT)
Dept: PSYCHIATRY | Facility: CLINIC | Age: 42
End: 2022-10-10
Payer: MEDICAID

## 2022-10-10 DIAGNOSIS — G89.29 CHRONIC BILATERAL LOW BACK PAIN, UNSPECIFIED WHETHER SCIATICA PRESENT: ICD-10-CM

## 2022-10-10 DIAGNOSIS — M54.2 CHRONIC NECK AND BACK PAIN: ICD-10-CM

## 2022-10-10 DIAGNOSIS — F43.9 TRAUMA AND STRESSOR-RELATED DISORDER: ICD-10-CM

## 2022-10-10 DIAGNOSIS — F43.23 ADJUSTMENT DISORDER WITH MIXED ANXIETY AND DEPRESSED MOOD: Primary | ICD-10-CM

## 2022-10-10 DIAGNOSIS — G89.29 CHRONIC NECK PAIN: Primary | ICD-10-CM

## 2022-10-10 DIAGNOSIS — Z86.69 HISTORY OF MIGRAINE WITH AURA: ICD-10-CM

## 2022-10-10 DIAGNOSIS — M54.50 CHRONIC BILATERAL LOW BACK PAIN, UNSPECIFIED WHETHER SCIATICA PRESENT: ICD-10-CM

## 2022-10-10 DIAGNOSIS — G89.29 CHRONIC NECK AND BACK PAIN: ICD-10-CM

## 2022-10-10 DIAGNOSIS — M54.9 CHRONIC NECK AND BACK PAIN: ICD-10-CM

## 2022-10-10 DIAGNOSIS — G43.719 INTRACTABLE CHRONIC MIGRAINE WITHOUT AURA AND WITHOUT STATUS MIGRAINOSUS: ICD-10-CM

## 2022-10-10 DIAGNOSIS — M54.2 CHRONIC NECK PAIN: Primary | ICD-10-CM

## 2022-10-10 PROCEDURE — 90834 PSYTX W PT 45 MINUTES: CPT | Performed by: STUDENT IN AN ORGANIZED HEALTH CARE EDUCATION/TRAINING PROGRAM

## 2022-10-10 NOTE — TELEPHONE ENCOUNTER
S/w patient and relayed PA's message.     Patient asked about the chiropractor to see if Dr. Wright has anyone specific? I told her no, but we normally advise patients to not let them do any neck manipulation. Patient voiced understanding.     Patient would like the PT order faxed to [facility]. The fax number is [phone]

## 2022-10-10 NOTE — TELEPHONE ENCOUNTER
Provider:  Kyle  Surgery/Procedure:  EMG & NCT  Surgery/Procedure Date:  6-29-22  Last visit:   6-29-22  Next visit: na     Reason for call:  Patient called and wanted to know if Dr. Wright would give her a referral to PT for her neck and back pain.  She also wanted to know if it is ok to see a chiropractor and if so who would Dr. Wright recommend?   Patient had been going to PT for her back issues,  but had to stop and now she needs a new order because it has been to long.  She is going to [facility]  PT   The fax number is [phone]  The patient is requesting a call back if this is approved and to let her know about the chiropractor.  Please Advise. Thank you.

## 2022-10-11 NOTE — TELEPHONE ENCOUNTER
[address] [place] [address] [address] Phone: [phone] Fax: [phone] Patient: Matt Cleaning MRN: [id number]  SSN: [id number] YOB: 1938  Age: 80 y.o. Sex: male Return Appointment: 2 weeks with Kevin Adams MD 
 
Instructions: Left anterior lower leg Cleanse wound and periwound with wound cleanser or normal saline. Hydrofera Blue: Cut to wound size, moisten with saline, and apply to wound bed. Cover with ABD pad 
2 layer compression with wound and lower extremity assessment. If there is any problem with the dressing (too tight, slides down, etc.) Patient to return to wound clinic to have re-wrapped by clinician. Patient is scheduled for outpatient surgery by Dr Dania Hobbs on Tuesday, 4/6/21 for removal of skin cancer with a skin graft to cover pending cardiac clearance. Stop taking your Xarelto 3 days prior to your surgery. Patient may return in 1 week for dressing change if cardiac clearance not received. Should you experience increased redness, swelling, pain, foul odor, size of wound(s), or have a temperature over 101 degrees please contact the [address] [place] [address] Road at [phone] or if after hours contact your primary care physician or go to the hospital emergency department. Signed By: Alyce Roca PT, [facility] April 2, 2021 Pt LVM stating that PT only received an order for neck but not back. Does pt need PT order for back as well? Please place PT order if necessary.

## 2022-10-12 ENCOUNTER — TELEPHONE (OUTPATIENT)
Dept: NEUROSURGERY | Facility: CLINIC | Age: 42
End: 2022-10-12

## 2022-10-12 NOTE — TELEPHONE ENCOUNTER
Caller: Sylvia Douglass    Relationship: Self    Best call back number: [phone]    What is the medical concern/diagnosis: LOW BACK     What specialty or service is being requested: PHYSICAL THERAPY    What is the provider, practice or medical service name: [facility]    What is the office location:     What is the office phone number: PLEASE FAX ORDER FOR PHYSICAL THERAPY  [phone]     Any additional details: AS OF 10/12/2022 4PM [facility] HAS REFERRAL FOR NECK BUT DID NOT RECEIVE REFERRAL FOR LOW BACK.    PLEASE FAX REFERRAL AS SOON AS POSSIBLE. NEXT APPT IS 10/17/2022 AM.  REFERRAL NEEDS TO BE FAXED BEFORE THEN    PLEASE NOTIFY PT WHEN THIS HAS BEEN DONE.

## 2022-10-14 ENCOUNTER — OFFICE VISIT (OUTPATIENT)
Dept: PSYCHIATRY | Facility: CLINIC | Age: 42
End: 2022-10-14
Payer: MEDICAID

## 2022-10-14 DIAGNOSIS — M54.9 CHRONIC NECK AND BACK PAIN: ICD-10-CM

## 2022-10-14 DIAGNOSIS — G89.29 CHRONIC NECK AND BACK PAIN: ICD-10-CM

## 2022-10-14 DIAGNOSIS — F43.23 ADJUSTMENT DISORDER WITH MIXED ANXIETY AND DEPRESSED MOOD: Primary | ICD-10-CM

## 2022-10-14 DIAGNOSIS — M54.2 CHRONIC NECK AND BACK PAIN: ICD-10-CM

## 2022-10-14 DIAGNOSIS — Z86.69 HISTORY OF MIGRAINE WITH AURA: ICD-10-CM

## 2022-10-14 DIAGNOSIS — F43.9 TRAUMA AND STRESSOR-RELATED DISORDER: ICD-10-CM

## 2022-10-14 PROCEDURE — 90839 PSYTX CRISIS INITIAL 60 MIN: CPT | Performed by: STUDENT IN AN ORGANIZED HEALTH CARE EDUCATION/TRAINING PROGRAM

## 2022-10-17 ENCOUNTER — OFFICE VISIT (OUTPATIENT)
Dept: PSYCHIATRY | Facility: CLINIC | Age: 42
End: 2022-10-17
Payer: MEDICAID

## 2022-10-17 DIAGNOSIS — F43.9 TRAUMA AND STRESSOR-RELATED DISORDER: ICD-10-CM

## 2022-10-17 DIAGNOSIS — F43.23 ADJUSTMENT DISORDER WITH MIXED ANXIETY AND DEPRESSED MOOD: Primary | ICD-10-CM

## 2022-10-17 DIAGNOSIS — M54.9 CHRONIC NECK AND BACK PAIN: ICD-10-CM

## 2022-10-17 DIAGNOSIS — Z86.69 HISTORY OF MIGRAINE WITH AURA: ICD-10-CM

## 2022-10-17 DIAGNOSIS — G89.29 CHRONIC NECK AND BACK PAIN: ICD-10-CM

## 2022-10-17 DIAGNOSIS — M54.2 CHRONIC NECK AND BACK PAIN: ICD-10-CM

## 2022-10-17 PROCEDURE — 90834 PSYTX W PT 45 MINUTES: CPT | Performed by: STUDENT IN AN ORGANIZED HEALTH CARE EDUCATION/TRAINING PROGRAM

## 2022-10-21 NOTE — TELEPHONE ENCOUNTER
Pt LVM stating that she has called several times regarding a PT referral for her low back. PT referral has been placed and the status is closed. Could you please check on the status of this referral and see if it was ever faxed to the number provided in encounter below?

## 2022-10-24 ENCOUNTER — OFFICE VISIT (OUTPATIENT)
Dept: PSYCHIATRY | Facility: CLINIC | Age: 42
End: 2022-10-24
Payer: MEDICAID

## 2022-10-24 DIAGNOSIS — M54.2 CHRONIC NECK AND BACK PAIN: ICD-10-CM

## 2022-10-24 DIAGNOSIS — Z86.69 HISTORY OF MIGRAINE WITH AURA: ICD-10-CM

## 2022-10-24 DIAGNOSIS — G89.29 CHRONIC NECK AND BACK PAIN: ICD-10-CM

## 2022-10-24 DIAGNOSIS — M54.9 CHRONIC NECK AND BACK PAIN: ICD-10-CM

## 2022-10-24 DIAGNOSIS — F43.9 TRAUMA AND STRESSOR-RELATED DISORDER: ICD-10-CM

## 2022-10-24 DIAGNOSIS — F43.23 ADJUSTMENT DISORDER WITH MIXED ANXIETY AND DEPRESSED MOOD: Primary | ICD-10-CM

## 2022-10-24 PROCEDURE — 90834 PSYTX W PT 45 MINUTES: CPT | Performed by: STUDENT IN AN ORGANIZED HEALTH CARE EDUCATION/TRAINING PROGRAM

## 2022-11-02 ENCOUNTER — OFFICE VISIT (OUTPATIENT)
Dept: PSYCHIATRY | Facility: CLINIC | Age: 42
End: 2022-11-02
Payer: MEDICAID

## 2022-11-02 DIAGNOSIS — M54.9 CHRONIC NECK AND BACK PAIN: ICD-10-CM

## 2022-11-02 DIAGNOSIS — G89.29 CHRONIC NECK AND BACK PAIN: ICD-10-CM

## 2022-11-02 DIAGNOSIS — M54.2 CHRONIC NECK AND BACK PAIN: ICD-10-CM

## 2022-11-02 DIAGNOSIS — Z86.69 HISTORY OF MIGRAINE WITH AURA: ICD-10-CM

## 2022-11-02 DIAGNOSIS — F43.23 ADJUSTMENT DISORDER WITH MIXED ANXIETY AND DEPRESSED MOOD: Primary | ICD-10-CM

## 2022-11-02 DIAGNOSIS — F43.9 TRAUMA AND STRESSOR-RELATED DISORDER: ICD-10-CM

## 2022-11-02 PROCEDURE — 90834 PSYTX W PT 45 MINUTES: CPT | Performed by: STUDENT IN AN ORGANIZED HEALTH CARE EDUCATION/TRAINING PROGRAM

## 2022-11-10 ENCOUNTER — OFFICE VISIT (OUTPATIENT)
Dept: PSYCHIATRY | Facility: CLINIC | Age: 42
End: 2022-11-10
Payer: MEDICAID

## 2022-11-10 DIAGNOSIS — Z86.69 HISTORY OF MIGRAINE WITH AURA: ICD-10-CM

## 2022-11-10 DIAGNOSIS — F43.9 TRAUMA AND STRESSOR-RELATED DISORDER: ICD-10-CM

## 2022-11-10 DIAGNOSIS — M54.9 CHRONIC NECK AND BACK PAIN: ICD-10-CM

## 2022-11-10 DIAGNOSIS — M54.2 CHRONIC NECK AND BACK PAIN: ICD-10-CM

## 2022-11-10 DIAGNOSIS — F43.23 ADJUSTMENT DISORDER WITH MIXED ANXIETY AND DEPRESSED MOOD: Primary | ICD-10-CM

## 2022-11-10 DIAGNOSIS — G89.29 CHRONIC NECK AND BACK PAIN: ICD-10-CM

## 2022-11-10 PROCEDURE — 90837 PSYTX W PT 60 MINUTES: CPT | Performed by: STUDENT IN AN ORGANIZED HEALTH CARE EDUCATION/TRAINING PROGRAM

## 2022-11-15 ENCOUNTER — OFFICE VISIT (OUTPATIENT)
Dept: PSYCHIATRY | Facility: CLINIC | Age: 42
End: 2022-11-15
Payer: MEDICAID

## 2022-11-15 DIAGNOSIS — F43.9 TRAUMA AND STRESSOR-RELATED DISORDER: ICD-10-CM

## 2022-11-15 DIAGNOSIS — M54.9 CHRONIC NECK AND BACK PAIN: ICD-10-CM

## 2022-11-15 DIAGNOSIS — F43.23 ADJUSTMENT DISORDER WITH MIXED ANXIETY AND DEPRESSED MOOD: Primary | ICD-10-CM

## 2022-11-15 DIAGNOSIS — M54.2 CHRONIC NECK AND BACK PAIN: ICD-10-CM

## 2022-11-15 DIAGNOSIS — Z86.69 HISTORY OF MIGRAINE WITH AURA: ICD-10-CM

## 2022-11-15 DIAGNOSIS — Z56.0 UNEMPLOYMENT: ICD-10-CM

## 2022-11-15 DIAGNOSIS — G89.29 CHRONIC NECK AND BACK PAIN: ICD-10-CM

## 2022-11-15 PROCEDURE — 90834 PSYTX W PT 45 MINUTES: CPT | Performed by: STUDENT IN AN ORGANIZED HEALTH CARE EDUCATION/TRAINING PROGRAM

## 2022-11-15 SDOH — ECONOMIC STABILITY - INCOME SECURITY: UNEMPLOYMENT, UNSPECIFIED: Z56.0

## 2022-11-22 ENCOUNTER — OFFICE VISIT (OUTPATIENT)
Dept: PSYCHIATRY | Facility: CLINIC | Age: 42
End: 2022-11-22
Payer: MEDICAID

## 2022-11-22 DIAGNOSIS — Z86.69 HISTORY OF MIGRAINE WITH AURA: ICD-10-CM

## 2022-11-22 DIAGNOSIS — M54.9 CHRONIC NECK AND BACK PAIN: ICD-10-CM

## 2022-11-22 DIAGNOSIS — F43.9 TRAUMA AND STRESSOR-RELATED DISORDER: ICD-10-CM

## 2022-11-22 DIAGNOSIS — F43.23 ADJUSTMENT DISORDER WITH MIXED ANXIETY AND DEPRESSED MOOD: Primary | ICD-10-CM

## 2022-11-22 DIAGNOSIS — Z56.0 UNEMPLOYMENT: ICD-10-CM

## 2022-11-22 DIAGNOSIS — M54.2 CHRONIC NECK AND BACK PAIN: ICD-10-CM

## 2022-11-22 DIAGNOSIS — G89.29 CHRONIC NECK AND BACK PAIN: ICD-10-CM

## 2022-11-22 PROCEDURE — 90834 PSYTX W PT 45 MINUTES: CPT | Performed by: STUDENT IN AN ORGANIZED HEALTH CARE EDUCATION/TRAINING PROGRAM

## 2022-11-22 SDOH — ECONOMIC STABILITY - INCOME SECURITY: UNEMPLOYMENT, UNSPECIFIED: Z56.0

## 2022-11-28 NOTE — PROGRESS NOTES
"     Telehealth Visit      Patient Name: Sylvia Douglass  : 1980   MRN: [id number]   Start: 4:07 Stop: 5:02  Referring Physician: Lisset Velásquez MD    Chief Complaint:      ICD-10-CM ICD-9-CM   1. Adjustment disorder with mixed anxiety and depressed mood  F43.23 309.28   2. History of migraine with aura  Z86.69 V12.49   3. Chronic neck and back pain  M54.2 723.1    M54.9 724.5    G89.29 338.29        This provider is located at [facility], using a telephone in a secure private environment. The Patient is seen remotely at their home address in [place], using a private telephone.  The patient is unable to be seen through a MyChart Video Visit through [facility] at today's encounter because of internet connection difficulty, therefore a telephone encounter was conducted. Patient is being evaluated/treated via telehealth by telephone, and stated they are in a secure environment for this session. The patient's condition being diagnosed/treated is appropriate for telemedicine. The provider identified herself as well as her credentials.   The patient, and/or patient's guardian, consent to be seen remotely, and when consent is given they understand that the consent allows for patient identifiable information to be sent to a third party as needed.   They may refuse to be seen remotely at any time. The electronic data is encrypted and password protected, and the patient and/or guardian has been advised of the potential risks to privacy not withstanding such measures.    You have chosen to receive care through a telephone visit. Do you consent to use a telephone visit for your medical care today? YES.    History of Present Illness: Sylvia Douglass is a 42 y.o. female who I spoke with on the phone today for a follow up care navigation appointment. Medical records indicate that she has current diagnoses of an, \"asymptomatic vertex meningioma and " "neck pain\". She was recommended to repeat an MRI of the brain in two years or sooner if clinically indicated. Surgery due to neck pain was not indicated and she was referred for physical therapy and pain management.      Ms. Douglass reports having \"severe anxiety and depression\". Her current psychiatric medications include Xanax and Hydroxyzine. She attends psychotherapy sessions with her therapist, Sulma Carpenter LCSW, of the  clinic and has seen her for several years. More recently, she has consulted with a trauma specialist as recommended. She states she was, \"not deemed to be a good candidate for EMDR because I take Xanax\".  Subjective      Review of Systems:   The following portions of the patient's history were reviewed and updates as appropriate: allergies, current medications, past family history, past medical history, past social history, past surgical history and problem list.     Interval History:   Sylvia states that she has lost 25 lbs. In the past five weeks. A recent stressor for her has been that her mother and stepmother contracted COVID-19. She notes that over the past 5-6 days her body has been \"dropping out of survival mode\" and she notes being less anxious.     Medications:     Current Outpatient Medications:   •  ALPRAZolam (XANAX) 0.5 MG tablet, Take 0.5 mg by mouth., Disp: , Rfl:   •  aluminum chloride (Drysol) 20 % external solution, Drysol 20 % topical solution  APPLY TO AFFECTED AREA START 2X WEEK INCREASE AS TOLERATED TO QHS, Disp: , Rfl:   •  bimatoprost (LATISSE) 0.03 % ophthalmic solution, Latisse 0.03 % eyelash drops  APPLY 1 DROP TO APPLICATOR AND APPLY TO UPPER EYELID, ALONG EYELASHES, BY TOPICAL ROUTE ONCE DAILY AT NIGHTTIME, Disp: , Rfl:   •  BIOTIN PO, biotin, Disp: , Rfl:   •  butalbital-acetaminophen-caffeine (FIORICET, ESGIC) -40 MG per tablet, , Disp: , Rfl:   •  cyclobenzaprine (FLEXERIL) 10 MG tablet, Take 1 tablet by mouth 3 (Three) Times a Day., Disp: 15 tablet, " Rfl: 0  •  EPINEPHrine (EPIPEN) 0.3 MG/0.3ML solution auto-injector injection, , Disp: , Rfl:   •  famotidine (PEPCID) 20 MG tablet, , Disp: , Rfl:   •  HYDROcodone-acetaminophen (NORCO) 7.5-325 MG per tablet, , Disp: , Rfl:   •  hydrOXYzine (ATARAX) 25 MG tablet, , Disp: , Rfl:   •  l-methylfolate calcium (DEPLIN) 7.5 MG tablet tablet, Take 7.5 mg by mouth Daily., Disp: , Rfl:   •  Loratadine 10 MG capsule, loratadine, Disp: , Rfl:   •  Nutritional Supplements (VITAMIN D BOOSTER PO), Vitamin D2, Disp: , Rfl:   •  ondansetron (ZOFRAN) 8 MG tablet, , Disp: , Rfl:   •  promethazine (PHENERGAN) 25 MG tablet, Every 4 (Four) Hours., Disp: , Rfl:   •  pseudoephedrine (SUDAFED) 120 MG 12 hr tablet, Every 12 (Twelve) Hours., Disp: , Rfl:   •  rizatriptan (MAXALT) 10 MG tablet, , Disp: , Rfl:     Objective     Physical Exam:  Vital Signs:   Due to extenuating circumstances and possible current health risks associated with the patient being present in a clinical setting (with current health restrictions in place in regards to possible COVID 19 transmission/exposure), the patient was seen remotely today via a telephone visit. Unable to obtain vital signs due to nature of remote visit.    Physical Exam    Mental Status Exam:   Cooperation: Cooperative  Psychomotor Behavior: Unremarkable   Mood: Dysthymic with episodes of anxiety  Affect: Appropriate  Speech: Clear  Thought Process: Normal  Thought Content: Congruent to mood  Suicidal: No SI reported  Homicidal: No HI reported  Hallucinations: None  Delusion: None  Memory: Grossly intact  Orientation: Orientated x4  Reliability: Fair to Good  Insight: Fair  Judgement: Fair  Impulse Control: Influenced by mood and pain    Quality Measures:   Never smoker    I advised Sylvia Douglass of the risks of tobacco use.   Assessment / Plan      Assessment/Plan:   Diagnoses and all orders for this visit:    1. Adjustment disorder with mixed anxiety and depressed mood  (Primary)    2. History of migraine with aura    3. Chronic neck and back pain    Assisted the patient with identifying the negative health-related consequences of her psychiatric symptoms. She offered more insight into how increased depression has resulted in migraine headaches and a related side effect of weight loss. Collaboratively discussed the flight-or-fight response and identified related triggers, including increased fear related her boyfriend's when coming home from work. Instead of avoiding situations that trigger her anxiety, discussed the potential benefits of desensitization and communication. Reiterated the patient's ability to opening discuss her concerns in a calm, controlled way. Also, reaffirmed ability to utilize coping strategies (e.g., deep breathing, relaxation script prior to her boyfriend coming home, thought challenging, etc.) in order to minimize the frequency and intensity of panic driven responses.     Explored alternative treatment options for trauma including medication management. A current challenge for the patient appears related to her reported history of side effects to anti-depressant medications (e.g., stomaches, migraines, 'violent vomiting'). She does report undergoing genetic testing for medications and was encouraged to discuss options with her prescribing provider. Her current plan is to research somatic training as a form of trauma-informed treatment.     TREATMENT PLAN/GOALS:   1. Stabilize mood. Consult with prescribing provider about medication options for psychiatric symptoms. Ameliorate or decrease the frequency and severity of depression, anxiety, and panic.  2. Maintain scheduled appointments with treatment providers, including therapists, primary care providers, specialists, etc.  3. Implement relaxation strategies in her daily routine to combat stress and make chronic pain more manageable.  4. Improve support system. Actively participate in individual and/or group  psychotherapy routinely.   5. Attain resources due to problems with infertility.        The patient acknowledged and verbally consented to continue with current treatment plan and was educated on the importance of compliance with treatment and follow-up appointments. The patient seems reasonably able to adhere to treatment plan.      SAFETY ISSUES:  The patient adamantly denies suicidal and/or homicidal ideation. She is agreeable to call 911 or go to the nearest Emergency Room should she begin having Suicidal Ideation or Homicidal Ideations.     Follow Up:   Return in about 1 week (around 8/19/2022) for Counseling Session.      Rubi Garduno, PhD, Temp Licensed Psychologist

## 2022-11-28 NOTE — PROGRESS NOTES
Telehealth Visit      Patient Name: Sylvia Douglass  : 1980   MRN: [id number]     Referring Physician: Lisset Velásquez MD    Chief Complaint:  No diagnosis found.     This provider is located at [facility], using a telephone in a secure private environment. The Patient is seen remotely at their home address in [place], using a private telephone.  The patient is unable to be seen through a MyChart Video Visit through [facility] at today's encounter because ***, therefore a telephone encounter was conducted. Patient is being evaluated/treated via telehealth by telephone, and stated they are in a secure environment for this session. The patient's condition being diagnosed/treated is appropriate for telemedicine. The provider identified herself as well as her credentials.   The patient, and/or patient's guardian, consent to be seen remotely, and when consent is given they understand that the consent allows for patient identifiable information to be sent to a third party as needed.   They may refuse to be seen remotely at any time. The electronic data is encrypted and password protected, and the patient and/or guardian has been advised of the potential risks to privacy not withstanding such measures.    You have chosen to receive care through a telephone visit. Do you consent to use a telephone visit for your medical care today? ***  This visit has been rescheduled as a phone visit to comply with patient safety concerns in accordance with CDC recommendations.    History of Present Illness: Sylvia Douglass is a 42 y.o. female who I spoke with on the phone today for ***       Subjective      Review of Systems:   The following portions of the patient's history were reviewed and updates as appropriate: allergies, current medications, past family history, past medical history, past social history, past surgical history and problem list.      Interval History:   {interval history:41117}    Side Effects  ***    Medications:     Current Outpatient Medications:   •  ALPRAZolam (XANAX) 0.5 MG tablet, Take 0.5 mg by mouth., Disp: , Rfl:   •  aluminum chloride (Drysol) 20 % external solution, Drysol 20 % topical solution  APPLY TO AFFECTED AREA START 2X WEEK INCREASE AS TOLERATED TO QHS, Disp: , Rfl:   •  bimatoprost (LATISSE) 0.03 % ophthalmic solution, Latisse 0.03 % eyelash drops  APPLY 1 DROP TO APPLICATOR AND APPLY TO UPPER EYELID, ALONG EYELASHES, BY TOPICAL ROUTE ONCE DAILY AT NIGHTTIME, Disp: , Rfl:   •  BIOTIN PO, biotin, Disp: , Rfl:   •  butalbital-acetaminophen-caffeine (FIORICET, ESGIC) -40 MG per tablet, , Disp: , Rfl:   •  cyclobenzaprine (FLEXERIL) 10 MG tablet, Take 1 tablet by mouth 3 (Three) Times a Day., Disp: 15 tablet, Rfl: 0  •  EPINEPHrine (EPIPEN) 0.3 MG/0.3ML solution auto-injector injection, , Disp: , Rfl:   •  famotidine (PEPCID) 20 MG tablet, , Disp: , Rfl:   •  HYDROcodone-acetaminophen (NORCO) 7.5-325 MG per tablet, , Disp: , Rfl:   •  hydrOXYzine (ATARAX) 25 MG tablet, , Disp: , Rfl:   •  l-methylfolate calcium (DEPLIN) 7.5 MG tablet tablet, Take 7.5 mg by mouth Daily., Disp: , Rfl:   •  Loratadine 10 MG capsule, loratadine, Disp: , Rfl:   •  Nutritional Supplements (VITAMIN D BOOSTER PO), Vitamin D2, Disp: , Rfl:   •  ondansetron (ZOFRAN) 8 MG tablet, , Disp: , Rfl:   •  promethazine (PHENERGAN) 25 MG tablet, Every 4 (Four) Hours., Disp: , Rfl:   •  pseudoephedrine (SUDAFED) 120 MG 12 hr tablet, Every 12 (Twelve) Hours., Disp: , Rfl:   •  rizatriptan (MAXALT) 10 MG tablet, , Disp: , Rfl:     PHQ-9 Depression Screening  {PHQ-9 Score:62263:::1}    Objective     Physical Exam:  Vital Signs:   Due to extenuating circumstances and possible current health risks associated with the patient being present in a clinical setting (with current health restrictions in place in regards to possible COVID 19 transmission/exposure),  the patient was seen remotely today via a telephone visit. Unable to obtain vital signs due to nature of remote visit.  Height stated at *** inches.  Weight stated at *** pounds.     Physical Exam    Mental Status Exam:   Appearance: ***  Hygiene: ***  Cooperation: ***  Eye Contact: ***  Psychomotor Behavior: ***   Mood: ***  Affect: ***  Speech: ***  Thought Process: ***  Thought Content: ***  Suicidal: ***   Homicidal: ***  Hallucinations: ***   Delusion: ***  Memory: ***  Orientation: ***  Reliability: ***  Insight: ***  Judgement: ***  Impulse Control: ***     Quality Measures:   {[facility]_Smoking_Cessation:54865}    I advised Sylvia Douglass of the risks of tobacco use.       Assessment / Plan      Assessment/Plan:   There are no diagnoses linked to this encounter.     TREATMENT PLAN/GOALS: Continue supportive psychotherapy efforts and medications as indicated. Treatment and medication options discussed during today's visit. Patient ackowledged and verbally consented to continue with current treatment plan and was educated on the importance of compliance with treatment and follow-up appointments. Patient seems reasonably able to adhere to treatment plan.      MEDICATION ISSUES:  INSPECT reviewed as expected  Discussed medication options and treatment plan of prescribed medication as well as the risks, benefits, and side effects including potential falls, possible impaired driving and metabolic adversities among others. Patient is agreeable to call the office with any worsening of symptoms or onset of side effects. Patient is agreeable to call 911 or go to the nearest Emergency Room should he/she begin having Suicidal Ideation or Homicidal Ideations. No medication side effects or related complaints today.     Follow Up:   No follow-ups on file.      Rubi Garduno, PhD, Psychologist

## 2022-11-28 NOTE — PROGRESS NOTES
"       Telehealth Visit      Patient Name: Sylvia Douglass  : 1980   MRN: [id number]   Start: 11:20  Stop: 12:06  Referring Physician: Lisset Velásquez MD    Chief Complaint:      ICD-10-CM ICD-9-CM   1. Adjustment disorder with mixed anxiety and depressed mood  F43.23 309.28   2. History of migraine with aura  Z86.69 V12.49   3. Chronic neck and back pain  M54.2 723.1    M54.9 724.5    G89.29 338.29        This provider is located at [facility], using a telephone in a secure private environment. The Patient is seen remotely at their home address in [place], using a private telephone.  The patient is unable to be seen through a MyChart Video Visit through [facility] at today's encounter because of no access, therefore a telephone encounter was conducted. Patient is being evaluated/treated via telehealth by telephone, and stated they are in a secure environment for this session. The patient's condition being diagnosed/treated is appropriate for telemedicine. The provider identified herself as well as her credentials.   The patient, and/or patient's guardian, consent to be seen remotely, and when consent is given they understand that the consent allows for patient identifiable information to be sent to a third party as needed.   They may refuse to be seen remotely at any time. The electronic data is encrypted and password protected, and the patient and/or guardian has been advised of the potential risks to privacy not withstanding such measures.    You have chosen to receive care through a telephone visit. Do you consent to use a telephone visit for your medical care today? YES.    History of Present Illness: Sylvia Douglass is a 42 y.o. female who I spoke with on the phone today for a follow up care navigation appointment. Medical records indicate that she has current diagnoses of an, \"asymptomatic vertex meningioma and neck pain\". She " "was recommended to repeat an MRI of the brain in two years or sooner if clinically indicated. Surgery due to neck pain was not indicated and she was referred for physical therapy and pain management.       Ms. Douglass reports having \"severe anxiety and depression\". Her current psychiatric medications include Xanax and Hydroxyzine. She attends psychotherapy sessions with her therapist, Sulma Carpenter LCSW, of the  clinic and has seen her for several years. More recently, she has consulted with a trauma specialist as recommended. She states she was, \"not deemed to be a good candidate for EMDR because I take Xanax\". She requested ongoing appointments with this provider for added support and psychotherapy.  Subjective      Review of Systems:   The following portions of the patient's history were reviewed and updates as appropriate: allergies, current medications, past family history, past medical history, past social history, past surgical history and problem list.     Interval History:   Sylvia reports that she has been \"doing ok\" but has had significant stress related to finances. She states that her boyfriend is critical of her for not working. She reports that he doesn't understand that she is unable to work due to illness.     Medications:     Current Outpatient Medications:   •  ALPRAZolam (XANAX) 0.5 MG tablet, Take 0.5 mg by mouth., Disp: , Rfl:   •  aluminum chloride (Drysol) 20 % external solution, Drysol 20 % topical solution  APPLY TO AFFECTED AREA START 2X WEEK INCREASE AS TOLERATED TO QHS, Disp: , Rfl:   •  bimatoprost (LATISSE) 0.03 % ophthalmic solution, Latisse 0.03 % eyelash drops  APPLY 1 DROP TO APPLICATOR AND APPLY TO UPPER EYELID, ALONG EYELASHES, BY TOPICAL ROUTE ONCE DAILY AT NIGHTTIME, Disp: , Rfl:   •  BIOTIN PO, biotin, Disp: , Rfl:   •  butalbital-acetaminophen-caffeine (FIORICET, ESGIC) -40 MG per tablet, , Disp: , Rfl:   •  cyclobenzaprine (FLEXERIL) 10 MG tablet, Take 1 tablet by " mouth 3 (Three) Times a Day., Disp: 15 tablet, Rfl: 0  •  EPINEPHrine (EPIPEN) 0.3 MG/0.3ML solution auto-injector injection, , Disp: , Rfl:   •  famotidine (PEPCID) 20 MG tablet, , Disp: , Rfl:   •  HYDROcodone-acetaminophen (NORCO) 7.5-325 MG per tablet, , Disp: , Rfl:   •  hydrOXYzine (ATARAX) 25 MG tablet, , Disp: , Rfl:   •  l-methylfolate calcium (DEPLIN) 7.5 MG tablet tablet, Take 7.5 mg by mouth Daily., Disp: , Rfl:   •  Loratadine 10 MG capsule, loratadine, Disp: , Rfl:   •  Nutritional Supplements (VITAMIN D BOOSTER PO), Vitamin D2, Disp: , Rfl:   •  ondansetron (ZOFRAN) 8 MG tablet, , Disp: , Rfl:   •  promethazine (PHENERGAN) 25 MG tablet, Every 4 (Four) Hours., Disp: , Rfl:   •  pseudoephedrine (SUDAFED) 120 MG 12 hr tablet, Every 12 (Twelve) Hours., Disp: , Rfl:   •  rizatriptan (MAXALT) 10 MG tablet, , Disp: , Rfl:     Objective     Physical Exam:  Vital Signs:   Due to extenuating circumstances and possible current health risks associated with the patient being present in a clinical setting (with current health restrictions in place in regards to possible COVID 19 transmission/exposure), the patient was seen remotely today via a telephone visit.     Physical Exam    Mental Status Exam:   Cooperation: Cooperative  Psychomotor Behavior: Mild agitation   Mood: Sad, Anxious  Affect: Labile  Speech: Tangential   Thought Process: Scattered   Thought Content: Congruent to mood  Suicidal: No SI indicated  Homicidal: No HI indicated  Hallucinations: None reported  Delusion: None reported  Memory: Grossly intact  Orientation: Orientated x4  Reliability: Good  Insight: Fair  Judgement: Fair  Impulse Control: Impulsive     Quality Measures:   Never smoker    Assessment / Plan      Assessment/Plan:   Diagnoses and all orders for this visit:    1. Adjustment disorder with mixed anxiety and depressed mood (Primary)    2. History of migraine with aura    3. Chronic neck and back pain    Empathic listening was used  to explore factors related to unemployment. This included the patient's perceived sources of stress, level of distress and disability, and adaptive and maladaptive coping actions. Her current financial stress appeared to be exacerbated by the conflict with her boyfriend. She reports a high level of perceived stress and offered more insight into her feelings of inadequacy, fear, and failure. Her ability to cope appears poor given her low self-esteem and lack of emotional support. Past traumatic events appear somewhat influential     Today's session focused on teaching the patient more about the connection between thoughts, feelings, and behavior. Collaboratively discussed how all thoughts are valid, but not necessarily true. Offered an option for the patient to utilize thought challenging as a way to stabilize mood an improve conflict-resolution skills. Reaffirmed her ability to cope more effectively and how the development of more realistic, healthy cognitive messages are imperative to relieving her anxiety and depression. The patient agreed to be more mindful of specific thoughts and feelings that are recurrent and serve as cognitive sources of distress.     TREATMENT PLAN/GOALS:   1. Stabilize mood. Consult with prescribing provider about medication options for psychiatric symptoms. Ameliorate or decrease the frequency and severity of depression, anxiety, and panic by attending routine cognitive behavior therapy.  2. Verbalize healthy, realistic cognitive messages that promote harmony with others, self-acceptance, and confidence.   3. Maintain scheduled appointments with treatment providers, including therapists, primary care providers, specialists, etc.  4. Implement relaxation strategies in her daily routine to combat stress and make chronic pain more manageable.  5. Improve support system. Actively participate in individual and/or group psychotherapy routinely.   6. Attain resources due to problems with  infertility.        The patient acknowledged and verbally consented to continue with current treatment plan and was educated on the importance of compliance with treatment and follow-up appointments. The patient seems reasonably able to adhere to treatment plan.      SAFETY ISSUES:  The patient adamantly denies suicidal and/or homicidal ideation. She is agreeable to call 911 or go to the nearest Emergency Room should she begin having Suicidal Ideation or Homicidal Ideations.     Follow Up:   Return in about 1 week (around 8/26/2022) for Counseling Session.      Rubi Garduno, PhD, Temp Licensed Psychologist

## 2022-12-08 ENCOUNTER — OFFICE VISIT (OUTPATIENT)
Dept: PSYCHIATRY | Facility: CLINIC | Age: 42
End: 2022-12-08
Payer: MEDICAID

## 2022-12-08 DIAGNOSIS — M54.9 CHRONIC NECK AND BACK PAIN: ICD-10-CM

## 2022-12-08 DIAGNOSIS — M54.2 CHRONIC NECK AND BACK PAIN: ICD-10-CM

## 2022-12-08 DIAGNOSIS — F43.9 TRAUMA AND STRESSOR-RELATED DISORDER: ICD-10-CM

## 2022-12-08 DIAGNOSIS — F43.23 ADJUSTMENT DISORDER WITH MIXED ANXIETY AND DEPRESSED MOOD: Primary | ICD-10-CM

## 2022-12-08 DIAGNOSIS — G89.29 CHRONIC NECK AND BACK PAIN: ICD-10-CM

## 2022-12-08 PROCEDURE — 90834 PSYTX W PT 45 MINUTES: CPT | Performed by: STUDENT IN AN ORGANIZED HEALTH CARE EDUCATION/TRAINING PROGRAM

## 2022-12-15 ENCOUNTER — OFFICE VISIT (OUTPATIENT)
Dept: PSYCHIATRY | Facility: CLINIC | Age: 42
End: 2022-12-15
Payer: MEDICAID

## 2022-12-15 DIAGNOSIS — M54.9 CHRONIC NECK AND BACK PAIN: ICD-10-CM

## 2022-12-15 DIAGNOSIS — Z86.69 HISTORY OF MIGRAINE WITH AURA: ICD-10-CM

## 2022-12-15 DIAGNOSIS — F43.9 TRAUMA AND STRESSOR-RELATED DISORDER: ICD-10-CM

## 2022-12-15 DIAGNOSIS — G89.29 CHRONIC NECK AND BACK PAIN: ICD-10-CM

## 2022-12-15 DIAGNOSIS — M54.2 CHRONIC NECK AND BACK PAIN: ICD-10-CM

## 2022-12-15 DIAGNOSIS — F43.23 ADJUSTMENT DISORDER WITH MIXED ANXIETY AND DEPRESSED MOOD: Primary | ICD-10-CM

## 2022-12-15 DIAGNOSIS — Z62.819 HISTORY OF ABUSE IN CHILDHOOD: ICD-10-CM

## 2022-12-16 ENCOUNTER — OFFICE VISIT (OUTPATIENT)
Dept: PSYCHIATRY | Facility: CLINIC | Age: 42
End: 2022-12-16
Payer: MEDICAID

## 2022-12-16 DIAGNOSIS — G89.29 CHRONIC NECK AND BACK PAIN: ICD-10-CM

## 2022-12-16 DIAGNOSIS — M54.9 CHRONIC NECK AND BACK PAIN: ICD-10-CM

## 2022-12-16 DIAGNOSIS — F43.23 ADJUSTMENT DISORDER WITH MIXED ANXIETY AND DEPRESSED MOOD: Primary | ICD-10-CM

## 2022-12-16 DIAGNOSIS — F43.9 TRAUMA AND STRESSOR-RELATED DISORDER: ICD-10-CM

## 2022-12-16 DIAGNOSIS — M54.2 CHRONIC NECK AND BACK PAIN: ICD-10-CM

## 2022-12-16 PROCEDURE — 90834 PSYTX W PT 45 MINUTES: CPT | Performed by: STUDENT IN AN ORGANIZED HEALTH CARE EDUCATION/TRAINING PROGRAM

## 2022-12-23 ENCOUNTER — OFFICE VISIT (OUTPATIENT)
Dept: PSYCHIATRY | Facility: CLINIC | Age: 42
End: 2022-12-23
Payer: MEDICAID

## 2022-12-23 DIAGNOSIS — M54.2 CHRONIC NECK AND BACK PAIN: ICD-10-CM

## 2022-12-23 DIAGNOSIS — M54.9 CHRONIC NECK AND BACK PAIN: ICD-10-CM

## 2022-12-23 DIAGNOSIS — G89.29 CHRONIC NECK AND BACK PAIN: ICD-10-CM

## 2022-12-23 DIAGNOSIS — F43.9 TRAUMA AND STRESSOR-RELATED DISORDER: ICD-10-CM

## 2022-12-23 DIAGNOSIS — F43.23 ADJUSTMENT DISORDER WITH MIXED ANXIETY AND DEPRESSED MOOD: Primary | ICD-10-CM

## 2022-12-23 PROCEDURE — 90834 PSYTX W PT 45 MINUTES: CPT | Performed by: STUDENT IN AN ORGANIZED HEALTH CARE EDUCATION/TRAINING PROGRAM

## 2023-01-03 ENCOUNTER — TELEPHONE (OUTPATIENT)
Dept: OBSTETRICS AND GYNECOLOGY | Facility: CLINIC | Age: 43
End: 2023-01-03

## 2023-01-03 NOTE — TELEPHONE ENCOUNTER
Caller: Sylvia Douglass    Relationship to patient: Self    Best call back number: [phone]    Chief complaint: ENDOMETRIOSIS     Type of visit: NEW GYN    Requested date: AS SOON AS POSSIBLE    If rescheduling, when is the original appointment: 02-@11AM    PT STATES SHE HAS ANXIETY AND DEPRESSION AND SHE WOULD LIKE TO BE SEEN AS SOON AS POSSIBLE TO ESTABLISH CARE.  PT STATES SHE HAS ENDO AND IS TRYING TO CONCEIVE.  PT ALSO STATES SHE IS GOING TO PAIN MANAGEMENT.  CAN CALL BACK AFTER 9AM AND CAN LVM.  THANK YOU!

## 2023-01-06 ENCOUNTER — OFFICE VISIT (OUTPATIENT)
Dept: PSYCHIATRY | Facility: CLINIC | Age: 43
End: 2023-01-06
Payer: MEDICAID

## 2023-01-06 DIAGNOSIS — Z62.819 HISTORY OF ABUSE IN CHILDHOOD: ICD-10-CM

## 2023-01-06 DIAGNOSIS — F43.23 ADJUSTMENT DISORDER WITH MIXED ANXIETY AND DEPRESSED MOOD: Primary | ICD-10-CM

## 2023-01-06 DIAGNOSIS — F16.283 FLASHBACKS: ICD-10-CM

## 2023-01-06 DIAGNOSIS — G89.29 CHRONIC NECK AND BACK PAIN: ICD-10-CM

## 2023-01-06 DIAGNOSIS — M54.9 CHRONIC NECK AND BACK PAIN: ICD-10-CM

## 2023-01-06 DIAGNOSIS — M54.2 CHRONIC NECK AND BACK PAIN: ICD-10-CM

## 2023-01-06 DIAGNOSIS — F43.9 TRAUMA AND STRESSOR-RELATED DISORDER: ICD-10-CM

## 2023-01-06 PROCEDURE — 90837 PSYTX W PT 60 MINUTES: CPT | Performed by: STUDENT IN AN ORGANIZED HEALTH CARE EDUCATION/TRAINING PROGRAM

## 2023-01-12 NOTE — PROGRESS NOTES
Telehealth Visit      Patient Name: Sylvia Douglass  : 1980   MRN: [id number]   Start: 9:14  Stop: 9:59  Referring Physician: Lisset Velásquez MD    Chief Complaint:      ICD-10-CM ICD-9-CM   1. Adjustment disorder with mixed anxiety and depressed mood  F43.23 309.28   2. History of migraine with aura  Z86.69 V12.49   3. Chronic neck and back pain  M54.2 723.1    M54.9 724.5    G89.29 338.29   4. Trauma and stressor-related disorder  F43.9 309.81     308.9        This provider is located at [facility], using a telephone in a secure private environment. The Patient is seen remotely at their home address in [place], using a private telephone.  The patient is unable to be seen through a MyChart Video Visit through [facility] at today's encounter because of no access, therefore a telephone encounter was conducted. Patient is being evaluated/treated via telehealth by telephone, and stated they are in a secure environment for this session. The patient's condition being diagnosed/treated is appropriate for telemedicine. The provider identified herself as well as her credentials.   The patient, and/or patient's guardian, consent to be seen remotely, and when consent is given they understand that the consent allows for patient identifiable information to be sent to a third party as needed.   They may refuse to be seen remotely at any time. The electronic data is encrypted and password protected, and the patient and/or guardian has been advised of the potential risks to privacy not withstanding such measures.    You have chosen to receive care through a telephone visit. Do you consent to use a telephone visit for your medical care today? YES.    History of Present Illness: Sylvia Douglass is a 42 y.o. female who I spoke with on the phone today for a follow up care navigation and supportive psychotherapy appointment. Ms. Douglass has current  "diagnoses of an, \"asymptomatic vertex meningioma and neck pain\". She was recommended to repeat an MRI of the brain in two years or sooner if clinically indicated. Surgery due to neck pain was not indicated and she was referred for physical therapy and pain management.       Ms. Douglass reports having \"severe anxiety and depression\". Her current psychiatric medications include Xanax and Hydroxyzine. Past treatment providers include Sulma Carpenter LCSW, of the  clinic for several years. She states she was, \"not deemed to be a good candidate for EMDR because I take Xanax\". She also attends counseling with BAILEY Santos to learn more effective ways to communicate and resolve conflict with her partner.   Subjective      Review of Systems:   The following portions of the patient's history were reviewed and updates as appropriate: allergies, current medications, past family history, past medical history, past social history, past surgical history and problem list.     Interval History:   Sylvia reported having more problems with headache pain since her last session. She states that chronic migraines and a hormone imbalance contribute to her emotional lability.     Medications:     Current Outpatient Medications:   •  ALPRAZolam (XANAX) 0.5 MG tablet, Take 0.5 mg by mouth., Disp: , Rfl:   •  aluminum chloride (Drysol) 20 % external solution, Drysol 20 % topical solution  APPLY TO AFFECTED AREA START 2X WEEK INCREASE AS TOLERATED TO QHS, Disp: , Rfl:   •  bimatoprost (LATISSE) 0.03 % ophthalmic solution, Latisse 0.03 % eyelash drops  APPLY 1 DROP TO APPLICATOR AND APPLY TO UPPER EYELID, ALONG EYELASHES, BY TOPICAL ROUTE ONCE DAILY AT NIGHTTIME, Disp: , Rfl:   •  BIOTIN PO, biotin, Disp: , Rfl:   •  butalbital-acetaminophen-caffeine (FIORICET, ESGIC) -40 MG per tablet, , Disp: , Rfl:   •  cyclobenzaprine (FLEXERIL) 10 MG tablet, Take 1 tablet by mouth 3 (Three) Times a Day., Disp: 15 tablet, Rfl: 0  •  " EPINEPHrine (EPIPEN) 0.3 MG/0.3ML solution auto-injector injection, , Disp: , Rfl:   •  famotidine (PEPCID) 20 MG tablet, , Disp: , Rfl:   •  HYDROcodone-acetaminophen (NORCO) 7.5-325 MG per tablet, , Disp: , Rfl:   •  hydrOXYzine (ATARAX) 25 MG tablet, , Disp: , Rfl:   •  l-methylfolate calcium (DEPLIN) 7.5 MG tablet tablet, Take 7.5 mg by mouth Daily., Disp: , Rfl:   •  Loratadine 10 MG capsule, loratadine, Disp: , Rfl:   •  Nutritional Supplements (VITAMIN D BOOSTER PO), Vitamin D2, Disp: , Rfl:   •  ondansetron (ZOFRAN) 8 MG tablet, , Disp: , Rfl:   •  promethazine (PHENERGAN) 25 MG tablet, Every 4 (Four) Hours., Disp: , Rfl:   •  pseudoephedrine (SUDAFED) 120 MG 12 hr tablet, Every 12 (Twelve) Hours., Disp: , Rfl:   •  rizatriptan (MAXALT) 10 MG tablet, , Disp: , Rfl:     Objective     Physical Exam:  Vital Signs:   Due to extenuating circumstances and possible current health risks associated with the patient being present in a clinical setting (with current health restrictions in place in regards to possible COVID 19 transmission/exposure), the patient was seen remotely today via a telephone visit.     Physical Exam    Mental Status Exam:   Cooperation: Cooperative  Psychomotor Behavior: Unremarkable   Mood: Dysthymic  Affect: Appropriate  Speech: Clear  Thought Process: Goal directed  Thought Content: Logical  Suicidal: No SI indicated  Homicidal: No HI indicated  Hallucinations: None  Delusion: None  Memory: Grossly intact  Orientation: Orietnated x4  Reliability: Good  Insight: Fair  Judgement: Fair   Impulse Control: Influenced by pain and mood    Quality Measures:   Never smoker     Sylvia Douglass appears aware of the risks of tobacco use.     Assessment / Plan      Assessment/Plan:   Diagnoses and all orders for this visit:    1. Adjustment disorder with mixed anxiety and depressed mood (Primary)    2. History of migraine with aura    3. Chronic neck and back pain    4. Trauma and  stressor-related disorder    Empathic listening was used to assist the patient with identifying, sorting through, and verbalizing the various feelings generated by her medical conditions. She verbalized an acceptance of her need for long-term treatment due to her history of physical and mental health conditions.     The patient reports attending a counseling session with her [facility] counselor on Tuesday. She states no concerns related to the therapeutic relationship and reports that she is actively participating in Dialectical Behavior Therapy to decrease relationship distress. She has been cooperative with her recommended treatment regimen and has agreed to continue therapy without passive-aggressive or active resistance.     Collaboratively identified biopsychosocial factors influential on the frequency and intensity of the patient's chronic pain/migraines. She offered more insight into related triggers including sleep deprivation or a poor quality of sleep, certain foods, being on her menstrual cycle, and stress. Current effective treatment methods include medication management of her pain, using a back/neck massager,  increased awareness of posture and body mechanics, and modified sleep habits to cope with chronic neck and low back pain. Reiterated the importance of acceptance, mindfulness, relaxation training, deep breathing, and other holistic approaches to managing her chronic pain.     TREATMENT PLAN/GOALS:   1. Stabilize mood. Ameliorate or decrease the frequency and severity of depression, anxiety, and panic by attending routine cognitive behavior therapy.  2. Verbalize healthy, realistic cognitive messages that promote harmony with others, self-acceptance, and confidence.   3. Maintain scheduled appointments with treatment providers, including therapists, primary care providers, specialists, etc.  4. Implement relaxation strategies in her daily routine to combat stress and make chronic pain more  manageable.  5. Improve support system. Actively participate in individual and/or group psychotherapy routinely.   6. Attain added resources due to problems with infertility.       The patient reports being motivated to achieve her short-term goal of taking oral exams by the end of September, as needed to earn her degree. Reaffirmed her ability to achieve this goal by utilizing her personal strengths (e.g., self-motivated, intelligent, timing, etc.). Reiterated the benefits of doing so in order to achieve long-term vocational goals.     The patient acknowledged and verbally consented to continue with current treatment plan. She seems reasonably able to adhere to treatment plan.      SAFETY ISSUES:  The patient adamantly denies suicidal and/or homicidal ideation. She is agreeable to call 911 or go to the nearest Emergency Room should she begin having Suicidal Ideation or Homicidal Ideations.      Follow Up:   Return in about 1 week (around 9/16/2022) for Counseling Session.      Rubi Garduno, PhD, Temp Licensed Psychologist

## 2023-01-12 NOTE — PROGRESS NOTES
:     Telehealth Visit      Patient Name: Sylvia Douglass  : 1980   MRN: [id number]   Start:  9:07  Stop: 9:53   Referring Physician: Lisset Velásquez MD    Chief Complaint:      ICD-10-CM ICD-9-CM   1. Adjustment disorder with mixed anxiety and depressed mood  F43.23 309.28   2. History of migraine with aura  Z86.69 V12.49   3. Chronic neck and back pain  M54.2 723.1    M54.9 724.5    G89.29 338.29   4. Trauma and stressor-related disorder  F43.9 309.81     308.9        This provider is located at [facility], using a telephone in a secure private environment. The Patient is seen remotely at their home address in [place], using a private telephone.  The patient is unable to be seen through a MyChart Video Visit through [facility] at today's encounter because of no access, therefore a telephone encounter was conducted. Patient is being evaluated/treated via telehealth by telephone, and stated they are in a secure environment for this session. The patient's condition being diagnosed/treated is appropriate for telemedicine. The provider identified herself as well as her credentials.   The patient, and/or patient's guardian, consent to be seen remotely, and when consent is given they understand that the consent allows for patient identifiable information to be sent to a third party as needed.   They may refuse to be seen remotely at any time. The electronic data is encrypted and password protected, and the patient and/or guardian has been advised of the potential risks to privacy not withstanding such measures.    You have chosen to receive care through a telephone visit. Do you consent to use a telephone visit for your medical care today? YES.    History of Present Illness: Sylvia Douglass is a 42 y.o. female who I spoke with on the phone today for a follow up care navigation and supportive psychotherapy appointment. Ms. Douglass has current  "diagnoses of an, \"asymptomatic vertex meningioma and neck pain\". She was recommended to repeat an MRI of the brain in two years or sooner if clinically indicated. Surgery due to neck pain was not indicated and she was referred for physical therapy and pain management.       Ms. Douglass reports having \"severe anxiety and depression\". Her current psychiatric medications include Xanax and Hydroxyzine. Past treatment providers include Sulma Carpenter LCSW, of the [facility] for several years. She states she was, \"not deemed to be a good candidate for EMDR because I take Xanax\". She also attends counseling with BAILEY Santos to learn more effective ways to communicate and resolve conflict with her partner.     Subjective      Review of Systems:   The following portions of the patient's history were reviewed and updates as appropriate: allergies, current medications, past family history, past medical history, past social history, past surgical history and problem list.     Interval History:   Sylvia reports less stress stress related to her relationship. She states she has been working on mindfulness and \"keeping myself in check\". She acknowledged that for her, \"a small leak can turn into a huge flood\". She notes the related benefits of having improved self-awareness of \"what is going on\" and improved self-control by resisting a past habit to respond over-emotionally.     Medications:     Current Outpatient Medications:   •  ALPRAZolam (XANAX) 0.5 MG tablet, Take 0.5 mg by mouth., Disp: , Rfl:   •  aluminum chloride (Drysol) 20 % external solution, Drysol 20 % topical solution  APPLY TO AFFECTED AREA START 2X WEEK INCREASE AS TOLERATED TO QHS, Disp: , Rfl:   •  bimatoprost (LATISSE) 0.03 % ophthalmic solution, Latisse 0.03 % eyelash drops  APPLY 1 DROP TO APPLICATOR AND APPLY TO UPPER EYELID, ALONG EYELASHES, BY TOPICAL ROUTE ONCE DAILY AT NIGHTTIME, Disp: , Rfl:   •  BIOTIN PO, biotin, Disp: , Rfl:   •  " butalbital-acetaminophen-caffeine (FIORICET, ESGIC) -40 MG per tablet, , Disp: , Rfl:   •  cyclobenzaprine (FLEXERIL) 10 MG tablet, Take 1 tablet by mouth 3 (Three) Times a Day., Disp: 15 tablet, Rfl: 0  •  EPINEPHrine (EPIPEN) 0.3 MG/0.3ML solution auto-injector injection, , Disp: , Rfl:   •  famotidine (PEPCID) 20 MG tablet, , Disp: , Rfl:   •  HYDROcodone-acetaminophen (NORCO) 7.5-325 MG per tablet, , Disp: , Rfl:   •  hydrOXYzine (ATARAX) 25 MG tablet, , Disp: , Rfl:   •  l-methylfolate calcium (DEPLIN) 7.5 MG tablet tablet, Take 7.5 mg by mouth Daily., Disp: , Rfl:   •  Loratadine 10 MG capsule, loratadine, Disp: , Rfl:   •  Nutritional Supplements (VITAMIN D BOOSTER PO), Vitamin D2, Disp: , Rfl:   •  ondansetron (ZOFRAN) 8 MG tablet, , Disp: , Rfl:   •  promethazine (PHENERGAN) 25 MG tablet, Every 4 (Four) Hours., Disp: , Rfl:   •  pseudoephedrine (SUDAFED) 120 MG 12 hr tablet, Every 12 (Twelve) Hours., Disp: , Rfl:   •  rizatriptan (MAXALT) 10 MG tablet, , Disp: , Rfl:     Objective     Physical Exam:  Vital Signs:   Due to extenuating circumstances and possible current health risks associated with the patient being present in a clinical setting (with current health restrictions in place in regards to possible COVID 19 transmission/exposure), the patient was seen remotely today via a telephone visit.    Physical Exam    Mental Status Exam:   Cooperation: Cooperative  Psychomotor Behavior: Unremarkable   Mood: Neutral   Affect: Appropriate  Speech: Intelligible  Thought Process: Goal directed  Thought Content: Normal  Suicidal: No SI indicated  Homicidal: No HI indicated  Hallucinations: None   Delusion: None  Memory: Grossly intact  Orientation: Orientated x4  Reliability: Good  Insight: Fair  Judgement: Fair to Good  Impulse Control: Less impulsive     Quality Measures:   Never smoker    Assessment / Plan      Assessment/Plan:   Diagnoses and all orders for this visit:    1. Adjustment disorder with  "mixed anxiety and depressed mood (Primary)    2. History of migraine with aura    3. Chronic neck and back pain    4. Trauma and stressor-related disorder    Checked in with the patient regarding her perceived progress with treatment goals. Regarding he intimate relationship, she reports some improvement with emotional awareness, conflict-resolution, and self-control. She reports that her boyfriend continues to be critical of her for not working. Challenged thoughts of helplessness/hopelessness by reiterating her goal of earning a masters degree in communication to attain a better paying job. Praised the patient for her added effort with studying and challenged related anxiety by identifying the worst possible outcome (e.g., retaking the test).     Due to chronic migraines and other health conditions, the patient reports a plan to test her home for mold. Explored potential solutions (e.g., mold removal versus moving) and discussed the effects that other environmental factors can have on physical and mental health.     Empathic listening was used to identify how past experiences with medical treatments have resulted with increased fear and anxiety. For example, she states she,\"woke up during my EGD after something scared me\". She also reports having a bad experience with a nurse after, \"I had a freak out due to pain after surgery\". Analyzed how past traumatic experiences can influence her ability to cope effectively without addressing related fears.       TREATMENT PLAN/GOALS:   1. Maintain stable mood. Ameliorate or decrease the frequency and severity of depression, anxiety, and panic by attending routine cognitive behavior therapy.  2. Verbalize healthy, realistic cognitive messages that promote harmony with others, self-acceptance, and confidence.   3. Maintain scheduled appointments with treatment providers, including therapists, primary care providers, specialists, etc.  4. Implement relaxation strategies in her " daily routine to combat stress and make chronic pain more manageable.  5. Improve support system. Actively participate in individual and/or group psychotherapy routinely.   6. Attain added resources due to problems with infertility.        The patient reports being motivated to achieve her short-term goal of taking oral exams by the end of September, as needed to earn her degree. She states she scheduled her final exams for the 28th, 29th, and 30th of September.     The patient acknowledged and verbally consented to continue with current treatment plan. She seems reasonably able to adhere to treatment plan.     Follow Up:   Return in about 1 week (around 9/26/2022) for Counseling Session.      Rubi Garduno, PhD, Temp Licensed Psychologist

## 2023-01-12 NOTE — PROGRESS NOTES
Telehealth Visit      Patient Name: Sylvia Douglass  : 1980   MRN: [id number]   Start: 9:10  Stop: 10:01  Referring Physician: Lisset Velásquez MD    Chief Complaint:      ICD-10-CM ICD-9-CM   1. Adjustment disorder with mixed anxiety and depressed mood  F43.23 309.28   2. History of migraine with aura  Z86.69 V12.49   3. Chronic neck and back pain  M54.2 723.1    M54.9 724.5    G89.29 338.29   4. Trauma and stressor-related disorder  F43.9 309.81     308.9        This provider is located at [facility], using a telephone in a secure private environment. The Patient is seen remotely at their home address in [place], using a private telephone.  The patient is unable to be seen through a MyChart Video Visit through [facility] at today's encounter because of no access, therefore a telephone encounter was conducted. Patient is being evaluated/treated via telehealth by telephone, and stated they are in a secure environment for this session. The patient's condition being diagnosed/treated is appropriate for telemedicine. The provider identified herself as well as her credentials.   The patient, and/or patient's guardian, consent to be seen remotely, and when consent is given they understand that the consent allows for patient identifiable information to be sent to a third party as needed.   They may refuse to be seen remotely at any time. The electronic data is encrypted and password protected, and the patient and/or guardian has been advised of the potential risks to privacy not withstanding such measures.    You have chosen to receive care through a telephone visit. Do you consent to use a telephone visit for your medical care today? YES.    History of Present Illness: Sylvia Douglass is a 42 y.o. female who I spoke with on the phone today for a follow up care navigation and supportive psychotherapy appointment. Medical records reviewed  "indicate that she has current diagnoses of an, \"asymptomatic vertex meningioma and neck pain\". She was recommended to repeat an MRI of the brain in two years or sooner if clinically indicated. Surgery due to neck pain was not indicated and she was referred for physical therapy and pain management.       Ms. Douglass reports having \"severe anxiety and depression\". Her current psychiatric medications include Xanax and Hydroxyzine. Past treatment providers include Sulma Carpenter LCSW, of the [facility] for several years. She states she was, \"not deemed to be a good candidate for EMDR because I take Xanax\".   Subjective      Review of Systems:   The following portions of the patient's history were reviewed and updates as appropriate: allergies, current medications, past family history, past medical history, past social history, past surgical history and problem list.     Interval History:   Sylvia notes that she has been less anxious with driving but states, \"I've been feeling more depressed this week\". She reports having negative thoughts related to difficulties with infertility and was tearful when commenting that her boyfriend, \"doesn't want to have children\".     Medications:     Current Outpatient Medications:   •  ALPRAZolam (XANAX) 0.5 MG tablet, Take 0.5 mg by mouth., Disp: , Rfl:   •  aluminum chloride (Drysol) 20 % external solution, Drysol 20 % topical solution  APPLY TO AFFECTED AREA START 2X WEEK INCREASE AS TOLERATED TO QHS, Disp: , Rfl:   •  bimatoprost (LATISSE) 0.03 % ophthalmic solution, Latisse 0.03 % eyelash drops  APPLY 1 DROP TO APPLICATOR AND APPLY TO UPPER EYELID, ALONG EYELASHES, BY TOPICAL ROUTE ONCE DAILY AT NIGHTTIME, Disp: , Rfl:   •  BIOTIN PO, biotin, Disp: , Rfl:   •  butalbital-acetaminophen-caffeine (FIORICET, ESGIC) -40 MG per tablet, , Disp: , Rfl:   •  cyclobenzaprine (FLEXERIL) 10 MG tablet, Take 1 tablet by mouth 3 (Three) Times a Day., Disp: 15 tablet, Rfl: 0  •  EPINEPHrine " (EPIPEN) 0.3 MG/0.3ML solution auto-injector injection, , Disp: , Rfl:   •  famotidine (PEPCID) 20 MG tablet, , Disp: , Rfl:   •  HYDROcodone-acetaminophen (NORCO) 7.5-325 MG per tablet, , Disp: , Rfl:   •  hydrOXYzine (ATARAX) 25 MG tablet, , Disp: , Rfl:   •  l-methylfolate calcium (DEPLIN) 7.5 MG tablet tablet, Take 7.5 mg by mouth Daily., Disp: , Rfl:   •  Loratadine 10 MG capsule, loratadine, Disp: , Rfl:   •  Nutritional Supplements (VITAMIN D BOOSTER PO), Vitamin D2, Disp: , Rfl:   •  ondansetron (ZOFRAN) 8 MG tablet, , Disp: , Rfl:   •  promethazine (PHENERGAN) 25 MG tablet, Every 4 (Four) Hours., Disp: , Rfl:   •  pseudoephedrine (SUDAFED) 120 MG 12 hr tablet, Every 12 (Twelve) Hours., Disp: , Rfl:   •  rizatriptan (MAXALT) 10 MG tablet, , Disp: , Rfl:     Objective     Physical Exam:  Vital Signs:   Due to extenuating circumstances and possible current health risks associated with the patient being present in a clinical setting (with current health restrictions in place in regards to possible COVID 19 transmission/exposure), the patient was seen remotely today via a telephone visit.     Physical Exam    Mental Status Exam:  Cooperation: Cooperative  Psychomotor Behavior: Unremarkable   Mood: Depressed  Affect: Appropriate  Speech: Coherent  Thought Process: Self-defeating  Thought Content: Congruent to mood  Suicidal: Denies SI  Homicidal: No HI indicated  Hallucinations: None  Delusion: None  Memory: Grossly intact  Orientation: Orientated x4  Reliability: Good  Insight: Fair  Judgement: Fair  Impulse Control: Influenced by mood     Quality Measures:   Never smoker     Sylvia Douglass appears aware of the risks of tobacco use.     Assessment / Plan      Assessment/Plan:   Diagnoses and all orders for this visit:    1. Adjustment disorder with mixed anxiety and depressed mood (Primary)    2. History of migraine with aura    3. Chronic neck and back pain    4. Trauma and stressor-related  "disorder    The patient reports increased depression related to thoughts that she \"will never be able to have a baby\". Normalized the patient's  thoughts given her age, pregnancy history, birth control use, boyfriend's choice, etc., but also offered an alternative view that she may be able to conceive with support (e.g., in vitro, medication, different partner, etc.). She states she has making an effort to call a fertility clinic this past week to explore her options. However, current challenges appear related to a lack of financial resources, no insurance coverage for specialist, and conflict/differing beliefs in her relationship.     The patient offered some insight into a habit of all-or-nothing thinking - a cognitive distortion characterized by viewing the world from two extremes. Either everything is perfect, or it’s the end of the world. She also acknowledges responding to distress with a high level of emotion. She states, \"my emotions come first and my brain is ten steps behind\". Analyzed how doing so is problematic, particularly with her relationship.     The patient reports having a recent consultation session with a relationship  counselor, BAILEY Santos. She agreed to continue regular sessions with Ms. Yang to improve more effective ways to communicate and resolve conflict with her significant other. She requested to continue weekly care navigation supportive psychotherapy with this provider via telehealth.     TREATMENT PLAN/GOALS:   1. Stabilize mood. Ameliorate or decrease the frequency and severity of depression, anxiety, and panic by attending routine cognitive behavior therapy.  2. Verbalize healthy, realistic cognitive messages that promote harmony with others, self-acceptance, and confidence.   3. Maintain scheduled appointments with treatment providers, including therapists, primary care providers, specialists, etc.  4. Implement relaxation strategies in her daily routine to combat stress " and make chronic pain more manageable.  5. Improve support system. Actively participate in individual and/or group psychotherapy routinely.   6. Attain added resources due to problems with infertility.        The patient acknowledged and verbally consented to continue with current treatment plan. She seems reasonably able to adhere to treatment plan.      SAFETY ISSUES:  The patient adamantly denies suicidal and/or homicidal ideation. She is agreeable to call 911 or go to the nearest Emergency Room should she begin having Suicidal Ideation or Homicidal Ideations.      Follow Up:   Return in about 1 week (around 9/9/2022) for Counseling Session.      Rubi Garduno, PhD, Temp Licensed Psychologist

## 2023-01-16 NOTE — PROGRESS NOTES
Telehealth Visit      Patient Name: Sylvia Douglass  : 1980   MRN: [id number]   Start: 10:10  Stop: 11:04  Referring Physician: Lisset Velásquez MD    Chief Complaint:      ICD-10-CM ICD-9-CM   1. Adjustment disorder with mixed anxiety and depressed mood  F43.23 309.28   2. History of migraine with aura  Z86.69 V12.49   3. Chronic neck and back pain  M54.2 723.1    M54.9 724.5    G89.29 338.29   4. Trauma and stressor-related disorder  F43.9 309.81     308.9        This provider is located at [facility], using a telephone in a secure private environment. The Patient is seen remotely at their home address in [place], using a private telephone.  The patient is unable to be seen through a MyChart Video Visit through [facility] at today's encounter because of no access, therefore a telephone encounter was conducted. Patient is being evaluated/treated via telehealth by telephone, and stated they are in a secure environment for this session. The patient's condition being diagnosed/treated is appropriate for telemedicine. The provider identified herself as well as her credentials.   The patient, and/or patient's guardian, consent to be seen remotely, and when consent is given they understand that the consent allows for patient identifiable information to be sent to a third party as needed.   They may refuse to be seen remotely at any time. The electronic data is encrypted and password protected, and the patient and/or guardian has been advised of the potential risks to privacy not withstanding such measures.    You have chosen to receive care through a telephone visit. Do you consent to use a telephone visit for your medical care today? YES.    History of Present Illness: Sylvia Douglass is a 42 y.o. female who I spoke with on the phone today for a follow up care navigation and supportive psychotherapy appointment. Ms. Douglass has current  "diagnoses of an, \"asymptomatic vertex  meningioma and neck pain\". She was recommended to repeat an MRI of the brain in two years or sooner if clinically indicated. Surgery due to neck pain was not indicated and she was referred for physical therapy and pain management.       Ms. Douglass reports having \"severe anxiety and depression\".  Her current psychiatric medications include Xanax and Hydroxyzine. Past treatment providers include Sulma Carpenter LCSW, of the  clinic for several years. She states she was, \"not deemed to be a good candidate for EMDR because I take Xanax\". She also attends counseling with BAILEY Santos to learn more effective ways to communicate and resolve conflict with her partner.    Subjective      Review of Systems:   The following portions of the patient's history were reviewed and updates as appropriate: allergies, current medications, past family history, past medical history, past social history, past surgical history and problem list.     Interval History:   Sylvia reports studying this past week for her boards, as needed to attain her degree. She states she had been doing well until yesterday. She reports increased problems with anxiety and that, \"my body started going back into survival mode\".    Medications:     Current Outpatient Medications:   •  ALPRAZolam (XANAX) 0.5 MG tablet, Take 0.5 mg by mouth., Disp: , Rfl:   •  aluminum chloride (Drysol) 20 % external solution, Drysol 20 % topical solution  APPLY TO AFFECTED AREA START 2X WEEK INCREASE AS TOLERATED TO QHS, Disp: , Rfl:   •  bimatoprost (LATISSE) 0.03 % ophthalmic solution, Latisse 0.03 % eyelash drops  APPLY 1 DROP TO APPLICATOR AND APPLY TO UPPER EYELID, ALONG EYELASHES, BY TOPICAL ROUTE ONCE DAILY AT NIGHTTIME, Disp: , Rfl:   •  BIOTIN PO, biotin, Disp: , Rfl:   •  butalbital-acetaminophen-caffeine (FIORICET, ESGIC) -40 MG per tablet, , Disp: , Rfl:   •  cyclobenzaprine (FLEXERIL) 10 MG tablet, Take 1 tablet " by mouth 3 (Three) Times a Day., Disp: 15 tablet, Rfl: 0  •  EPINEPHrine (EPIPEN) 0.3 MG/0.3ML solution auto-injector injection, , Disp: , Rfl:   •  famotidine (PEPCID) 20 MG tablet, , Disp: , Rfl:   •  HYDROcodone-acetaminophen (NORCO) 7.5-325 MG per tablet, , Disp: , Rfl:   •  hydrOXYzine (ATARAX) 25 MG tablet, , Disp: , Rfl:   •  l-methylfolate calcium (DEPLIN) 7.5 MG tablet tablet, Take 7.5 mg by mouth Daily., Disp: , Rfl:   •  Loratadine 10 MG capsule, loratadine, Disp: , Rfl:   •  Nutritional Supplements (VITAMIN D BOOSTER PO), Vitamin D2, Disp: , Rfl:   •  ondansetron (ZOFRAN) 8 MG tablet, , Disp: , Rfl:   •  promethazine (PHENERGAN) 25 MG tablet, Every 4 (Four) Hours., Disp: , Rfl:   •  pseudoephedrine (SUDAFED) 120 MG 12 hr tablet, Every 12 (Twelve) Hours., Disp: , Rfl:   •  rizatriptan (MAXALT) 10 MG tablet, , Disp: , Rfl:     Objective     Physical Exam:  Vital Signs:   Due to extenuating circumstances and possible current health risks associated with the patient being present in a clinical setting (with current health restrictions in place in regards to possible COVID 19 transmission/exposure), the patient was seen remotely today via a telephone visit.    Physical Exam    Mental Status Exam:   Cooperation: Cooperative  Psychomotor Behavior: Agitated   Mood: Anxious  Affect: Appropriate  Speech: Rapid  Thought Process: Self-defeating at times  Thought Content: Congruent to mood  Suicidal: No SI indicated  Homicidal: No HI indicated   Hallucinations: None   Delusion: None   Memory: Grossly   Orientation: Oriented x4  Reliability: Good  Insight: Fair  Judgement: Fair  Impulse Control: Impulsive      Quality Measures:   Never smoker    Assessment / Plan      Assessment/Plan:   Diagnoses and all orders for this visit:    1. Adjustment disorder with mixed anxiety and depressed mood (Primary)    2. History of migraine with aura    3. Chronic neck and back pain    4. Trauma and stressor-related disorder    The  "patient was tearful and presented with a high level of distress. Initially, added words of support were provided given her fragile emotional state. Guided questions were used to explore the patient's recent experiences and potential stressors. She offered more insight into challenges related to studying for her boards - including her need for a quiet environment, minimal distractions, breaks, etc. She also acknowledged that a lack of support from her boyfriend has contributed to her current anxious state.    Today's session focused on identifying and challenging patterns of thinking that have negatively impacted the patient's mood. Analyzed how her \"fear of failure\" and need to be a \"perfectionist\" are unrealistic expectations. Offered a more appropriate view of herself as being hard-working, thorough, and taking pride in her work. Reaffirmed the patient's ability to pass her boards by studying and receiving accommodations, identified sources of support (e.g., professors), and reviewed how doing so contributes to her  long-term goals of having more well-paid career.     Educated the patient about the four horsemen - damaging behaviors that escalate conflict and erode a relationship. Offered specific examples of 1) criticism, 2) defensiveness, 3) contempt, and 4) stonewalling. Reiterated the importance of antidotes - communication skills, relaxation techniques, and other strategies that counteract the four horsemen and reduce conflict. Discussed how if left unchecked, the four horsemen solidify themselves in a relationship as a normal part of communication.    TREATMENT PLAN/GOALS:   1. Maintain stable mood. Ameliorate or decrease the frequency and severity of depression, anxiety, and panic by attending routine cognitive behavior therapy.  2. Verbalize healthy, realistic cognitive messages that promote harmony with others, self-acceptance, and confidence.   3. Maintain scheduled appointments with treatment providers, " including therapists, primary care providers, specialists, etc.  4. Implement relaxation strategies in her daily routine to combat stress and make chronic pain more manageable.  5. Improve support system. Actively participate in individual and/or group psychotherapy routinely.   6. Attain added resources due to problems with infertility.     7.   The patient reports being motivated to achieve her short-term goal of taking oral exams by the end of September, as needed to earn her degree. She states she scheduled her final exams for the 28th, 29th, and 30th of September.     The patient acknowledged and verbally consented to continue with current treatment plan. She seems reasonably able to adhere to treatment plan.         Follow Up:   Return in about 1 week (around 10/3/2022) for Counseling Session.      Rubi Garduno, PhD, Temp Licensed Psychologist

## 2023-01-16 NOTE — PROGRESS NOTES
Telehealth Visit      Patient Name: Sylvia Douglass  : 1980   MRN: [id number]   Start: 1:25  Stop: 2:16  Referring Physician: Lisset Velásquez MD    Chief Complaint:      ICD-10-CM ICD-9-CM   1. Adjustment disorder with mixed anxiety and depressed mood  F43.23 309.28   2. History of migraine with aura  Z86.69 V12.49   3. Chronic neck and back pain  M54.2 723.1    M54.9 724.5    G89.29 338.29   4. Trauma and stressor-related disorder  F43.9 309.81     308.9        This provider is located at [facility], using a telephone in a secure private environment. The Patient is seen remotely at their home address in [place], using a private telephone.  The patient is unable to be seen through a MyChart Video Visit through [facility] at today's encounter because of no access, therefore a telephone encounter was conducted. Patient is being evaluated/treated via telehealth by telephone, and stated they are in a secure environment for this session. The patient's condition being diagnosed/treated is appropriate for telemedicine. The provider identified herself as well as her credentials.   The patient, and/or patient's guardian, consent to be seen remotely, and when consent is given they understand that the consent allows for patient identifiable information to be sent to a third party as needed.   They may refuse to be seen remotely at any time. The electronic data is encrypted and password protected, and the patient and/or guardian has been advised of the potential risks to privacy not withstanding such measures.    You have chosen to receive care through a telephone visit. Do you consent to use a telephone visit for your medical care today? YES.  This visit has been rescheduled as a phone visit to comply with patient safety concerns in accordance with CDC recommendations.    History of Present Illness: Sylvia Douglass is a 42 y.o. female who I  "spoke with on the phone today for a follow up care navigation and supportive psychotherapy appointment. Ms. Douglass has current diagnoses of an, \"asymptomatic vertex  meningioma and neck pain\". She was recommended to repeat an MRI of the brain in two years or sooner if clinically indicated. Surgery due to neck pain was not indicated and she was referred for physical therapy and pain management.       Ms. Douglass reports having \"severe anxiety and depression\".  Her current psychiatric medications include Xanax and Hydroxyzine. Past treatment providers include Sulma Carpenter LCSW, of the [facility] for several years. She states she was, \"not deemed to be a good candidate for EMDR because I take Xanax\". She also attends counseling with BAILEY Santos to learn more effective ways to communicate and resolve conflict with her partner.    Subjective      Review of Systems:   The following portions of the patient's history were reviewed and updates as appropriate: allergies, current medications, past family history, past medical history, past social history, past surgical history and problem list.     Interval History:   Sylvia reported that she was able to complete the written portion of her boards, but that she is still studying for her upcoming oral exams scheduled on the 19th. She stated that her boyfriend \"made it so much harder for me to study\" and that \"he is angry that I'm not spending time with him\". She notes that the added stress has resulted with increased pain in her head and neck.      Medications:     Current Outpatient Medications:   •  ALPRAZolam (XANAX) 0.5 MG tablet, Take 0.5 mg by mouth., Disp: , Rfl:   •  aluminum chloride (Drysol) 20 % external solution, Drysol 20 % topical solution  APPLY TO AFFECTED AREA START 2X WEEK INCREASE AS TOLERATED TO QHS, Disp: , Rfl:   •  bimatoprost (LATISSE) 0.03 % ophthalmic solution, Latisse 0.03 % eyelash drops  APPLY 1 DROP TO APPLICATOR AND APPLY TO UPPER EYELID, " ALONG EYELASHES, BY TOPICAL ROUTE ONCE DAILY AT NIGHTTIME, Disp: , Rfl:   •  BIOTIN PO, biotin, Disp: , Rfl:   •  butalbital-acetaminophen-caffeine (FIORICET, ESGIC) -40 MG per tablet, , Disp: , Rfl:   •  cyclobenzaprine (FLEXERIL) 10 MG tablet, Take 1 tablet by mouth 3 (Three) Times a Day., Disp: 15 tablet, Rfl: 0  •  EPINEPHrine (EPIPEN) 0.3 MG/0.3ML solution auto-injector injection, , Disp: , Rfl:   •  famotidine (PEPCID) 20 MG tablet, , Disp: , Rfl:   •  HYDROcodone-acetaminophen (NORCO) 7.5-325 MG per tablet, , Disp: , Rfl:   •  hydrOXYzine (ATARAX) 25 MG tablet, , Disp: , Rfl:   •  l-methylfolate calcium (DEPLIN) 7.5 MG tablet tablet, Take 7.5 mg by mouth Daily., Disp: , Rfl:   •  Loratadine 10 MG capsule, loratadine, Disp: , Rfl:   •  Nutritional Supplements (VITAMIN D BOOSTER PO), Vitamin D2, Disp: , Rfl:   •  ondansetron (ZOFRAN) 8 MG tablet, , Disp: , Rfl:   •  promethazine (PHENERGAN) 25 MG tablet, Every 4 (Four) Hours., Disp: , Rfl:   •  pseudoephedrine (SUDAFED) 120 MG 12 hr tablet, Every 12 (Twelve) Hours., Disp: , Rfl:   •  rizatriptan (MAXALT) 10 MG tablet, , Disp: , Rfl:     Objective     Physical Exam:  Vital Signs:   Due to extenuating circumstances and possible current health risks associated with the patient being present in a clinical setting (with current health restrictions in place in regards to possible COVID 19 transmission/exposure), the patient was seen remotely today via a telephone visit.     Physical Exam    Mental Status Exam:   Cooperation: Cooperative  Psychomotor Behavior: Unremarkable   Mood: Dysthymic   Affect: Appropriate  Speech: Clear  Thought Process: Goal directed  Thought Content: Congruent to mood  Suicidal: No SI indicated   Homicidal: No HI indicated  Hallucinations: None   Delusion: None  Memory: Grossly intact  Orientation: Orientated x4  Reliability: Good   Insight: Fair  Judgement: Fair to Good  Impulse Control:  Can be impulsive. Influenced by mood and  "pain.    Quality Measures:   Never smoker    Assessment / Plan      Assessment/Plan:   Diagnoses and all orders for this visit:    1. Adjustment disorder with mixed anxiety and depressed mood (Primary)    2. History of migraine with aura    3. Chronic neck and back pain    4. Trauma and stressor-related disorder    Praised the patient for her progress made with completing her written exams. Explored for potential challenges associated with her passing oral exams, including the conflict with her boyfriend. The patient shared feelings of hurt and frustration and verbalized an understanding of how her boyfriend's disapproval and harsh criticism were damaging to her self-esteem. Gently confronted the patient about how rigid beliefs, verbal and emotional abuse, and unwillingness to make compromises continues to negatively impact their relationship. She agreed and states that her boyfriend, \"has been too in the thick of his work\" to notice the progress that she has made.     Assisted the patient with identifying her own role in the conflict. She was asked to describe how she was able to devote time to the relationship in the past, despite having a full-time job. The patient states that in the past, the roles were reversed and she was the bread-winner of the relationship for several years. She admitted to bad habits of \"constantly thinking about work\" and not being present in her relationship. Collaboratively discussed how having no down-time at work and not focusing on self-care were influential. Also, identified how not having family members and friends for added support was  problematic.     The patient was encouraged to continue working with her therapist on improving communication and conflict-resolution skills. Despite her report that her boyfriend refuses to participate in therapy, she agreed that responding to him in healthier ways may help with modeling expected behaviors. Regarding test-anxiety and fears that she " "may not pay her exam, an analogy that \"prior preparation prevents poor performance\" was offered. She was encouraged to send an email to her teacher to assure that she is properly prepared and knows what to expect. Challenged negative thinking by reiterating to the patient her ability to pass with added support and understanding areas that her board members may be more flexible with scoring than others (e.g., communications).       TREATMENT PLAN/GOALS:   1. Maintain stable mood. Ameliorate or decrease the frequency and severity of depression, anxiety, and panic by attending routine cognitive behavior therapy.  2. Verbalize healthy, realistic cognitive messages that promote harmony with others, self-acceptance, and confidence.   3. Maintain scheduled appointments with treatment providers, including therapists, primary care providers, specialists, etc.  4. Implement relaxation strategies in her daily routine to combat stress and make chronic pain more manageable.  5. Improve support system. Actively participate in individual and/or group psychotherapy routinely.   6. Attain added resources due to problems with infertility.     7. Successfully pass her oral exam scheduled for the 19th.     The patient acknowledged and verbally consented to continue with current treatment plan. She seems reasonably able to adhere to treatment plan.         SAFETY CONSIDERATIONS:  The patient adamantly denies suicidal ideation or recent episodes of self-harm. She agreed to call the office and/or go to the ER if her psychiatric symptoms worsen. She agreed with the current safety plan.        Follow Up:   Return in about 1 week (around 10/10/2022) for Counseling Session.      Rubi Garduno, PhD, Temp Licensed Psychologist     "

## 2023-01-16 NOTE — PROGRESS NOTES
"     Telehealth Visit      Patient Name: Sylvia Douglass  : 1980   MRN: [id number]   Start: 2:18  Stop: 3:05  Referring Physician: Lisset Velásquez MD    Chief Complaint:      ICD-10-CM ICD-9-CM   1. Adjustment disorder with mixed anxiety and depressed mood  F43.23 309.28   2. History of migraine with aura  Z86.69 V12.49   3. Chronic neck and back pain  M54.2 723.1    M54.9 724.5    G89.29 338.29   4. Trauma and stressor-related disorder  F43.9 309.81     308.9        This provider is located at [facility], using a telephone in a secure private environment. The Patient is seen remotely at their home address in [place], using a private telephone.  The patient is unable to be seen through a MyChart Video Visit through [facility] at today's encounter because of no access, therefore a telephone encounter was conducted. Patient is being evaluated/treated via telehealth by telephone, and stated they are in a secure environment for this session. The patient's condition being diagnosed/treated is appropriate for telemedicine. The provider identified herself as well as her credentials.   The patient, and/or patient's guardian, consent to be seen remotely, and when consent is given they understand that the consent allows for patient identifiable information to be sent to a third party as needed.   They may refuse to be seen remotely at any time. The electronic data is encrypted and password protected, and the patient and/or guardian has been advised of the potential risks to privacy not withstanding such measures.    You have chosen to receive care through a telephone visit. Do you consent to use a telephone visit for your medical care today? YES.    History of Present Illness: Sylvia Douglass is a 42 y.o. female who I spoke with on the phone today for a crisis session, as requested. Ms. Douglass has current diagnoses of an, \"asymptomatic vertex " " meningioma and neck pain\". She was recommended to repeat an MRI of the brain in two years or sooner if clinically indicated. Surgery due to neck pain was not indicated and she was referred for physical therapy and pain management.       Ms. Douglass reports having \"severe anxiety and depression\".  Her current psychiatric medications include Xanax and Hydroxyzine. Past treatment providers include Sulma Carpenter LCSW, of the  clinic for several years. She states she was, \"not deemed to be a good candidate for EMDR because I take Xanax\". She also attends counseling with BAILEY Santos to learn more effective ways to communicate and resolve conflict with her partner.     Subjective      Review of Systems:   The following portions of the patient's history were reviewed and updates as appropriate: allergies, current medications, past family history, past medical history, past social history, past surgical history and problem list.     Interval History:   Sylvia called to request a crisis session with this provider due to high anxiety related to taking her boards on the 19th. She states having a meeting with her chair about the questions she will need to defend, but reports panicking since he informed her that she will need to provide more information. She queried about whether or not she should cancel her test.    Medications:     Current Outpatient Medications:   •  ALPRAZolam (XANAX) 0.5 MG tablet, Take 0.5 mg by mouth., Disp: , Rfl:   •  aluminum chloride (Drysol) 20 % external solution, Drysol 20 % topical solution  APPLY TO AFFECTED AREA START 2X WEEK INCREASE AS TOLERATED TO QHS, Disp: , Rfl:   •  bimatoprost (LATISSE) 0.03 % ophthalmic solution, Latisse 0.03 % eyelash drops  APPLY 1 DROP TO APPLICATOR AND APPLY TO UPPER EYELID, ALONG EYELASHES, BY TOPICAL ROUTE ONCE DAILY AT NIGHTTIME, Disp: , Rfl:   •  BIOTIN PO, biotin, Disp: , Rfl:   •  butalbital-acetaminophen-caffeine (FIORICET, ESGIC) -40 MG per " tablet, , Disp: , Rfl:   •  cyclobenzaprine (FLEXERIL) 10 MG tablet, Take 1 tablet by mouth 3 (Three) Times a Day., Disp: 15 tablet, Rfl: 0  •  EPINEPHrine (EPIPEN) 0.3 MG/0.3ML solution auto-injector injection, , Disp: , Rfl:   •  famotidine (PEPCID) 20 MG tablet, , Disp: , Rfl:   •  HYDROcodone-acetaminophen (NORCO) 7.5-325 MG per tablet, , Disp: , Rfl:   •  hydrOXYzine (ATARAX) 25 MG tablet, , Disp: , Rfl:   •  l-methylfolate calcium (DEPLIN) 7.5 MG tablet tablet, Take 7.5 mg by mouth Daily., Disp: , Rfl:   •  Loratadine 10 MG capsule, loratadine, Disp: , Rfl:   •  Nutritional Supplements (VITAMIN D BOOSTER PO), Vitamin D2, Disp: , Rfl:   •  ondansetron (ZOFRAN) 8 MG tablet, , Disp: , Rfl:   •  promethazine (PHENERGAN) 25 MG tablet, Every 4 (Four) Hours., Disp: , Rfl:   •  pseudoephedrine (SUDAFED) 120 MG 12 hr tablet, Every 12 (Twelve) Hours., Disp: , Rfl:   •  rizatriptan (MAXALT) 10 MG tablet, , Disp: , Rfl:     Objective     Physical Exam:  Vital Signs:   Due to extenuating circumstances and possible current health risks associated with the patient being present in a clinical setting (with current health restrictions in place in regards to possible COVID 19 transmission/exposure), the patient was seen remotely today via a telephone visit.     Physical Exam    Mental Status Exam:   Cooperation: Cooperative  Psychomotor Behavior: Unremarkable   Mood: Anxious  Affect: Labile  Speech: Tangential  Thought Process: Normal   Thought Content: Anxiety-provoking  Suicidal: No SI indicated  Homicidal: No HI indicated  Hallucinations: None  Delusion: None   Memory: Grossly intact  Orientation: Orientated x4  Reliability: Good  Insight: Fair  Judgement: Fair  Impulse Control: Influenced by mood    Quality Measures:   Never smoker    Assessment / Plan      Assessment/Plan:   Diagnoses and all orders for this visit:    1. Adjustment disorder with mixed anxiety and depressed mood (Primary)    2. History of migraine with  "aura    3. Chronic neck and back pain    4. Trauma and stressor-related disorder      Initially, provided the patient with added words of comfort given her fragile emotional state. She reported being grateful for this appointment and was less panicked since she had initially called. Explored for self-talk that mediated the patient's fear response. She offered some insight into how advice given to her by her , led to thoughts of a catastrophic outcome that she would fail her oral exam. Normalized how a  \"fear of failure\" is valid, but can become a distorted way of viewing herself that is fueled by anxiety.     Today's session focused on identifying, challenging, and replacing the patient's biased, fearful self-talk with reality-based positive self-talk. Analyzed how the advice from her chair was meant to serve as constructive criticism, rather than a sign that she will fail. Collaboratively discussed particular topics that were confusing to the patient (e.g., population, construct, validity, avoiding bias, etc.) and offered some advice, given this provider's past experience with conducting a study.     In effort to reduce her test-anxiety, identified a worst case scenario (e.g., she does fail the exam). Offered a more realistic and healthier solution - to retake the test versus giving up. She agreed that self-defeating thoughts can contribute to avoidance of taking the test, which can exacerbate or prolong her test anxiety. Given her improved understanding of the concepts discussed, she agreed to take her oral exams as scheduled.      TREATMENT PLAN/GOALS:   1. Maintain stable mood. Ameliorate or decrease the frequency and severity of depression, anxiety, and panic by attending routine cognitive behavior therapy.  2. Verbalize healthy, realistic cognitive messages that promote harmony with others, self-acceptance, and confidence.   3. Maintain scheduled appointments with treatment providers, " including therapists, primary care providers, specialists, etc.  4. Implement relaxation strategies in her daily routine to combat stress and make chronic pain more manageable.  5. Improve support system. Actively participate in individual and/or group psychotherapy routinely.   6. Attain added resources due to problems with infertility.     7. Successfully pass her oral exam scheduled for the 19th.      The patient acknowledged and verbally consented to continue with current treatment plan. She seems reasonably able to adhere to treatment plan.          SAFETY CONSIDERATIONS:  The patient adamantly denies suicidal ideation or recent episodes of self-harm. She agreed to call the office and/or go to the ER if her psychiatric symptoms worsen. She agreed with the current safety plan.         Follow Up:   Return in about 1 week (around 10/21/2022) for Counseling Session.      Rubi Garduno, PhD, Temp Licensed Psychologist

## 2023-01-16 NOTE — PROGRESS NOTES
Telehealth Visit      Patient Name: Sylvia Douglass  : 1980   MRN: [id number]   Start: 1:14  Stop: 1:59  Referring Physician: Lisset Velásquez MD    Chief Complaint:      ICD-10-CM ICD-9-CM   1. Adjustment disorder with mixed anxiety and depressed mood  F43.23 309.28   2. History of migraine with aura  Z86.69 V12.49   3. Chronic neck and back pain  M54.2 723.1    M54.9 724.5    G89.29 338.29   4. Trauma and stressor-related disorder  F43.9 309.81     308.9        This provider is located at [facility], using a telephone in a secure private environment. The Patient is seen remotely at their home address in [place], using a private telephone.  The patient is unable to be seen through a MyChart Video Visit through [facility] at today's encounter because of no access, therefore a telephone encounter was conducted. Patient is being evaluated/treated via telehealth by telephone, and stated they are in a secure environment for this session. The patient's condition being diagnosed/treated is appropriate for telemedicine. The provider identified herself as well as her credentials.   The patient, and/or patient's guardian, consent to be seen remotely, and when consent is given they understand that the consent allows for patient identifiable information to be sent to a third party as needed.   They may refuse to be seen remotely at any time. The electronic data is encrypted and password protected, and the patient and/or guardian has been advised of the potential risks to privacy not withstanding such measures.    You have chosen to receive care through a telephone visit. Do you consent to use a telephone visit for your medical care today? YES.    History of Present Illness: Sylvia Douglass is a 42 y.o. female who I spoke with on the phone today for a follow up care navigation and supportive psychotherapy appointment. Ms. Douglass has current  "diagnoses of an, \"asymptomatic vertex  meningioma and neck pain\". She was recommended to repeat an MRI of the brain in two years or sooner if clinically indicated. Surgery due to neck pain was not indicated and she was referred for physical therapy and pain management.       Ms. Douglass reports having \"severe anxiety and depression\".  Her current psychiatric medications include Xanax and Hydroxyzine. Past treatment providers include Sulma Carpenter LCSW, of the [facility] for several years. She states she was, \"not deemed to be a good candidate for EMDR because I take Xanax\". She also attends counseling with BAILEY Santos to learn more effective ways to communicate and resolve conflict with her partner.     Subjective      Review of Systems:   The following portions of the patient's history were reviewed and updates as appropriate: allergies, current medications, past family history, past medical history, past social history, past surgical history and problem list.     Interval History:   Sylvia reported having a good weekend with her boyfriend and that she also enjoyed time spent with one of his co-worker's girlfriend. Despite getting along well with the girlfriend, she admitted to some feelings of jealousy after she found out that she got the job as a  that they both applied for. She states, \"I should have been happy for her, but I couldn't help feeling disappointed\".     Medications:     Current Outpatient Medications:   •  ALPRAZolam (XANAX) 0.5 MG tablet, Take 0.5 mg by mouth., Disp: , Rfl:   •  aluminum chloride (Drysol) 20 % external solution, Drysol 20 % topical solution  APPLY TO AFFECTED AREA START 2X WEEK INCREASE AS TOLERATED TO QHS, Disp: , Rfl:   •  bimatoprost (LATISSE) 0.03 % ophthalmic solution, Latisse 0.03 % eyelash drops  APPLY 1 DROP TO APPLICATOR AND APPLY TO UPPER EYELID, ALONG EYELASHES, BY TOPICAL ROUTE ONCE DAILY AT NIGHTTIME, Disp: , Rfl:   •  BIOTIN PO, biotin, Disp: , Rfl: "   •  butalbital-acetaminophen-caffeine (FIORICET, ESGIC) -40 MG per tablet, , Disp: , Rfl:   •  cyclobenzaprine (FLEXERIL) 10 MG tablet, Take 1 tablet by mouth 3 (Three) Times a Day., Disp: 15 tablet, Rfl: 0  •  EPINEPHrine (EPIPEN) 0.3 MG/0.3ML solution auto-injector injection, , Disp: , Rfl:   •  famotidine (PEPCID) 20 MG tablet, , Disp: , Rfl:   •  HYDROcodone-acetaminophen (NORCO) 7.5-325 MG per tablet, , Disp: , Rfl:   •  hydrOXYzine (ATARAX) 25 MG tablet, , Disp: , Rfl:   •  l-methylfolate calcium (DEPLIN) 7.5 MG tablet tablet, Take 7.5 mg by mouth Daily., Disp: , Rfl:   •  Loratadine 10 MG capsule, loratadine, Disp: , Rfl:   •  Nutritional Supplements (VITAMIN D BOOSTER PO), Vitamin D2, Disp: , Rfl:   •  ondansetron (ZOFRAN) 8 MG tablet, , Disp: , Rfl:   •  promethazine (PHENERGAN) 25 MG tablet, Every 4 (Four) Hours., Disp: , Rfl:   •  pseudoephedrine (SUDAFED) 120 MG 12 hr tablet, Every 12 (Twelve) Hours., Disp: , Rfl:   •  rizatriptan (MAXALT) 10 MG tablet, , Disp: , Rfl:     Objective     Physical Exam:  Vital Signs:   Due to extenuating circumstances and possible current health risks associated with the patient being present in a clinical setting (with current health restrictions in place in regards to possible COVID 19 transmission/exposure), the patient was seen remotely today via a telephone visit.     Physical Exam    Mental Status Exam:   Cooperation: Cooperative  Psychomotor Behavior: Unremarkable   Mood: Dysthymic  Affect: Appropriate  Speech: Intelligible  Thought Process: Goal directed  Thought Content: Congruent to mood  Suicidal: No SI indicated  Homicidal: No HI indicated  Hallucinations: None   Delusion: None  Memory: Grossly intact  Orientation: Orientated x4  Reliability: Good  Insight: Fair  Judgement: Fair to Good  Impulse Control: Influenced at times by impulsivity, mood, and chronic pain    Quality Measures:   Never smoker    I advised Sylvia Douglass of the risks of  "tobacco use.     Assessment / Plan      Assessment/Plan:   Diagnoses and all orders for this visit:    1. Adjustment disorder with mixed anxiety and depressed mood (Primary)    2. History of migraine with aura    3. Chronic neck and back pain    4. Trauma and stressor-related disorder    Explored the patient's feelings of inadequacy, fear, and failure secondary to unemployment. She offered more insight into related challenges with gaining employment, including a criminal record of theft in 2015. Collaboratively discussed ways to make herself more marketable and competitive for hire - including having her record expunged and earning a degree to attain a better salary. A current challenge for the patient is that there are very few jobs that offer her expected salary.     Offered an alternative view that the benefits offered by some employers can make up for the lower pay rate. She agreed that she would make a compromise for a lower paying position that offered fertility insurance benefits. 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 She verbalized an understanding of how having low self-esteem, conflict in her relationship, and depressive/anxious thinking are influential on self-perception and job satisfaction.     Discussed how all thoughts are valid but not usually true, particularly related to thoughts of hopelessness, worthlessness, rejection, catastrophizing, negatively predicting the future, etc. She was challenged to not focus on her perceived weaknesses and instead, utilize her personal strengths to attain gainful employment. Clarified healthier ways of thinking and personal traits that may allow her to be more successful long-term including improved " self-confidence, persistence, and taking pride in her work.     TREATMENT PLAN/GOALS:   1. Maintain stable mood. Ameliorate or decrease the frequency and severity of depression, anxiety, and panic by attending routine cognitive behavior therapy.  2. Verbalize healthy, realistic cognitive messages that promote harmony with others, self-acceptance, and confidence.   3. Maintain scheduled appointments with treatment providers, including therapists, primary care providers, specialists, etc.  4. Implement relaxation strategies in her daily routine to combat stress and make chronic pain more manageable.  5. Improve support system. Actively participate in individual and/or group psychotherapy routinely.   6. Attain added resources due to problems with infertility.     7. Successfully pass her oral exam scheduled for the 19th.      The patient acknowledged and verbally consented to continue with current treatment plan. She seems reasonably able to adhere to treatment plan.         SAFETY CONSIDERATIONS:  The patient adamantly denies suicidal ideation or recent episodes of self-harm. She agreed to call the office and/or go to the ER if her psychiatric symptoms worsen. She agreed with the current safety plan.      Follow Up:   Return in about 1 week (around 10/17/2022) for Counseling Session.      Rubi Garduno, PhD, Temp Licensed Psychologist

## 2023-01-17 NOTE — PROGRESS NOTES
Telehealth Visit      Patient Name: Sylvia Douglass  : 1980   MRN: [id number]   Start: 2:31  Stop: 3:18    Referring Physician: Lisset Velásquez MD    Chief Complaint:      ICD-10-CM ICD-9-CM   1. Adjustment disorder with mixed anxiety and depressed mood  F43.23 309.28   2. Chronic neck and back pain  M54.2 723.1    M54.9 724.5    G89.29 338.29   3. Trauma and stressor-related disorder  F43.9 309.81     308.9   4. History of migraine with aura  Z86.69 V12.49        This provider is located at [facility], using a telephone in a secure private environment. The Patient is seen remotely at their home address in [place], using a private telephone.  The patient is unable to be seen through a MyChart Video Visit through [facility] at today's encounter because of no access, therefore a telephone encounter was conducted. Patient is being evaluated/treated via telehealth by telephone, and stated they are in a secure environment for this session. The patient's condition being diagnosed/treated is appropriate for telemedicine. The provider identified herself as well as her credentials.   The patient, and/or patient's guardian, consent to be seen remotely, and when consent is given they understand that the consent allows for patient identifiable information to be sent to a third party as needed.   They may refuse to be seen remotely at any time. The electronic data is encrypted and password protected, and the patient and/or guardian has been advised of the potential risks to privacy not withstanding such measures.    You have chosen to receive care through a telephone visit. Do you consent to use a telephone visit for your medical care today? YES.     History of Present Illness: Sylvia Douglass is a 42 y.o. female who I spoke with on the phone today for follow up care navigation and supportive psychotherapy. Ms. Douglass has a history of an  asymptomatic vertex meningioma, migraine headaches, and neck pain. She reports a psychiatric history of trauma, severe anxiety, and depression. Past providers have included her therapist, Sulma Carpenter LCSW, at a  clinic for several years. Additionally, she participates in relationship counseling with BAILEY Santos.  Subjective      Review of Systems:   The following portions of the patient's history were reviewed and updates as appropriate: allergies, current medications, past family history, past medical history, past social history, past surgical history and problem list.     Interval History:   Sylvia was happy to report that she had successfully passed her oral exams - as needed to achieve her goal of earning a master's degree. She notes that while studying she had an extremely difficult time staying focused on tasks at hand. She also reports a significant history of impulsive behavior. She queried about the possibility of having a diagnosis of ADHD.     Medications:     Current Outpatient Medications:   •  ALPRAZolam (XANAX) 0.5 MG tablet, Take 0.5 mg by mouth., Disp: , Rfl:   •  aluminum chloride (Drysol) 20 % external solution, Drysol 20 % topical solution  APPLY TO AFFECTED AREA START 2X WEEK INCREASE AS TOLERATED TO QHS, Disp: , Rfl:   •  bimatoprost (LATISSE) 0.03 % ophthalmic solution, Latisse 0.03 % eyelash drops  APPLY 1 DROP TO APPLICATOR AND APPLY TO UPPER EYELID, ALONG EYELASHES, BY TOPICAL ROUTE ONCE DAILY AT NIGHTTIME, Disp: , Rfl:   •  BIOTIN PO, biotin, Disp: , Rfl:   •  butalbital-acetaminophen-caffeine (FIORICET, ESGIC) -40 MG per tablet, , Disp: , Rfl:   •  cyclobenzaprine (FLEXERIL) 10 MG tablet, Take 1 tablet by mouth 3 (Three) Times a Day., Disp: 15 tablet, Rfl: 0  •  EPINEPHrine (EPIPEN) 0.3 MG/0.3ML solution auto-injector injection, , Disp: , Rfl:   •  famotidine (PEPCID) 20 MG tablet, , Disp: , Rfl:   •  HYDROcodone-acetaminophen (NORCO) 7.5-325 MG per tablet, , Disp: ,  Rfl:   •  hydrOXYzine (ATARAX) 25 MG tablet, , Disp: , Rfl:   •  l-methylfolate calcium (DEPLIN) 7.5 MG tablet tablet, Take 7.5 mg by mouth Daily., Disp: , Rfl:   •  Loratadine 10 MG capsule, loratadine, Disp: , Rfl:   •  Nutritional Supplements (VITAMIN D BOOSTER PO), Vitamin D2, Disp: , Rfl:   •  ondansetron (ZOFRAN) 8 MG tablet, , Disp: , Rfl:   •  promethazine (PHENERGAN) 25 MG tablet, Every 4 (Four) Hours., Disp: , Rfl:   •  pseudoephedrine (SUDAFED) 120 MG 12 hr tablet, Every 12 (Twelve) Hours., Disp: , Rfl:   •  rizatriptan (MAXALT) 10 MG tablet, , Disp: , Rfl:     Objective     Physical Exam:  Vital Signs:   Due to extenuating circumstances and possible current health risks associated with the patient being present in a clinical setting (with current health restrictions in place in regards to possible COVID 19 transmission/exposure), the patient was seen remotely today via a telephone visit.      Physical Exam    Mental Status Exam:  Cooperation: Cooperative  Psychomotor Behavior: Unremarkable   Mood: Euthymic   Affect: Appropriate  Speech: Normal  Thought Process: Goal directed  Thought Content: Congruent to mood  Suicidal: No SI indicated  Homicidal: No HI indicated  Hallucinations: None reported  Delusion: None   Memory: Grossly intact, reports some short-term and long-term memory issues  Orientation: Orientated x4  Reliability: Good  Insight: Fair  Judgement: Fair to Good  Impulse Control: Impulsive at times. Influenced by pain and mood    Quality Measures:   Never smoker    Assessment / Plan      Assessment/Plan:   Diagnoses and all orders for this visit:    1. Adjustment disorder with mixed anxiety and depressed mood (Primary)    2. Chronic neck and back pain    3. Trauma and stressor-related disorder    4. History of migraine with aura    Praised the patient for her added effort with studying and successfully passing her boards. Collaboratively identified and reviewed practices that were beneficial  (e.g., studying in a quiet room, consulting with her professor, etc.) and related challenges (e.g., time, test-anxiety, etc.). Analyzed how dysfunctional ways of thinking including negative self-talk, fear of failure, and other cognitive distortions, contribute to avoidance. She also verbalized an understanding of the benefits of cognitive challenging in order to cope more effectively.     TREATMENT PLAN/GOALS:   1. Maintain stable mood. Ameliorate or decrease the frequency and severity of depression, anxiety, and panic by attending routine cognitive behavior therapy.  2. Verbalize healthy, realistic cognitive messages that promote harmony with others, self-acceptance, and confidence.   3. Maintain scheduled appointments with treatment providers, including therapists, primary care providers, specialists, etc.  4. Implement relaxation strategies in her daily routine to combat stress and make chronic pain more manageable.  5. Improve support system. Actively participate in individual and/or group psychotherapy routinely.   6. Complete additional psych testing for diagnostic clarification and treatment planning.      The patient seems reasonably able to adhere to the treatment plan.        SAFETY CONSIDERATIONS:  The patient adamantly denies suicidal ideation or recent episodes of self-harm. She agreed to call the office and/or go to the ER if her psychiatric symptoms worsen. She agreed with the current safety plan.     Follow Up:   Return in about 1 week (around 10/31/2022) for Counseling Session.      Rubi Garduno, PhD, Temp Licensed Psychologist

## 2023-01-17 NOTE — PROGRESS NOTES
Telehealth Visit      Patient Name: Sylvia Douglass  : 1980   MRN: [id number]   Start: 10:11   Stop: 11:01  Referring Physician: Lisset Velásquez MD    Chief Complaint:      ICD-10-CM ICD-9-CM   1. Adjustment disorder with mixed anxiety and depressed mood  F43.23 309.28   2. Chronic neck and back pain  M54.2 723.1    M54.9 724.5    G89.29 338.29   3. History of migraine with aura  Z86.69 V12.49   4. Trauma and stressor-related disorder  F43.9 309.81     308.9        This provider is located at [facility], using a telephone in a secure private environment. The Patient is seen remotely at their home address in [place], using a private telephone.  The patient is unable to be seen through a MyChart Video Visit through [facility] at today's encounter because of no access, therefore a telephone encounter was conducted. Patient is being evaluated/treated via telehealth by telephone, and stated they are in a secure environment for this session. The patient's condition being diagnosed/treated is appropriate for telemedicine. The provider identified herself as well as her credentials.   The patient, and/or patient's guardian, consent to be seen remotely, and when consent is given they understand that the consent allows for patient identifiable information to be sent to a third party as needed.   They may refuse to be seen remotely at any time. The electronic data is encrypted and password protected, and the patient and/or guardian has been advised of the potential risks to privacy not withstanding such measures.    You have chosen to receive care through a telephone visit. Do you consent to use a telephone visit for your medical care today? YES.    History of Present Illness: Sylvia Douglass is a 42 y.o. female who I spoke with on the phone today for follow up care navigation and supportive psychotherapy. Ms. Douglass has a history of an  asymptomatic vertex meningioma, migraine headaches, and neck pain. She reports a psychiatric history of trauma, severe anxiety, and depression. Past providers have included her therapist, Sulma Carpenter LCSW, at a  clinic for several years. Additionally, she participates in relationship counseling with BAILEY Santos.    Subjective      Review of Systems:   The following portions of the patient's history were reviewed and updates as appropriate: allergies, current medications, past family history, past medical history, past social history, past surgical history and problem list.     Interval History:   Sylvia reports having recent issues with nose swelling and headaches. She also states a significant history of allergy problems and that she has two allergy doctors. She receives injections routinely to treat her migraines. However, she also notes some feelings of frustration related to being limited by what she can take due to having adverse reactions.     Medications:     Current Outpatient Medications:   •  ALPRAZolam (XANAX) 0.5 MG tablet, Take 0.5 mg by mouth., Disp: , Rfl:   •  aluminum chloride (Drysol) 20 % external solution, Drysol 20 % topical solution  APPLY TO AFFECTED AREA START 2X WEEK INCREASE AS TOLERATED TO QHS, Disp: , Rfl:   •  bimatoprost (LATISSE) 0.03 % ophthalmic solution, Latisse 0.03 % eyelash drops  APPLY 1 DROP TO APPLICATOR AND APPLY TO UPPER EYELID, ALONG EYELASHES, BY TOPICAL ROUTE ONCE DAILY AT NIGHTTIME, Disp: , Rfl:   •  BIOTIN PO, biotin, Disp: , Rfl:   •  butalbital-acetaminophen-caffeine (FIORICET, ESGIC) -40 MG per tablet, , Disp: , Rfl:   •  cyclobenzaprine (FLEXERIL) 10 MG tablet, Take 1 tablet by mouth 3 (Three) Times a Day., Disp: 15 tablet, Rfl: 0  •  EPINEPHrine (EPIPEN) 0.3 MG/0.3ML solution auto-injector injection, , Disp: , Rfl:   •  famotidine (PEPCID) 20 MG tablet, , Disp: , Rfl:   •  HYDROcodone-acetaminophen (NORCO) 7.5-325 MG per tablet, , Disp: , Rfl:  "  •  hydrOXYzine (ATARAX) 25 MG tablet, , Disp: , Rfl:   •  l-methylfolate calcium (DEPLIN) 7.5 MG tablet tablet, Take 7.5 mg by mouth Daily., Disp: , Rfl:   •  Loratadine 10 MG capsule, loratadine, Disp: , Rfl:   •  Nutritional Supplements (VITAMIN D BOOSTER PO), Vitamin D2, Disp: , Rfl:   •  ondansetron (ZOFRAN) 8 MG tablet, , Disp: , Rfl:   •  promethazine (PHENERGAN) 25 MG tablet, Every 4 (Four) Hours., Disp: , Rfl:   •  pseudoephedrine (SUDAFED) 120 MG 12 hr tablet, Every 12 (Twelve) Hours., Disp: , Rfl:   •  rizatriptan (MAXALT) 10 MG tablet, , Disp: , Rfl:     Objective     Physical Exam:  Vital Signs:   Due to extenuating circumstances and possible current health risks associated with the patient being present in a clinical setting (with current health restrictions in place in regards to possible COVID 19 transmission/exposure), the patient was seen remotely today via a telephone visit.    Physical Exam    Mental Status Exam:   Cooperation: Cooperative  Psychomotor Behavior: Unremarkable   Mood: Dysthymic   Affect: Appropriate  Speech: Clear  Thought Process: Logical  Thought Content: Normal  Suicidal: No SI indicated  Homicidal: No HI indicated  Hallucinations: None   Delusion: None  Memory: Grossly intact  Orientation: Not assessed  Reliability: Good  Insight: Fair  Judgement: Good  Impulse Control: More issues with impulsivity    Quality Measures:   Never smoker    Assessment / Plan      Assessment/Plan:   Diagnoses and all orders for this visit:    1. Adjustment disorder with mixed anxiety and depressed mood (Primary)    2. Chronic neck and back pain    3. History of migraine with aura    4. Trauma and stressor-related disorder    Explored the patient's medical and treatment history more in depth. She reported a significant history of side effects that can vary from nightmares to feeling a \"total loss of control\". She also noted having adverse reactions to psychiatric medications including SSRI's, " "anxiolytics, anti-psychotics, etc.     Empathized with the patient's struggles with medication and offered an option for her to undergo genetic testing to identify high-risk medications and to prevent trial-and-error. She reported having a test done in the past and that she would email this provider a copy of her report for her record. Given that there are newer medications since her testing, discussed an option for her to have updated testing. Provided the patient with website information to find a participating provider.    Challenged the patient to consider how she worries less about her brain tumor, despite that it could potentially be a more dangerous condition. She states that removing the tumor is not an option for now and that it is not directly related to her headaches. Analyzed how \"the unknown\" has more of an anxiety-provoking effect than being well informed. Also, discussed how adequately educating herself about the condition itself, being mindful of her physical symptoms, and compliance with routine follow-ups to monitor changes are imperative. Reiterated the importance of early diagnosis and researching more advanced treatment recommendations to prevent poor health outcomes.     TREATMENT PLAN/GOALS:   1. Maintain stable mood. Ameliorate or decrease the frequency and severity of depression, anxiety, and panic by attending routine cognitive behavior therapy.  2. Verbalize healthy, realistic cognitive messages that promote harmony with others, self-acceptance, and confidence.   3. Maintain scheduled appointments with treatment providers, including therapists, primary care providers, specialists, etc.  4. Implement relaxation strategies in her daily routine to combat stress and make chronic pain more manageable.  5. Improve support system. Actively participate in individual and/or group psychotherapy routinely.   6. Complete additional psych testing for diagnostic clarification and treatment " planning.      The patient seems reasonably able to adhere to the treatment plan.        SAFETY CONSIDERATIONS:  The patient adamantly denies suicidal ideation or recent episodes of self-harm. She agreed to call the office and/or go to the ER if her psychiatric symptoms worsen. She agreed with the current safety plan.       Follow Up:   Return in about 1 week (around 11/9/2022) for Counseling Session.      Rubi Garduno, PhD, Temp Licensed Psychologist

## 2023-01-17 NOTE — PROGRESS NOTES
Telehealth Visit      Patient Name: Sylvia Douglass  : 1980   MRN: [id number]     Referring Physician: Lisset Velásquez MD    Chief Complaint:  No diagnosis found.     This provider is located at [facility], using a telephone in a secure private environment. The Patient is seen remotely at their home address in [place], using a private telephone.  The patient is unable to be seen through a MyChart Video Visit through [facility] at today's encounter because ***, therefore a telephone encounter was conducted. Patient is being evaluated/treated via telehealth by telephone, and stated they are in a secure environment for this session. The patient's condition being diagnosed/treated is appropriate for telemedicine. The provider identified herself as well as her credentials.   The patient, and/or patient's guardian, consent to be seen remotely, and when consent is given they understand that the consent allows for patient identifiable information to be sent to a third party as needed.   They may refuse to be seen remotely at any time. The electronic data is encrypted and password protected, and the patient and/or guardian has been advised of the potential risks to privacy not withstanding such measures.    You have chosen to receive care through a telephone visit. Do you consent to use a telephone visit for your medical care today? ***  This visit has been rescheduled as a phone visit to comply with patient safety concerns in accordance with CDC recommendations.    History of Present Illness: Sylvia Douglass is a 42 y.o. female who I spoke with on the phone today for ***       Subjective      Review of Systems:   The following portions of the patient's history were reviewed and updates as appropriate: allergies, current medications, past family history, past medical history, past social history, past surgical history and problem list.      Interval History:   {interval history:61383}    Side Effects  ***    Medications:     Current Outpatient Medications:   •  ALPRAZolam (XANAX) 0.5 MG tablet, Take 0.5 mg by mouth., Disp: , Rfl:   •  aluminum chloride (Drysol) 20 % external solution, Drysol 20 % topical solution  APPLY TO AFFECTED AREA START 2X WEEK INCREASE AS TOLERATED TO QHS, Disp: , Rfl:   •  bimatoprost (LATISSE) 0.03 % ophthalmic solution, Latisse 0.03 % eyelash drops  APPLY 1 DROP TO APPLICATOR AND APPLY TO UPPER EYELID, ALONG EYELASHES, BY TOPICAL ROUTE ONCE DAILY AT NIGHTTIME, Disp: , Rfl:   •  BIOTIN PO, biotin, Disp: , Rfl:   •  butalbital-acetaminophen-caffeine (FIORICET, ESGIC) -40 MG per tablet, , Disp: , Rfl:   •  cyclobenzaprine (FLEXERIL) 10 MG tablet, Take 1 tablet by mouth 3 (Three) Times a Day., Disp: 15 tablet, Rfl: 0  •  EPINEPHrine (EPIPEN) 0.3 MG/0.3ML solution auto-injector injection, , Disp: , Rfl:   •  famotidine (PEPCID) 20 MG tablet, , Disp: , Rfl:   •  HYDROcodone-acetaminophen (NORCO) 7.5-325 MG per tablet, , Disp: , Rfl:   •  hydrOXYzine (ATARAX) 25 MG tablet, , Disp: , Rfl:   •  l-methylfolate calcium (DEPLIN) 7.5 MG tablet tablet, Take 7.5 mg by mouth Daily., Disp: , Rfl:   •  Loratadine 10 MG capsule, loratadine, Disp: , Rfl:   •  Nutritional Supplements (VITAMIN D BOOSTER PO), Vitamin D2, Disp: , Rfl:   •  ondansetron (ZOFRAN) 8 MG tablet, , Disp: , Rfl:   •  promethazine (PHENERGAN) 25 MG tablet, Every 4 (Four) Hours., Disp: , Rfl:   •  pseudoephedrine (SUDAFED) 120 MG 12 hr tablet, Every 12 (Twelve) Hours., Disp: , Rfl:   •  rizatriptan (MAXALT) 10 MG tablet, , Disp: , Rfl:     PHQ-9 Depression Screening  {PHQ-9 Score:29747:::1}    Objective     Physical Exam:  Vital Signs:   Due to extenuating circumstances and possible current health risks associated with the patient being present in a clinical setting (with current health restrictions in place in regards to possible COVID 19 transmission/exposure),  the patient was seen remotely today via a telephone visit. Unable to obtain vital signs due to nature of remote visit.  Height stated at *** inches.  Weight stated at *** pounds.     Physical Exam    Mental Status Exam:   Appearance: ***  Hygiene: ***  Cooperation: ***  Eye Contact: ***  Psychomotor Behavior: ***   Mood: ***  Affect: ***  Speech: ***  Thought Process: ***  Thought Content: ***  Suicidal: ***   Homicidal: ***  Hallucinations: ***   Delusion: ***  Memory: ***  Orientation: ***  Reliability: ***  Insight: ***  Judgement: ***  Impulse Control: ***     Quality Measures:   {[facility]_Smoking_Cessation:24872}    I advised Sylvia Douglass of the risks of tobacco use.       Assessment / Plan      Assessment/Plan:   There are no diagnoses linked to this encounter.     TREATMENT PLAN/GOALS: Continue supportive psychotherapy efforts and medications as indicated. Treatment and medication options discussed during today's visit. Patient ackowledged and verbally consented to continue with current treatment plan and was educated on the importance of compliance with treatment and follow-up appointments. Patient seems reasonably able to adhere to treatment plan.      MEDICATION ISSUES:  INSPECT reviewed as expected  Discussed medication options and treatment plan of prescribed medication as well as the risks, benefits, and side effects including potential falls, possible impaired driving and metabolic adversities among others. Patient is agreeable to call the office with any worsening of symptoms or onset of side effects. Patient is agreeable to call 911 or go to the nearest Emergency Room should he/she begin having Suicidal Ideation or Homicidal Ideations. No medication side effects or related complaints today.     Follow Up:   No follow-ups on file.      Rubi Garduno, PhD, Psychologist
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
## 2023-01-29 NOTE — PROGRESS NOTES
Telehealth Visit      Patient Name: Sylvia Douglass  : 1980   MRN: [id number]   Start:  3:20   Stop: 3:54  Referring Physician: Lisset Velásquez MD    Chief Complaint:      ICD-10-CM ICD-9-CM   1. Adjustment disorder with mixed anxiety and depressed mood  F43.23 309.28   2. Trauma and stressor-related disorder  F43.9 309.81     308.9   3. Chronic neck and back pain  M54.2 723.1    M54.9 724.5    G89.29 338.29        This provider is located at [facility], using a telephone in a secure private environment. The Patient is seen remotely at their home address in [place], using a private telephone.  The patient is unable to be seen through a [facility]Chart Video Visit through [facility] at today's encounter because of no access, therefore a telephone encounter was conducted. Patient is being evaluated/treated via telehealth by telephone, and stated they are in a secure environment for this session. The patient's condition being diagnosed/treated is appropriate for telemedicine. The provider identified herself as well as her credentials.   The patient, and/or patient's guardian, consent to be seen remotely, and when consent is given they understand that the consent allows for patient identifiable information to be sent to a third party as needed.   They may refuse to be seen remotely at any time. The electronic data is encrypted and password protected, and the patient and/or guardian has been advised of the potential risks to privacy not withstanding such measures.    You have chosen to receive care through a telephone visit. Do you consent to use a telephone visit for your medical care today? YES.  This visit has been rescheduled as a phone visit to comply with patient safety concerns in accordance with CDC recommendations.    History of Present Illness: Sylvia Douglass is a 42 y.o. female who I spoke with on the phone today for follow up care  navigation and supportive psychotherapy. Ms. Douglass has a history of an asymptomatic vertex meningioma, migraine headaches, and neck pain. She reports a psychiatric history of trauma, severe anxiety, and depression. Past providers have included her therapist, Sulma Carpenter LCSW, at a  clinic for several years. Additionally, she participates in relationship counseling with BAILEY Santos.    Subjective      Review of Systems:   The following portions of the patient's history were reviewed and updates as appropriate: allergies, current medications, past family history, past medical history, past social history, past surgical history and problem list.     Interval History:   Sylvia called to request a crisis appointment with this provider. She was upset and frustrated that her referral had not been entered properly at another facility - which has delayed her appointment with a specialist due to her increased pelvic pain and history of endometriosis.       Medications:     Current Outpatient Medications:   •  ALPRAZolam (XANAX) 0.5 MG tablet, Take 0.5 mg by mouth., Disp: , Rfl:   •  aluminum chloride (Drysol) 20 % external solution, Drysol 20 % topical solution  APPLY TO AFFECTED AREA START 2X WEEK INCREASE AS TOLERATED TO QHS, Disp: , Rfl:   •  bimatoprost (LATISSE) 0.03 % ophthalmic solution, Latisse 0.03 % eyelash drops  APPLY 1 DROP TO APPLICATOR AND APPLY TO UPPER EYELID, ALONG EYELASHES, BY TOPICAL ROUTE ONCE DAILY AT NIGHTTIME, Disp: , Rfl:   •  BIOTIN PO, biotin, Disp: , Rfl:   •  butalbital-acetaminophen-caffeine (FIORICET, ESGIC) -40 MG per tablet, , Disp: , Rfl:   •  cyclobenzaprine (FLEXERIL) 10 MG tablet, Take 1 tablet by mouth 3 (Three) Times a Day., Disp: 15 tablet, Rfl: 0  •  EPINEPHrine (EPIPEN) 0.3 MG/0.3ML solution auto-injector injection, , Disp: , Rfl:   •  famotidine (PEPCID) 20 MG tablet, , Disp: , Rfl:   •  HYDROcodone-acetaminophen (NORCO) 7.5-325 MG per tablet, , Disp: , Rfl:    •  hydrOXYzine (ATARAX) 25 MG tablet, , Disp: , Rfl:   •  l-methylfolate calcium (DEPLIN) 7.5 MG tablet tablet, Take 7.5 mg by mouth Daily., Disp: , Rfl:   •  Loratadine 10 MG capsule, loratadine, Disp: , Rfl:   •  Nutritional Supplements (VITAMIN D BOOSTER PO), Vitamin D2, Disp: , Rfl:   •  ondansetron (ZOFRAN) 8 MG tablet, , Disp: , Rfl:   •  promethazine (PHENERGAN) 25 MG tablet, Every 4 (Four) Hours., Disp: , Rfl:   •  pseudoephedrine (SUDAFED) 120 MG 12 hr tablet, Every 12 (Twelve) Hours., Disp: , Rfl:   •  rizatriptan (MAXALT) 10 MG tablet, , Disp: , Rfl:     Objective     Physical Exam:    Vital Signs:   Due to extenuating circumstances and possible current health risks associated with the patient being present in a clinical setting (with current health restrictions in place in regards to possible COVID 19 transmission/exposure), the patient was seen remotely today via a telephone visit.     Physical Exam    Mental Status Exam:   Cooperation: Cooperative  Psychomotor Behavior: Agitated  Mood: Anxious  Affect: Appropriate  Speech: Tangential  Thought Process: Logical  Thought Content: Congruent to mood  Suicidal: No SI indicated  Homicidal: No HI indicated  Hallucinations: None   Delusion: None  Memory: Grossly intact  Orientation: Not assessed  Reliability: Good  Insight: Fair  Judgement: Good  Impulse Control: More issues with impulsivity    Quality Measures:   Never smoker    Assessment / Plan      Assessment/Plan:   Diagnoses and all orders for this visit:    1. Adjustment disorder with mixed anxiety and depressed mood (Primary)    2. Trauma and stressor-related disorder    3. Chronic neck and back pain    Offered added words of support given the patient's fragile emotional state. Assisted her with identifying and discussing triggers that led to her feeling overwhelmed (e.g. delayed treatment, chronic pain, etc.). Challenged the patient's all-or-nothing thinking by identifying alternative ways to  respond. Reaffirmed her ability to cope more effectively and a option to go to the ER in the event that her pain becomes unbearable. She was appreciative for the appointment and appeared less distressed by the end of her session.    TREATMENT PLAN/GOALS:   1. Stablize mood. Ameliorate or decrease the frequency and severity of depression, anxiety, and panic by attending routine cognitive behavior therapy.  2. Verbalize healthy, realistic cognitive messages that promote harmony with others, self-acceptance, and confidence.   3. Maintain scheduled appointments with treatment providers, including therapists, primary care providers, specialists, etc.  4. Implement relaxation strategies in her daily routine to combat stress and make chronic pain more manageable.  5. Improve support system. Actively participate in individual and/or group psychotherapy routinely.   6. Complete additional psych testing for diagnostic clarification and treatment planning.      The patient seems reasonably able to adhere to the treatment plan.        SAFETY CONSIDERATIONS:  The patient adamantly denies suicidal ideation or recent episodes of self-harm. She agreed to call the office and/or go to the ER if her psychiatric symptoms worsen. She agreed with the current safety plan.    Follow Up:   Return in about 1 week (around 12/23/2022) for Counseling Session.      Rubi Garduno, PhD, Temp Licensed Psychologist

## 2023-01-29 NOTE — PROGRESS NOTES
Telehealth Visit      Patient Name: Sylvia Douglass  : 1980   MRN: [id number]   Start: 11:27  Stop: 12:13   Referring Physician: Lisest Velásquez MD    Chief Complaint:      ICD-10-CM ICD-9-CM   1. Adjustment disorder with mixed anxiety and depressed mood  F43.23 309.28   2. Chronic neck and back pain  M54.2 723.1    M54.9 724.5    G89.29 338.29   3. History of migraine with aura  Z86.69 V12.49   4. Unemployment  Z56.0 V62.0        This provider is located at [facility], using a telephone in a secure private environment. The Patient is seen remotely at their home address in [place], using a private telephone.  The patient is unable to be seen through a MyChart Video Visit through [facility] at today's encounter because of no internet access, therefore a telephone encounter was conducted. Patient is being evaluated/treated via telehealth by telephone, and stated they are in a secure environment for this session. The patient's condition being diagnosed/treated is appropriate for telemedicine. The provider identified herself as well as her credentials.   The patient, and/or patient's guardian, consent to be seen remotely, and when consent is given they understand that the consent allows for patient identifiable information to be sent to a third party as needed.   They may refuse to be seen remotely at any time. The electronic data is encrypted and password protected, and the patient and/or guardian has been advised of the potential risks to privacy not withstanding such measures.    You have chosen to receive care through a telephone visit. Do you consent to use a telephone visit for your medical care today? YES.  This visit has been rescheduled as a phone visit to comply with patient safety concerns in accordance with CDC recommendations.    History of Present Illness: Sylvia Douglass is a 42 y.o. female who I spoke with on the phone  "today for a follow up care navigation appointment. Medical records indicate that she has current diagnoses of an, \"asymptomatic vertex meningioma and neck pain\". She was recommended to repeat an MRI of the brain in two years or sooner if clinically indicated. Surgery due to neck pain was not indicated and she was referred for physical therapy and pain management.      Ms. Douglass reports having \"severe anxiety and depression\". Her current psychiatric medications include Xanax and Hydroxyzine. She attends psychotherapy sessions with her therapist, Sulma Carpenter LCSW, of the [facility] and has seen her for several years. More recently, she has consulted with a trauma specialist as recommended. She states she was, \"not deemed to be a good candidate for EMDR because I take Xanax\". She requested ongoing appointments with this provider for added support and psychotherapy.  Subjective      Review of Systems:   The following portions of the patient's history were reviewed and updates as appropriate: allergies, current medications, past family history, past medical history, past social history, past surgical history and problem list.     Interval History:   Sylvia reports that she had been more motivated to apply for jobs over the past week. She states a need to move and some fear that her landlord will raise the rent.    Medications:     Current Outpatient Medications:   •  ALPRAZolam (XANAX) 0.5 MG tablet, Take 0.5 mg by mouth., Disp: , Rfl:   •  aluminum chloride (Drysol) 20 % external solution, Drysol 20 % topical solution  APPLY TO AFFECTED AREA START 2X WEEK INCREASE AS TOLERATED TO QHS, Disp: , Rfl:   •  bimatoprost (LATISSE) 0.03 % ophthalmic solution, Latisse 0.03 % eyelash drops  APPLY 1 DROP TO APPLICATOR AND APPLY TO UPPER EYELID, ALONG EYELASHES, BY TOPICAL ROUTE ONCE DAILY AT NIGHTTIME, Disp: , Rfl:   •  BIOTIN PO, biotin, Disp: , Rfl:   •  butalbital-acetaminophen-caffeine (FIORICET, ESGIC) -40 MG per " tablet, , Disp: , Rfl:   •  cyclobenzaprine (FLEXERIL) 10 MG tablet, Take 1 tablet by mouth 3 (Three) Times a Day., Disp: 15 tablet, Rfl: 0  •  EPINEPHrine (EPIPEN) 0.3 MG/0.3ML solution auto-injector injection, , Disp: , Rfl:   •  famotidine (PEPCID) 20 MG tablet, , Disp: , Rfl:   •  HYDROcodone-acetaminophen (NORCO) 7.5-325 MG per tablet, , Disp: , Rfl:   •  hydrOXYzine (ATARAX) 25 MG tablet, , Disp: , Rfl:   •  l-methylfolate calcium (DEPLIN) 7.5 MG tablet tablet, Take 7.5 mg by mouth Daily., Disp: , Rfl:   •  Loratadine 10 MG capsule, loratadine, Disp: , Rfl:   •  Nutritional Supplements (VITAMIN D BOOSTER PO), Vitamin D2, Disp: , Rfl:   •  ondansetron (ZOFRAN) 8 MG tablet, , Disp: , Rfl:   •  promethazine (PHENERGAN) 25 MG tablet, Every 4 (Four) Hours., Disp: , Rfl:   •  pseudoephedrine (SUDAFED) 120 MG 12 hr tablet, Every 12 (Twelve) Hours., Disp: , Rfl:   •  rizatriptan (MAXALT) 10 MG tablet, , Disp: , Rfl:     Objective     Physical Exam:  Vital Signs:   Due to extenuating circumstances and possible current health risks associated with the patient being present in a clinical setting (with current health restrictions in place in regards to possible COVID 19 transmission/exposure), the patient was seen remotely today via a telephone visit.    Physical Exam    Mental Status Exam:   Cooperation: Cooperative  Psychomotor Behavior: Unremarkable   Attention/Concentration: Fair  Affect: Appropriate  Mood: Neutral     Speech: Normal  Thought Process: Goal directed  Thought Content: Appropriate   Suicidal: No SI indicated  Homicidal: No HI indicated  Hallucinations: Unremarkable  Delusion: Unremarkable   Memory: Intact  Orientation: Not assessed  Reliability: Good  Insight: Fair  Judgement: Fair   Impulse Control: Influenced by mood  Functional Status: Mildly impaired    Quality Measures:   Never smoker  Assessment / Plan      Assessment/Plan: *  Diagnoses and all orders for this visit:    1. Adjustment disorder with  "mixed anxiety and depressed mood (Primary)    2. Chronic neck and back pain    3. History of migraine with aura    4. 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 The patient offered more insight into her ability to, \"do anything, I just need to learn the niche of it\". When asked about sources of vocational stress, she admits to past habits of taking on too much responsibility to the point of being overwhelmed. Analyzed the related consequences and identified a more realistic plan to find a job that allows room for   accommodations (e.g., flexible hours, health benefits, FMLA,, etc.). Reaffirmed her ability to cope with hyperarousal, intrusive recollection, avoidance, and numbing behaviors that have triggered anxiety symptoms at work in the past. Analyzed the related benefits of improved awareness, desensitization, and cognitive challenging.      TREATMENT PLAN/GOALS:   1. Stabilize mood. Consult with prescribing provider about medication options for psychiatric symptoms. Ameliorate or decrease the frequency and severity of depression, anxiety, and panic by attending routine cognitive behavior therapy.  2. Verbalize healthy, realistic cognitive messages that promote harmony with others, self-acceptance, and confidence.   3. Maintain scheduled appointments with treatment providers, including therapists, primary care providers, specialists, etc.  4. Implement relaxation strategies in her daily routine to combat stress and make chronic pain more manageable.  5. Improve support system. Actively participate in individual and/or group psychotherapy routinely.   6. Attain resources due to problems with infertility.        The patient acknowledged and verbally consented to continue with current treatment plan and was educated " on the importance of compliance with treatment and follow-up appointments. The patient seems reasonably able to adhere to treatment plan.      SAFETY ISSUES:  The patient adamantly denies suicidal and/or homicidal ideation. She is agreeable to call 911 or go to the nearest Emergency Room should she begin having Suicidal Ideation or Homicidal Ideations.     Follow Up:   Return in about 2 weeks (around 9/9/2022) for Counseling Session.      Rubi Garduno, PhD, Temp Licensed  Psychologist
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
## 2023-01-29 NOTE — PROGRESS NOTES
Telehealth Visit      Patient Name: Sylvia Douglass  : 1980   MRN: [id number]   Start:  1:06  Stop: 1:55  Referring Physician: Lisset Velásquez MD    Chief Complaint:      ICD-10-CM ICD-9-CM   1. Adjustment disorder with mixed anxiety and depressed mood  F43.23 309.28   2. Trauma and stressor-related disorder  F43.9 309.81     308.9   3. Chronic neck and back pain  M54.2 723.1    M54.9 724.5    G89.29 338.29        This provider is located at [facility], using a telephone in a secure private environment. The Patient is seen remotely at their home address in [place], using a private telephone.  The patient is unable to be seen through a MyChart Video Visit through [facility] at today's encounter because of no access, therefore a telephone encounter was conducted. Patient is being evaluated/treated via telehealth by telephone, and stated they are in a secure environment for this session. The patient's condition being diagnosed/treated is appropriate for telemedicine. The provider identified herself as well as her credentials.   The patient, and/or patient's guardian, consent to be seen remotely, and when consent is given they understand that the consent allows for patient identifiable information to be sent to a third party as needed.   They may refuse to be seen remotely at any time. The electronic data is encrypted and password protected, and the patient and/or guardian has been advised of the potential risks to privacy not withstanding such measures.    You have chosen to receive care through a telephone visit. Do you consent to use a telephone visit for your medical care today? YES.  This visit has been rescheduled as a phone visit to comply with patient safety concerns in accordance with CDC recommendations.    History of Present Illness: Sylvia Douglass is a 42 y.o. female who I spoke with on the phone today for  for follow up  care navigation and supportive psychotherapy. Ms. Douglass has a history of an asymptomatic vertex meningioma, migraine headaches, and neck pain. She reports a psychiatric history of trauma, severe anxiety, and depression. Past providers have included her therapist, Sulma Carpenter LCSW, at a  clinic for several years. Additionally, she participates in relationship counseling with BAILEY Santos.   Subjective      Review of Systems:   The following portions of the patient's history were reviewed and updates as appropriate: allergies, current medications, past family history, past medical history, past social history, past surgical history and problem list.     Interval History:   Sylvia shared more about her family psychiatric history of mental illness, specifically related to substance abuse. She admitted to feeling hesitant with sharing parts of her history that were shameful and included traumatic experiences during childhood involved with family members.     Medications:     Current Outpatient Medications:   •  ALPRAZolam (XANAX) 0.5 MG tablet, Take 0.5 mg by mouth., Disp: , Rfl:   •  aluminum chloride (Drysol) 20 % external solution, Drysol 20 % topical solution  APPLY TO AFFECTED AREA START 2X WEEK INCREASE AS TOLERATED TO QHS, Disp: , Rfl:   •  bimatoprost (LATISSE) 0.03 % ophthalmic solution, Latisse 0.03 % eyelash drops  APPLY 1 DROP TO APPLICATOR AND APPLY TO UPPER EYELID, ALONG EYELASHES, BY TOPICAL ROUTE ONCE DAILY AT NIGHTTIME, Disp: , Rfl:   •  BIOTIN PO, biotin, Disp: , Rfl:   •  butalbital-acetaminophen-caffeine (FIORICET, ESGIC) -40 MG per tablet, , Disp: , Rfl:   •  cyclobenzaprine (FLEXERIL) 10 MG tablet, Take 1 tablet by mouth 3 (Three) Times a Day., Disp: 15 tablet, Rfl: 0  •  EPINEPHrine (EPIPEN) 0.3 MG/0.3ML solution auto-injector injection, , Disp: , Rfl:   •  famotidine (PEPCID) 20 MG tablet, , Disp: , Rfl:   •  HYDROcodone-acetaminophen (NORCO) 7.5-325 MG per tablet, , Disp: ,  Rfl:   •  hydrOXYzine (ATARAX) 25 MG tablet, , Disp: , Rfl:   •  l-methylfolate calcium (DEPLIN) 7.5 MG tablet tablet, Take 7.5 mg by mouth Daily., Disp: , Rfl:   •  Loratadine 10 MG capsule, loratadine, Disp: , Rfl:   •  Nutritional Supplements (VITAMIN D BOOSTER PO), Vitamin D2, Disp: , Rfl:   •  ondansetron (ZOFRAN) 8 MG tablet, , Disp: , Rfl:   •  promethazine (PHENERGAN) 25 MG tablet, Every 4 (Four) Hours., Disp: , Rfl:   •  pseudoephedrine (SUDAFED) 120 MG 12 hr tablet, Every 12 (Twelve) Hours., Disp: , Rfl:   •  rizatriptan (MAXALT) 10 MG tablet, , Disp: , Rfl:     Objective     Physical Exam:  Vital Signs:   Due to extenuating circumstances and possible current health risks associated with the patient being present in a clinical setting (with current health restrictions in place in regards to possible COVID 19 transmission/exposure), the patient was seen remotely today via a telephone visit. Unable to obtain vital signs due to nature of remote visit.    Physical Exam    Mental Status Exam:   Cooperation: Cooperative   Psychomotor Behavior: Unremarkable  Attention/Concentration: Good  Affect: Appropriate   Mood: Anxious   Speech: Hesitant  Thought Process: Circumstatial  Thought Content: Appropriate   Suicidal: No SI indicated  Homicidal: No HI indicated  Hallucinations: Unremarkable  Delusions: Unremarkable  Memory: Intact  Orientation: Oriented x3  Reliability: Good  Insight: Fair  Judgement: Fair  Impulse Control: Influenced by mood  Functional Status: Moderately impaired    Quality Measures:   Never smoker    Assessment / Plan      Assessment/Plan:   Diagnoses and all orders for this visit:    1. Adjustment disorder with mixed anxiety and depressed mood (Primary)    2. Trauma and stressor-related disorder    3. Chronic neck and back pain    Empathic listening was used to explore the patient's trauma history. Analyzed how her reluctance to share likely relates to a lifelong fear of rejection. Given a  relatively strong therapeutic rapport with the patient, discussed how negative thinking may be keeping her stuck in the trauma. Discussed how avoidance of talking on anything more than a superficial level can be harmful long-term.     Reaffirmed patient-provider confidentiality. The patient acknowledged having strong emotional responses when discussing her trauma. She agreed to work on resolving the loss and fear associated with her trauma in future sessions in order to begin the healing process.    The patient also complete additional the AUDIT screening tool as requested for follow up assessment.      TREATMENT PLAN/GOALS:   1. Maintain stable mood. Ameliorate or decrease the frequency and severity of depression, anxiety, and panic by attending routine cognitive behavior therapy.  2. Verbalize healthy, realistic cognitive messages that promote harmony with others, self-acceptance, and confidence.   3. Maintain scheduled appointments with treatment providers, including therapists, primary care providers, specialists, etc.  4. Implement relaxation strategies in her daily routine to combat stress and make chronic pain more manageable.  5. Improve support system. Actively participate in individual and/or group psychotherapy routinely.   6. Complete additional psych testing for diagnostic clarification and treatment planning.      The patient seems reasonably able to adhere to the treatment plan.     SAFETY CONSIDERATIONS:  The patient adamantly and convincingly denies current suicidal or homicidal ideation or perceptual disturbance. Assisted him/her with identifying risk factors which would indicate the need for a higher level of care, including thoughts to harm self or others and/or self-harming behavior. The patient is to contact this office, call 911 or 988 for the Suicide and Crisis Lifeline, or present to the nearest emergency room should any of these events occur.     Follow Up:   Return in about 1 week (around  12/15/2022) for Counseling Session.      Rubi Garduno, PhD, Temp Licensed Psychologist

## 2023-01-30 NOTE — PROGRESS NOTES
Telehealth Visit      Patient Name: Sylvia Douglass  : 1980   MRN: [id number]   Start: 2:27    Stop: 3:11  Referring Physician: Lisset Velásquez MD    Chief Complaint:      ICD-10-CM ICD-9-CM   1. Adjustment disorder with mixed anxiety and depressed mood  F43.23 309.28   2. Trauma and stressor-related disorder  F43.9 309.81     308.9   3. Chronic neck and back pain  M54.2 723.1    M54.9 724.5    G89.29 338.29   4. Unemployment  Z56.0 V62.0   5. History of migraine with aura  Z86.69 V12.49        This provider is located at [facility], using a telephone in a secure private environment. The Patient is seen remotely at their home address in [place], using a private telephone.  The patient is unable to be seen through a MyChart Video Visit through [facility] at today's encounter because no access, therefore a telephone encounter was conducted. Patient is being evaluated/treated via telehealth by telephone, and stated they are in a secure environment for this session. The patient's condition being diagnosed/treated is appropriate for telemedicine. The provider identified herself as well as her credentials.   The patient, and/or patient's guardian, consent to be seen remotely, and when consent is given they understand that the consent allows for patient identifiable information to be sent to a third party as needed.   They may refuse to be seen remotely at any time. The electronic data is encrypted and password protected, and the patient and/or guardian has been advised of the potential risks to privacy not withstanding such measures.    You have chosen to receive care through a telephone visit. Do you consent to use a telephone visit for your medical care today? YES.  This visit has been rescheduled as a phone visit to comply with patient safety concerns in accordance with CDC recommendations.    History of Present Illness: Sylvia Jones  "Evonne is a 42 y.o. female who I spoke with on the phone today for follow up care navigation and supportive psychotherapy. Ms. Douglass has a history of an asymptomatic vertex meningioma, migraine headaches, and neck pain. She reports a psychiatric history of trauma, severe anxiety, and depression. Past providers have included her therapist, Sulma Carpenter LCSW, at a  clinic for several years. Additionally, she participates in relationship counseling with BAILEY Santos.  Subjective      Review of Systems:   The following portions of the patient's history were reviewed and updates as appropriate: allergies, current medications, past family history, past medical history, past social history, past surgical history and problem list.     Interval History:   Sylvia reports some concerns related to her mother's mental health and states that, \"she needs an anti-depressant\". She notes that the added focus on her mother has made it more difficult to manage her own tasks.     Medications:     Current Outpatient Medications:   •  ALPRAZolam (XANAX) 0.5 MG tablet, Take 0.5 mg by mouth., Disp: , Rfl:   •  aluminum chloride (Drysol) 20 % external solution, Drysol 20 % topical solution  APPLY TO AFFECTED AREA START 2X WEEK INCREASE AS TOLERATED TO QHS, Disp: , Rfl:   •  bimatoprost (LATISSE) 0.03 % ophthalmic solution, Latisse 0.03 % eyelash drops  APPLY 1 DROP TO APPLICATOR AND APPLY TO UPPER EYELID, ALONG EYELASHES, BY TOPICAL ROUTE ONCE DAILY AT NIGHTTIME, Disp: , Rfl:   •  BIOTIN PO, biotin, Disp: , Rfl:   •  butalbital-acetaminophen-caffeine (FIORICET, ESGIC) -40 MG per tablet, , Disp: , Rfl:   •  cyclobenzaprine (FLEXERIL) 10 MG tablet, Take 1 tablet by mouth 3 (Three) Times a Day., Disp: 15 tablet, Rfl: 0  •  EPINEPHrine (EPIPEN) 0.3 MG/0.3ML solution auto-injector injection, , Disp: , Rfl:   •  famotidine (PEPCID) 20 MG tablet, , Disp: , Rfl:   •  HYDROcodone-acetaminophen (NORCO) 7.5-325 MG per tablet, , " Disp: , Rfl:   •  hydrOXYzine (ATARAX) 25 MG tablet, , Disp: , Rfl:   •  l-methylfolate calcium (DEPLIN) 7.5 MG tablet tablet, Take 7.5 mg by mouth Daily., Disp: , Rfl:   •  Loratadine 10 MG capsule, loratadine, Disp: , Rfl:   •  Nutritional Supplements (VITAMIN D BOOSTER PO), Vitamin D2, Disp: , Rfl:   •  ondansetron (ZOFRAN) 8 MG tablet, , Disp: , Rfl:   •  promethazine (PHENERGAN) 25 MG tablet, Every 4 (Four) Hours., Disp: , Rfl:   •  pseudoephedrine (SUDAFED) 120 MG 12 hr tablet, Every 12 (Twelve) Hours., Disp: , Rfl:   •  rizatriptan (MAXALT) 10 MG tablet, , Disp: , Rfl:     Objective     Physical Exam:  Vital Signs:   Due to extenuating circumstances and possible current health risks associated with the patient being present in a clinical setting (with current health restrictions in place in regards to possible COVID 19 transmission/exposure), the patient was seen remotely today via a telephone visit.     Physical Exam    Mental Status Exam:   Cooperation: Cooperative  Psychomotor Behavior: Unremarkable  Mood: Mildly anxious  Affect: Appropriate  Speech: Clear  Thought Process: Goal-directed   Thought Content: Congruent to mood  Suicidal: No SI indicated  Homicidal: No HI indicated  Hallucinations: None  Delusion: None   Memory: Intact  Orientation: Oriented x3  Reliability: Good  Insight: Fair  Judgement: Good  Impulse Control: Influenced by mood     Quality Measures:   Never smoker  Assessment / Plan      Assessment/Plan:   Diagnoses and all orders for this visit:    1. Adjustment disorder with mixed anxiety and depressed mood (Primary)    2. Trauma and stressor-related disorder    3. Chronic neck and back pain    4. Unemployment    5. History of migraine with aura    Explored relationship dynamics between the patient and her mother. She offered more insight into her experiences of being an only child and a pattern of having to care for he. Validated the patient's related thoughts and feelings associated  with early maturity. Also, analyzed how a negative relationship with her father's wife has had a negative impact on future relationships with women. The patient  acknowledged the importance of boundaries including emotional, space, and time in order to maintain a healthy relationships.     Focused on identifying goals that are time-focused and manageable for positive reinforcement. Identified how she can utilize strategies, such as cleaning one room at a time or using a timer to complete tasks.       TREATMENT PLAN/GOALS:   1. Maintain stable mood. Ameliorate or decrease the frequency and severity of depression, anxiety, and panic by attending routine cognitive behavior therapy.  2. Verbalize healthy, realistic cognitive messages that promote harmony with others, self-acceptance, and confidence.   3. Maintain scheduled appointments with treatment providers, including therapists, primary care providers, specialists, etc.  4. Implement relaxation strategies in her daily routine to combat stress and make chronic pain more manageable.  5. Improve support system. Actively participate in individual and/or group psychotherapy routinely.   6. Complete additional psych testing for diagnostic clarification and treatment planning.      The patient seems reasonably able to adhere to the treatment plan.        SAFETY CONSIDERATIONS:  The patient adamantly denies suicidal ideation or recent episodes of self-harm. She agreed to call the office and/or go to the ER if her psychiatric symptoms worsen. She agreed with the current safety plan.     Follow Up:   Return in about 1 week (around 11/22/2022) for Counseling Session.      Rubi Garduno, PhD, Temp Licensed Psychologist

## 2023-01-30 NOTE — PROGRESS NOTES
Telehealth Visit      Patient Name: Sylvia Douglass  : 1980   MRN: [id number]   Start: 1:03   Stop: 1:51   Referring Physician: Lisset Velásquez MD    Chief Complaint:      ICD-10-CM ICD-9-CM   1. Adjustment disorder with mixed anxiety and depressed mood  F43.23 309.28   2. Trauma and stressor-related disorder  F43.9 309.81     308.9   3. Chronic neck and back pain  M54.2 723.1    M54.9 724.5    G89.29 338.29   4. History of migraine with aura  Z86.69 V12.49   5. Unemployment  Z56.0 V62.0        This provider is located at [facility], using a telephone in a secure private environment. The Patient is seen remotely at their home address in [place], using a private telephone.  The patient is unable to be seen through a MyChart Video Visit through [facility] at today's encounter because of no access, therefore a telephone encounter was conducted. Patient is being evaluated/treated via telehealth by telephone, and stated they are in a secure environment for this session. The patient's condition being diagnosed/treated is appropriate for telemedicine. The provider identified herself as well as her credentials.   The patient, and/or patient's guardian, consent to be seen remotely, and when consent is given they understand that the consent allows for patient identifiable information to be sent to a third party as needed.   They may refuse to be seen remotely at any time. The electronic data is encrypted and password protected, and the patient and/or guardian has been advised of the potential risks to privacy not withstanding such measures.    You have chosen to receive care through a telephone visit. Do you consent to use a telephone visit for your medical care today? YES.  This visit has been rescheduled as a phone visit to comply with patient safety concerns in accordance with CDC recommendations.    History of Present Illness: Sylvia Jones  "Evonne is a 42 y.o. female who I spoke with on the phone today for follow up care navigation and supportive psychotherapy. Ms. Douglass has a history of an asymptomatic vertex meningioma, migraine headaches, and neck pain. She reports a psychiatric history of trauma, severe anxiety, and depression. Past providers have included her therapist, Sulma Carpenter LCSW, at a  clinic for several years. Additionally, she participates in relationship counseling with BAILEY Santos.  Subjective      Review of Systems:   The following portions of the patient's history were reviewed and updates as appropriate: allergies, current medications, past family history, past medical history, past social history, past surgical history and problem list.     Interval History:   Sylvia states that after a meeting with BAILEY Santos, she realized that she, \"was a toxic person with unhealthy behaviors\". She reports that therapy has helped to improve self-awareness of her symptoms.     Medications:     Current Outpatient Medications:   •  ALPRAZolam (XANAX) 0.5 MG tablet, Take 0.5 mg by mouth., Disp: , Rfl:   •  aluminum chloride (Drysol) 20 % external solution, Drysol 20 % topical solution  APPLY TO AFFECTED AREA START 2X WEEK INCREASE AS TOLERATED TO QHS, Disp: , Rfl:   •  bimatoprost (LATISSE) 0.03 % ophthalmic solution, Latisse 0.03 % eyelash drops  APPLY 1 DROP TO APPLICATOR AND APPLY TO UPPER EYELID, ALONG EYELASHES, BY TOPICAL ROUTE ONCE DAILY AT NIGHTTIME, Disp: , Rfl:   •  BIOTIN PO, biotin, Disp: , Rfl:   •  butalbital-acetaminophen-caffeine (FIORICET, ESGIC) -40 MG per tablet, , Disp: , Rfl:   •  cyclobenzaprine (FLEXERIL) 10 MG tablet, Take 1 tablet by mouth 3 (Three) Times a Day., Disp: 15 tablet, Rfl: 0  •  EPINEPHrine (EPIPEN) 0.3 MG/0.3ML solution auto-injector injection, , Disp: , Rfl:   •  famotidine (PEPCID) 20 MG tablet, , Disp: , Rfl:   •  HYDROcodone-acetaminophen (NORCO) 7.5-325 MG per tablet, , Disp: , " Rfl:   •  hydrOXYzine (ATARAX) 25 MG tablet, , Disp: , Rfl:   •  l-methylfolate calcium (DEPLIN) 7.5 MG tablet tablet, Take 7.5 mg by mouth Daily., Disp: , Rfl:   •  Loratadine 10 MG capsule, loratadine, Disp: , Rfl:   •  Nutritional Supplements (VITAMIN D BOOSTER PO), Vitamin D2, Disp: , Rfl:   •  ondansetron (ZOFRAN) 8 MG tablet, , Disp: , Rfl:   •  promethazine (PHENERGAN) 25 MG tablet, Every 4 (Four) Hours., Disp: , Rfl:   •  pseudoephedrine (SUDAFED) 120 MG 12 hr tablet, Every 12 (Twelve) Hours., Disp: , Rfl:   •  rizatriptan (MAXALT) 10 MG tablet, , Disp: , Rfl:   Objective     Physical Exam:  Vital Signs:   Due to extenuating circumstances and possible current health risks associated with the patient being present in a clinical setting (with current health restrictions in place in regards to possible COVID 19 transmission/exposure), the patient was seen remotely today via a telephone visit.     Physical Exam    Mental Status Exam:   Cooperation: Cooperative  Psychomotor Behavior: Unremarkable  Mood: Dysthymic  Affect: Labile  Speech: Clear  Thought Process: Goal-directed   Thought Content: Congruent to mood  Suicidal: No SI indicated  Homicidal: No HI indicated  Hallucinations: None  Delusion: None   Memory: Intact  Orientation: Oriented x3  Reliability: Good  Insight: Fair  Judgement: Good  Impulse Control: Influenced by mood     Quality Measures:   Never smoker  Assessment / Plan      Assessment/Plan:   Diagnoses and all orders for this visit:    1. Adjustment disorder with mixed anxiety and depressed mood (Primary)    2. Trauma and stressor-related disorder    3. Chronic neck and back pain    4. History of migraine with aura    5. Unemployment    Rather than focusing on mistakes made in the past, the patient was challenged to view her situation nonjudmentally. She was also encouraged to utilize a personal strength of being self-educated about her conditions and advocating for self. She verbalized an  "understanding of her need for lifelong monitoring of her aneurysm as a preventative measure. Reiterated the importance of patient driven strategies in tandem with pharmacological and physical interventions to reduce the frequency and severity of chronic pain and/or migraine headaches.     Identified other health conditions that may complicate the patient's ability to cope. The patient shared more about how a knee injury in March 2021 resulted with a bone contusion. Analyzed the related consequences associated with her limited mobility (e.g., weight gain, isolation) and the possibility of still having to undergo surgery, \"my surgeon recommended a bone stimulator\" given that less invasive treatments have been effective (e.g., physical therapy). Collaboratively agreed to update her treatment plan and to gain additional sources of support prior to surgery.     TREATMENT PLAN/GOALS:   1. Maintain stable mood. Ameliorate or decrease the frequency and severity of depression, anxiety, and panic by attending routine cognitive behavior therapy.  2. Verbalize healthy, realistic cognitive messages that promote harmony with others, self-acceptance, and confidence.   3. Maintain scheduled appointments with treatment providers, including therapists, primary care providers, specialists, etc.  4. Implement relaxation strategies in her daily routine to combat stress and make chronic pain more manageable.  5. Improve support system. Actively participate in individual and/or group psychotherapy routinely.   6. Complete additional psych testing for diagnostic clarification and treatment planning.      The patient seems reasonably able to adhere to the treatment plan.        SAFETY CONSIDERATIONS:  The patient adamantly denies suicidal ideation or recent episodes of self-harm. She agreed to call the office and/or go to the ER if her psychiatric symptoms worsen. She agreed with the current safety plan.     Follow Up:   Return in about 2 " weeks (around 12/6/2022) for Counseling Session.      Rubi Garduno, PhD, Temp Licensed Psychologist
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
## 2023-02-15 ENCOUNTER — PATIENT ROUNDING (BHMG ONLY) (OUTPATIENT)
Dept: OBSTETRICS AND GYNECOLOGY | Facility: CLINIC | Age: 43
End: 2023-02-15
Payer: MEDICAID

## 2023-02-15 ENCOUNTER — OFFICE VISIT (OUTPATIENT)
Dept: OBSTETRICS AND GYNECOLOGY | Facility: CLINIC | Age: 43
End: 2023-02-15
Payer: MEDICAID

## 2023-02-15 VITALS — DIASTOLIC BLOOD PRESSURE: 68 MMHG | WEIGHT: 161.4 LBS | SYSTOLIC BLOOD PRESSURE: 104 MMHG | BODY MASS INDEX: 24.55 KG/M2

## 2023-02-15 DIAGNOSIS — Z31.9 INFERTILITY MANAGEMENT: ICD-10-CM

## 2023-02-15 DIAGNOSIS — N80.9 ENDOMETRIOSIS: Primary | ICD-10-CM

## 2023-02-15 PROCEDURE — 99203 OFFICE O/P NEW LOW 30 MIN: CPT | Performed by: OBSTETRICS & GYNECOLOGY

## 2023-02-15 RX ORDER — GUAIFENESIN 400 MG/1
400 TABLET ORAL
COMMUNITY

## 2023-02-15 RX ORDER — AZELASTINE HYDROCHLORIDE AND FLUTICASONE PROPIONATE 137; 50 UG/1; UG/1
SPRAY, METERED NASAL
COMMUNITY
Start: 2023-02-10

## 2023-02-15 RX ORDER — NALOXONE HYDROCHLORIDE 4 MG/.1ML
SPRAY NASAL
COMMUNITY
Start: 2022-06-17

## 2023-02-15 RX ORDER — CLINDAMYCIN PHOSPHATE 10 MG/G
GEL TOPICAL
COMMUNITY
Start: 2022-10-17

## 2023-02-15 NOTE — PROGRESS NOTES
February 15, 2023    Hello, may I speak with Sylvia Douglass?    My name is ASHER    I am  with [facility]   [facility] [address]  [address]   [facility] [id number] [phone].    Before we get started may I verify your date of birth? 1980    I am calling to officially welcome you to our practice and ask about your recent visit. Is this a good time to talk? yes    Tell me about your visit with us. What things went well?  EVERYTHING WAS FINE       We're always looking for ways to make our patients' experiences even better. Do you have recommendations on ways we may improve?  no    Overall were you satisfied with your first visit to our practice? yes       I appreciate you taking the time to speak with me today. Is there anything else I can do for you? no      Thank you, and have a great day.

## 2023-02-15 NOTE — PROGRESS NOTES
Chief Complaint   Patient presents with   • Establish Care       Subjective   HPI  Sylvia Douglass is a 42 y.o. female, , who presents for second opinion.     Her last LMP was Patient's last menstrual period was 2023..  Her periods occur every 25-35 days , lasting 6 days. The flow is moderate.. She reports dysmenorrhea is severe, occurring premenstrually and throughout menses, she also has pain during ovulation. Patient reports problems with: pelvic pain and spotting after IC.  Partner Status: Marital Status: single.  New Partners since last visit: no.  Desires STD Screening: no.     Patient states she has seen several other providers trying to get other opinions on her endometriosis, severe pelvic pain, and where she is perimenopausal and trying to conceive. She states previous recommendations were EMB, DX lap, or HSG.     Prior labs reviewed with elevated FSH and low AMH. Discussed findings and I recommend f/u with fertility specialist.     We also discussed her diagnosis of endometriosis and reviewed prior US. Normal findings. Recommend either proceeding with orilissa, Myfembree, or Lupron if she decides to no forgo fertility treatment.     Additional OB/GYN History   Current contraception: contraceptive methods: None  Desires to: do not start contraception    Last Pap : 2019  Last Completed Pap Smear     This patient has no relevant Health Maintenance data.        History of abnormal Pap smear: no  Last mammogram:   Last Completed Mammogram     This patient has no relevant Health Maintenance data.        Tobacco Usage?: No   OB History        0    Para   0    Term   0       0    AB   0    Living   0       SAB   0    IAB   0    Ectopic   0    Molar   0    Multiple   0    Live Births   0                Health Maintenance   Topic Date Due   • Annual Gynecologic Pelvic and Breast Exam  Never done   • ANNUAL PHYSICAL  Never done   • PAP SMEAR  Never done   • TDAP/TD  VACCINES (2 - Tdap) 10/25/2021   • COVID-19 Vaccine (4 - Booster for Pfizer series) 01/14/2022   • INFLUENZA VACCINE  08/01/2022   • HEPATITIS C SCREENING  Completed   • Pneumococcal Vaccine 0-64  Aged Out       The additional following portions of the patient's history were reviewed and updated as appropriate: allergies, current medications, past family history, past medical history, past social history and past surgical history.    Review of Systems   Constitutional: Negative.    HENT: Negative.    Eyes: Negative.    Respiratory: Negative.    Cardiovascular: Negative.    Gastrointestinal: Negative.    Endocrine: Negative.    Genitourinary: Positive for menstrual problem and pelvic pain.   Musculoskeletal: Negative.    Skin: Negative.    Allergic/Immunologic: Negative.    Neurological: Negative.    Hematological: Negative.    Psychiatric/Behavioral: Negative.        I have reviewed and agree with the HPI, ROS, and historical information as entered above. Blade Bragg MD    Objective   /68   Wt 73.2 kg (161 lb 6.4 oz)   LMP 02/08/2023   BMI 24.55 kg/m²     Physical Exam  Vitals reviewed.   Constitutional:       Appearance: Normal appearance.   HENT:      Head: Normocephalic and atraumatic.   Skin:     General: Skin is warm and dry.   Neurological:      Mental Status: She is alert and oriented to person, place, and time.   Psychiatric:         Mood and Affect: Mood normal.         Behavior: Behavior normal.         Assessment & Plan     Assessment     Problem List Items Addressed This Visit        Genitourinary and Reproductive     Endometriosis - Primary    Relevant Orders    Ambulatory Referral to Infertility (Completed)    Infertility management    Relevant Orders    Ambulatory Referral to Infertility (Completed)       1. Endometriosis  2. Infertility    Plan     Return if symptoms worsen or fail to improve, for Referral placed to see Dr. Escalante for fertility.  1. Discussed proceeding with [facility]  agonist or antagonist for treatment of endometriosis if she decides to wait on fertility.       Blade Bragg MD  02/15/2023

## 2023-02-20 RX ORDER — PROMETHAZINE HYDROCHLORIDE 25 MG/1
25 TABLET ORAL EVERY 6 HOURS PRN
Qty: 10 TABLET | Refills: 0 | Status: SHIPPED | OUTPATIENT
Start: 2023-02-20 | End: 2023-02-20 | Stop reason: SDUPTHER

## 2023-02-20 RX ORDER — OXYCODONE HYDROCHLORIDE 5 MG/1
5 TABLET ORAL EVERY 4 HOURS PRN
Qty: 10 TABLET | Refills: 0 | Status: SHIPPED | OUTPATIENT
Start: 2023-02-20

## 2023-02-20 RX ORDER — OXYCODONE HYDROCHLORIDE 5 MG/1
5 TABLET ORAL EVERY 4 HOURS PRN
Qty: 10 TABLET | Refills: 0 | OUTPATIENT
Start: 2023-02-20 | End: 2023-02-20

## 2023-02-20 RX ORDER — PROMETHAZINE HYDROCHLORIDE 25 MG/1
25 TABLET ORAL EVERY 6 HOURS PRN
Qty: 10 TABLET | Refills: 0 | Status: SHIPPED | OUTPATIENT
Start: 2023-02-20

## 2023-04-13 NOTE — PAT
Pre op phone call done today. Home medications not reviewed, patient stated she takes a very long list of home meds & supplements, prefers to review on date of surgery due to limited time today. patient instructed to call surgeon's office if needing to hold any meds prior to surgery and to provide bottles to preop

## 2023-04-23 ENCOUNTER — ANESTHESIA EVENT (OUTPATIENT)
Dept: PERIOP | Facility: HOSPITAL | Age: 43
End: 2023-04-23
Payer: MEDICAID

## 2023-04-23 RX ORDER — FAMOTIDINE 10 MG/ML
20 INJECTION, SOLUTION INTRAVENOUS ONCE
Status: CANCELLED | OUTPATIENT
Start: 2023-04-23 | End: 2023-04-23

## 2023-04-23 RX ORDER — SODIUM CHLORIDE 0.9 % (FLUSH) 0.9 %
10 SYRINGE (ML) INJECTION AS NEEDED
Status: CANCELLED | OUTPATIENT
Start: 2023-04-23

## 2023-04-23 RX ORDER — SODIUM CHLORIDE 0.9 % (FLUSH) 0.9 %
10 SYRINGE (ML) INJECTION EVERY 12 HOURS SCHEDULED
Status: CANCELLED | OUTPATIENT
Start: 2023-04-23

## 2023-04-23 RX ORDER — SODIUM CHLORIDE 9 MG/ML
40 INJECTION, SOLUTION INTRAVENOUS AS NEEDED
Status: CANCELLED | OUTPATIENT
Start: 2023-04-23

## 2023-04-24 ENCOUNTER — ANESTHESIA (OUTPATIENT)
Dept: PERIOP | Facility: HOSPITAL | Age: 43
End: 2023-04-24
Payer: MEDICAID

## 2023-04-24 ENCOUNTER — ANESTHESIA EVENT CONVERTED (OUTPATIENT)
Dept: ANESTHESIOLOGY | Facility: HOSPITAL | Age: 43
End: 2023-04-24
Payer: MEDICAID

## 2023-04-24 ENCOUNTER — HOSPITAL ENCOUNTER (OUTPATIENT)
Facility: HOSPITAL | Age: 43
Setting detail: HOSPITAL OUTPATIENT SURGERY
Discharge: HOME | End: 2023-04-24
Attending: ORTHOPAEDIC SURGERY | Admitting: ORTHOPAEDIC SURGERY
Payer: MEDICAID

## 2023-04-24 ENCOUNTER — APPOINTMENT (OUTPATIENT)
Dept: GENERAL RADIOLOGY | Facility: HOSPITAL | Age: 43
End: 2023-04-24
Payer: MEDICAID

## 2023-04-24 VITALS
OXYGEN SATURATION: 100 % | TEMPERATURE: 97.8 F | RESPIRATION RATE: 14 BRPM | HEART RATE: 68 BPM | HEIGHT: 68 IN | WEIGHT: 145 LBS | SYSTOLIC BLOOD PRESSURE: 140 MMHG | BODY MASS INDEX: 21.98 KG/M2 | DIASTOLIC BLOOD PRESSURE: 91 MMHG

## 2023-04-24 LAB
B-HCG UR QL: NEGATIVE
EXPIRATION DATE: NORMAL
INTERNAL NEGATIVE CONTROL: NEGATIVE
INTERNAL POSITIVE CONTROL: POSITIVE
Lab: NORMAL

## 2023-04-24 PROCEDURE — 25010000002 HYDROMORPHONE 1 MG/ML SOLUTION

## 2023-04-24 PROCEDURE — 81025 URINE PREGNANCY TEST: CPT | Performed by: ORTHOPAEDIC SURGERY

## 2023-04-24 PROCEDURE — 25010000002 CEFAZOLIN IN DEXTROSE 2-4 GM/100ML-% SOLUTION: Performed by: ORTHOPAEDIC SURGERY

## 2023-04-24 PROCEDURE — 25010000002 FENTANYL CITRATE (PF) 50 MCG/ML SOLUTION: Performed by: NURSE ANESTHETIST, CERTIFIED REGISTERED

## 2023-04-24 PROCEDURE — 25010000002 DEXAMETHASONE SODIUM PHOSPHATE 10 MG/ML SOLUTION: Performed by: NURSE ANESTHETIST, CERTIFIED REGISTERED

## 2023-04-24 PROCEDURE — 25510000001 IOPAMIDOL 41 % SOLUTION: Performed by: ORTHOPAEDIC SURGERY

## 2023-04-24 PROCEDURE — 25010000002 FENTANYL CITRATE (PF) 50 MCG/ML SOLUTION

## 2023-04-24 PROCEDURE — 25010000002 PROPOFOL 10 MG/ML EMULSION: Performed by: NURSE ANESTHETIST, CERTIFIED REGISTERED

## 2023-04-24 PROCEDURE — 29999 UNLISTED PX ARTHROSCOPY: CPT | Performed by: PHYSICIAN ASSISTANT

## 2023-04-24 PROCEDURE — 76000 FLUOROSCOPY <1 HR PHYS/QHP: CPT

## 2023-04-24 PROCEDURE — 25010000002 DEXAMETHASONE PER 1 MG: Performed by: NURSE ANESTHETIST, CERTIFIED REGISTERED

## 2023-04-24 PROCEDURE — 25010000002 ONDANSETRON PER 1 MG: Performed by: NURSE ANESTHETIST, CERTIFIED REGISTERED

## 2023-04-24 PROCEDURE — 25010000002 EPINEPHRINE PER 0.1 MG: Performed by: ORTHOPAEDIC SURGERY

## 2023-04-24 PROCEDURE — C1713 ANCHOR/SCREW BN/BN,TIS/BN: HCPCS | Performed by: ORTHOPAEDIC SURGERY

## 2023-04-24 DEVICE — DBM ALLOSYNC PURE 5CC: Type: IMPLANTABLE DEVICE | Site: KNEE | Status: FUNCTIONAL

## 2023-04-24 RX ORDER — THIAMINE HCL 100 MG
TABLET ORAL DAILY
COMMUNITY

## 2023-04-24 RX ORDER — FAMOTIDINE 20 MG/1
20 TABLET, FILM COATED ORAL ONCE
Status: COMPLETED | OUTPATIENT
Start: 2023-04-24 | End: 2023-04-24

## 2023-04-24 RX ORDER — PROPOFOL 10 MG/ML
VIAL (ML) INTRAVENOUS AS NEEDED
Status: DISCONTINUED | OUTPATIENT
Start: 2023-04-24 | End: 2023-04-24 | Stop reason: SURG

## 2023-04-24 RX ORDER — FENTANYL CITRATE 50 UG/ML
INJECTION, SOLUTION INTRAMUSCULAR; INTRAVENOUS
Status: COMPLETED | OUTPATIENT
Start: 2023-04-24 | End: 2023-04-24

## 2023-04-24 RX ORDER — DEXAMETHASONE SODIUM PHOSPHATE 4 MG/ML
INJECTION, SOLUTION INTRA-ARTICULAR; INTRALESIONAL; INTRAMUSCULAR; INTRAVENOUS; SOFT TISSUE AS NEEDED
Status: DISCONTINUED | OUTPATIENT
Start: 2023-04-24 | End: 2023-04-24 | Stop reason: SURG

## 2023-04-24 RX ORDER — SODIUM CHLORIDE 9 MG/ML
INJECTION, SOLUTION INTRAVENOUS AS NEEDED
Status: DISCONTINUED | OUTPATIENT
Start: 2023-04-24 | End: 2023-04-24 | Stop reason: HOSPADM

## 2023-04-24 RX ORDER — ROCURONIUM BROMIDE 10 MG/ML
INJECTION, SOLUTION INTRAVENOUS AS NEEDED
Status: DISCONTINUED | OUTPATIENT
Start: 2023-04-24 | End: 2023-04-24 | Stop reason: SURG

## 2023-04-24 RX ORDER — HALOPERIDOL 5 MG/ML
0.5 INJECTION INTRAMUSCULAR ONCE
Status: DISCONTINUED | OUTPATIENT
Start: 2023-04-24 | End: 2023-04-24 | Stop reason: HOSPADM

## 2023-04-24 RX ORDER — MULTIPLE VITAMINS W/ MINERALS TAB 9MG-400MCG
1 TAB ORAL DAILY
COMMUNITY

## 2023-04-24 RX ORDER — MULTIVIT WITH MINERALS/LUTEIN
1000 TABLET ORAL DAILY
COMMUNITY

## 2023-04-24 RX ORDER — ONDANSETRON 2 MG/ML
INJECTION INTRAMUSCULAR; INTRAVENOUS AS NEEDED
Status: DISCONTINUED | OUTPATIENT
Start: 2023-04-24 | End: 2023-04-24 | Stop reason: SURG

## 2023-04-24 RX ORDER — BUPIVACAINE HYDROCHLORIDE 2.5 MG/ML
INJECTION, SOLUTION EPIDURAL; INFILTRATION; INTRACAUDAL
Status: COMPLETED | OUTPATIENT
Start: 2023-04-24 | End: 2023-04-24

## 2023-04-24 RX ORDER — DEXMEDETOMIDINE HYDROCHLORIDE 100 UG/ML
INJECTION, SOLUTION INTRAVENOUS AS NEEDED
Status: DISCONTINUED | OUTPATIENT
Start: 2023-04-24 | End: 2023-04-24 | Stop reason: SURG

## 2023-04-24 RX ORDER — FENTANYL CITRATE 50 UG/ML
INJECTION, SOLUTION INTRAMUSCULAR; INTRAVENOUS
Status: COMPLETED
Start: 2023-04-24 | End: 2023-04-24

## 2023-04-24 RX ORDER — FENTANYL CITRATE 50 UG/ML
50 INJECTION, SOLUTION INTRAMUSCULAR; INTRAVENOUS
Status: DISCONTINUED | OUTPATIENT
Start: 2023-04-24 | End: 2023-04-24 | Stop reason: HOSPADM

## 2023-04-24 RX ORDER — MIDAZOLAM HYDROCHLORIDE 1 MG/ML
1 INJECTION INTRAMUSCULAR; INTRAVENOUS
Status: DISCONTINUED | OUTPATIENT
Start: 2023-04-24 | End: 2023-04-24 | Stop reason: HOSPADM

## 2023-04-24 RX ORDER — LIDOCAINE HYDROCHLORIDE 10 MG/ML
INJECTION, SOLUTION EPIDURAL; INFILTRATION; INTRACAUDAL; PERINEURAL AS NEEDED
Status: DISCONTINUED | OUTPATIENT
Start: 2023-04-24 | End: 2023-04-24 | Stop reason: SURG

## 2023-04-24 RX ORDER — MAGNESIUM HYDROXIDE 1200 MG/15ML
LIQUID ORAL AS NEEDED
Status: DISCONTINUED | OUTPATIENT
Start: 2023-04-24 | End: 2023-04-24 | Stop reason: HOSPADM

## 2023-04-24 RX ORDER — SUCCINYLCHOLINE/SOD CL,ISO/PF 200MG/10ML
SYRINGE (ML) INTRAVENOUS AS NEEDED
Status: DISCONTINUED | OUTPATIENT
Start: 2023-04-24 | End: 2023-04-24 | Stop reason: SURG

## 2023-04-24 RX ORDER — LIDOCAINE HYDROCHLORIDE 10 MG/ML
0.5 INJECTION, SOLUTION EPIDURAL; INFILTRATION; INTRACAUDAL; PERINEURAL ONCE AS NEEDED
Status: COMPLETED | OUTPATIENT
Start: 2023-04-24 | End: 2023-04-24

## 2023-04-24 RX ORDER — SUCRALFATE 1 G/1
1 TABLET ORAL 4 TIMES DAILY
COMMUNITY

## 2023-04-24 RX ORDER — SODIUM CHLORIDE, SODIUM LACTATE, POTASSIUM CHLORIDE, CALCIUM CHLORIDE 600; 310; 30; 20 MG/100ML; MG/100ML; MG/100ML; MG/100ML
9 INJECTION, SOLUTION INTRAVENOUS CONTINUOUS
Status: DISCONTINUED | OUTPATIENT
Start: 2023-04-24 | End: 2023-04-24 | Stop reason: HOSPADM

## 2023-04-24 RX ORDER — PRASTERONE (DHEA) 25 MG
CAPSULE ORAL
COMMUNITY

## 2023-04-24 RX ORDER — DEXAMETHASONE SODIUM PHOSPHATE 10 MG/ML
INJECTION, SOLUTION INTRAMUSCULAR; INTRAVENOUS
Status: COMPLETED | OUTPATIENT
Start: 2023-04-24 | End: 2023-04-24

## 2023-04-24 RX ORDER — CEFAZOLIN SODIUM 2 G/100ML
2 INJECTION, SOLUTION INTRAVENOUS ONCE
Status: COMPLETED | OUTPATIENT
Start: 2023-04-24 | End: 2023-04-24

## 2023-04-24 RX ADMIN — FENTANYL CITRATE 50 MCG: 50 INJECTION, SOLUTION INTRAMUSCULAR; INTRAVENOUS at 12:03

## 2023-04-24 RX ADMIN — Medication 0.5 MG: at 11:42

## 2023-04-24 RX ADMIN — ROCURONIUM BROMIDE 5 MG: 10 SOLUTION INTRAVENOUS at 10:31

## 2023-04-24 RX ADMIN — Medication 0.5 MG: at 12:20

## 2023-04-24 RX ADMIN — BUPIVACAINE HYDROCHLORIDE 30 ML: 2.5 INJECTION, SOLUTION EPIDURAL; INFILTRATION; INTRACAUDAL; PERINEURAL at 10:14

## 2023-04-24 RX ADMIN — LIDOCAINE HYDROCHLORIDE 50 MG: 10 INJECTION, SOLUTION EPIDURAL; INFILTRATION; INTRACAUDAL; PERINEURAL at 10:31

## 2023-04-24 RX ADMIN — CEFAZOLIN SODIUM 2 G: 2 INJECTION, SOLUTION INTRAVENOUS at 10:26

## 2023-04-24 RX ADMIN — SODIUM CHLORIDE, POTASSIUM CHLORIDE, SODIUM LACTATE AND CALCIUM CHLORIDE 9 ML/HR: 600; 310; 30; 20 INJECTION, SOLUTION INTRAVENOUS at 09:20

## 2023-04-24 RX ADMIN — HYDROMORPHONE HYDROCHLORIDE 0.5 MG: 1 INJECTION, SOLUTION INTRAMUSCULAR; INTRAVENOUS; SUBCUTANEOUS at 11:42

## 2023-04-24 RX ADMIN — FENTANYL CITRATE 50 MCG: 50 INJECTION, SOLUTION INTRAMUSCULAR; INTRAVENOUS at 12:28

## 2023-04-24 RX ADMIN — FENTANYL CITRATE 50 MCG: 50 INJECTION, SOLUTION INTRAMUSCULAR; INTRAVENOUS at 11:45

## 2023-04-24 RX ADMIN — Medication 0.5 MG: at 11:58

## 2023-04-24 RX ADMIN — LIDOCAINE HYDROCHLORIDE 0.2 ML: 10 INJECTION, SOLUTION EPIDURAL; INFILTRATION; INTRACAUDAL; PERINEURAL at 09:20

## 2023-04-24 RX ADMIN — FAMOTIDINE 20 MG: 20 TABLET, FILM COATED ORAL at 09:56

## 2023-04-24 RX ADMIN — FENTANYL CITRATE 100 MCG: 50 INJECTION, SOLUTION INTRAMUSCULAR; INTRAVENOUS at 10:14

## 2023-04-24 RX ADMIN — DEXMEDETOMIDINE HYDROCHLORIDE 4 MCG: 100 INJECTION, SOLUTION INTRAVENOUS at 11:30

## 2023-04-24 RX ADMIN — DEXAMETHASONE SODIUM PHOSPHATE 8 MG: 4 INJECTION, SOLUTION INTRAMUSCULAR; INTRAVENOUS at 10:34

## 2023-04-24 RX ADMIN — PROPOFOL 150 MG: 10 INJECTION, EMULSION INTRAVENOUS at 10:31

## 2023-04-24 RX ADMIN — ONDANSETRON 4 MG: 2 INJECTION INTRAMUSCULAR; INTRAVENOUS at 11:31

## 2023-04-24 RX ADMIN — DEXAMETHASONE SODIUM PHOSPHATE 2 MG: 10 INJECTION, SOLUTION INTRAMUSCULAR; INTRAVENOUS at 10:14

## 2023-04-24 RX ADMIN — DEXMEDETOMIDINE HYDROCHLORIDE 4 MCG: 100 INJECTION, SOLUTION INTRAVENOUS at 11:42

## 2023-04-24 RX ADMIN — HYDROMORPHONE HYDROCHLORIDE 0.5 MG: 1 INJECTION, SOLUTION INTRAMUSCULAR; INTRAVENOUS; SUBCUTANEOUS at 12:20

## 2023-04-24 RX ADMIN — Medication 160 MG: at 10:32

## 2023-04-24 RX ADMIN — HYDROMORPHONE HYDROCHLORIDE 0.5 MG: 1 INJECTION, SOLUTION INTRAMUSCULAR; INTRAVENOUS; SUBCUTANEOUS at 11:58

## 2023-04-24 NOTE — ANESTHESIA PROCEDURE NOTES
SS adductor canal      Patient reassessed immediately prior to procedure    Reason for block: at surgeon's request and post-op pain management  Performed by  CRNA/CAA: Juan Moscoso CRNA  Assisted by: Niki Martin RN  Preanesthetic Checklist  Completed: patient identified, IV checked, site marked, risks and benefits discussed, surgical consent, monitors and equipment checked, pre-op evaluation and timeout performed  Prep:  Pt Position: supine  Sterile barriers:cap, gloves, mask, sterile barriers and washed/disinfected hands  Prep: ChloraPrep  Patient monitoring: blood pressure monitoring, continuous pulse oximetry and EKG  Procedure  Performed under: spinal  Guidance:ultrasound guided    ULTRASOUND INTERPRETATION.  Using ultrasound guidance a 20 G gauge needle was placed in close proximity to the nerve, at which point, under ultrasound guidance anesthetic was injected in the area of the nerve and spread of the anesthesia was seen on ultrasound in close proximity thereto.  There were no abnormalities seen on ultrasound; a digital image was taken; and the patient tolerated the procedure with no complications. Images:still images obtained, printed/placed on chart    Block Type:adductor canal block  Injection Technique:single-shot  Needle Type:Tuohy and echogenic  Needle Gauge:18 G  Resistance on Injection: none  Catheter Size:20 G (20g)    Medications Used: fentaNYL citrate (PF) (SUBLIMAZE) injection - Intravenous   100 mcg - 4/24/2023 10:14:00 AM  bupivacaine PF (MARCAINE) 0.25 % injection - Injection   30 mL - 4/24/2023 10:14:00 AM  dexamethasone sodium phosphate injection - Injection   2 mg - 4/24/2023 10:14:00 AM      Post Assessment  Injection Assessment: negative aspiration for heme, incremental injection and no paresthesia on injection  Patient Tolerance:comfortable throughout block  Complications:no  Additional Notes  SINGLE shot   A high-frequency linear transducer, with sterile cover, was placed on the  "anterior mid-thigh (between the anterior superior iliac spine and patella). The transducer was then moved medially to identify the Sartorius muscle (Howie), Vastus Medialis muscle (VMM), Superficial Femoral Artery (SFA) and Vein. The transducer was then moved cephalad or caudad to position the SFA in the middle of the Howie. The insertion site was prepped and draped in sterile fashion. Skin and cutaneous tissue was infiltrated with 2-5 ml of 1% Lidocaine. Using ultrasound-guidance, a 20-gauge B-Garcia 4\" Ultraplex 360 non-stimulating echogenic needle was advanced in plane from lateral to medial. Preservative-free normal saline was utilized for hydro-dissection of tissue, advancement of needle, and to confirm needle placement below the fascial plane of the Howie where the Nerve to the VMM is located. Local anesthetic (LA) 5 ml deposited here. The needle continues its path lateral to the SFA at the level of the Saphenous Nerve. The remainder of the LA was deposited at the 10-11 o'clock position of the SFA. This injection created a space between the Howie and the SFA. Aspiration every 5 ml to prevent intravascular injection. Injection was completed with negative aspiration of blood and negative intravascular injection. Injection pressures were normal with minimal resistance.           "

## 2023-04-24 NOTE — ANESTHESIA POSTPROCEDURE EVALUATION
Patient: Sylvia Douglass    Procedure Summary     Date: 04/24/23 Room / [facility]:  [facility]    Anesthesia Start: 1026 Anesthesia Stop: 1143    Procedure: KNEE ARTHROSCOPY WITH INTERNAL FIXATION OF TIBIAL PLATEAU INSUFFICIENCY FRACTURE, MEDIAL AND LATERAL FEMORAL CONDYE CHONDROPLASTY - RIGHT (Right: Knee) Diagnosis:     Surgeons: Chris Kidd MD Provider: Niki Luna DO    Anesthesia Type: general with block ASA Status: 2          Anesthesia Type: general with block    Vitals  Vitals Value Taken Time   BP     Temp     Pulse 93 04/24/23 1142   Resp     SpO2 100 % 04/24/23 1142   Vitals shown include unvalidated device data.        Post Anesthesia Care and Evaluation    Patient location during evaluation: PACU  Patient participation: complete - patient participated  Level of consciousness: awake and alert  Pain management: adequate    Airway patency: patent  Anesthetic complications: No anesthetic complications  PONV Status: none  Cardiovascular status: hemodynamically stable and acceptable  Respiratory status: nonlabored ventilation, acceptable, nasal cannula and airway suctioned  Hydration status: acceptable    Comments: To PACU on O2NC, breathing comfortably.  Report to PACU RN at bedside. VSS.

## 2023-04-24 NOTE — ANESTHESIA PREPROCEDURE EVALUATION
Anesthesia Evaluation     Patient summary reviewed and Nursing notes reviewed   history of anesthetic complications:  NPO Solid Status: > 6 hours  NPO Liquid Status: > 2 hours           Airway   Mallampati: II  TM distance: >3 FB  Neck ROM: full  No difficulty expected  Dental - normal exam     Pulmonary - normal exam   (-) not a smoker  Cardiovascular - normal exam  Exercise tolerance: good (4-7 METS)    (-) valvular problems/murmurs      Neuro/Psych  (+) headaches, psychiatric history,      ROS Comment: meningioma  GI/Hepatic/Renal/Endo    (+)  GERD poorly controlled,  renal disease,     Musculoskeletal     (+) neck pain (intermittent numbness/tingling in UE, followed by pain mgmt),   Abdominal    Substance History - negative use     OB/GYN          Other        ROS/Med Hx Other: Cracker 0500  R en Y  2004; persistent esophageal stricture requiring serial dilations  hgb   13.9  k 3.9 on last bmp 12/2022                  Anesthesia Plan    ASA 2     general with block   Rapid sequence  (Risks and benefits of general anesthesia discussed with patient (including MI, CVA, death, recall, aspiration, oropharyngeal/dental damage), questions answered, agreeable to proceed.   Benefits and risks of ACB nerve block/catheter discussed with patient ((including failed block, damage to nerve/nearby structures, intravascular injection)); questions answered, agreeable to proceed.      RSI  / VL  For hx of neck injury/pain)  intravenous induction     Anesthetic plan, risks, benefits, and alternatives have been provided, discussed and informed consent has been obtained with: patient.    Plan discussed with CRNA.        CODE STATUS:

## 2023-04-24 NOTE — ANESTHESIA PROCEDURE NOTES
Airway  Urgency: elective    Date/Time: 4/24/2023 10:33 AM  Airway not difficult    General Information and Staff    Patient location during procedure: OR  CRNA/CAA: Lolis Blanchard CRNA    Indications and Patient Condition  Indications for airway management: airway protection    Preoxygenated: yes  MILS not maintained throughout  Mask difficulty assessment: 1 - vent by mask    Final Airway Details  Final airway type: endotracheal airway      Successful airway: ETT  Cuffed: yes   Successful intubation technique: direct laryngoscopy and RSI  Endotracheal tube insertion site: oral  Blade: Alexandrea  Blade size: 3  ETT size (mm): 7.0  Cormack-Lehane Classification: grade I - full view of glottis  Placement verified by: chest auscultation and capnometry   Cuff volume (mL): 5  Measured from: lips  ETT/EBT  to lips (cm): 20  Number of attempts at approach: 1  Assessment: lips, teeth, and gum same as pre-op and atraumatic intubation    Additional Comments  Negative epigastric sounds, Breath sound equal bilaterally with symmetric chest rise and fall

## 2023-04-24 NOTE — OP NOTE
KNEE ARTHROSCOPY  Procedure Report    Patient Name:  Sylvia Douglass  YOB: 1980    Date of Surgery:  4/24/2023     Indications: 42-year-old with longstanding history of right knee pain this been refractory to conservative measures.  Treatment options were discussed at length including continued conservative treatment versus operative intervention.  Please see my office notes for further details.  Risks and benefits of surgery were discussed including but not limited to bleeding, infection, injury to surrounding structures such as blood vessels tendons and nerves. We discussed the possibility of surgical procedure failure, persistent pain, loss of motion, persistent stiffness, persistent weakness, and the need for a revision surgery. In addition, we discussed the risk of heart attack, stroke, or death.    Pre-op Diagnosis:      Right knee tibial plateau cyst with insufficiency fracture     Post-op Diagnosis:       Right knee tibial plateau cyst with insufficiency fracture, medial femoral condyle and lateral tibial plateau chondral injury    Procedure/CPT® Codes:      Procedure(s):  KNEE ARTHROSCOPY WITH INTERNAL FIXATION OF TIBIAL PLATEAU INSUFFICIENCY FRACTURE and cyst, MEDIAL FEMORAL CONDYE CHONDROPLASTY lateral tibial plateau chondroplasty- RIGHT    Staff:  Surgeon(s):  Chris Kidd MD    Circulator: Farrah Browne RN; Delta Champagne RN  Radiology Technologist: Doris Frye R.T.(R)  Scrub Person: Jacki Khan  Vendor Representative: Stiven Pineda  Nursing Assistant: Kelley Elena PCT  Assistant: Carlos Alberto Harrell PA-C     Assistant: Carlos Alberto Harrell PA-C  Indication for surgical assistant:  Surgical assistant was indicated for assistance with patient positioning as well as critical portions of the procedure including soft tissue retraction, holding the camera,  placement of implants/hardware, and closure of wounds.    Anesthesia: General with Block    Estimated Blood  Loss: minimal    Implants:    Implant Name Type Inv. Item Serial No.  Lot No. LRB No. Used Action   DBM ALLOSYNC PURE 5CC - [id number] Implant DBM ALLOSYNC PURE 5CC  ARTHREX [id number] Right 1 Implanted       Specimen:                None      Complications: None apparent    Description of Procedure:     Patient was identified in the preoperative holding area and the right knee was marked.  Patient was transported to the OR and place supine on the operative table.  General anesthesia was induced.   Examination of the knee showed no ligamentous instability.  The right knee was prepped and draped in the usual sterile fashion.  Preoperative time out was performed indicating correct patient, correct side, correct procedure, and that preoperative antibiotic had been given.    I began with the internal fixation of the tibial plateau.  C-arm guidance was utilized.  Based on intraoperative pictures as well as MRI evaluation preoperatively I did place a Jamshidi cannula into the area of the cyst and surrounding insufficiency fracture.  Once the appropriate trajectory was confirmed in both the AP and lateral planes a flip cutter was used to debride the area of the cyst and insufficiency fracture.  Whole blood was then mixed with DBM and injected locally in this area to fill the defect and provide a biologic augment.  Once complete the Jamshidi needle was removed and final x-rays were obtained which showed good positioning of the injected mix.  Next an Esmarch was used to exsanguinate the extremity and the tourniquet was inflated to 250mmHg.  Standard anterior lateral and anteromedial portals were created.  The scope was placed in the anterolateral portal.  Diagnostic exam of the medial compartment showed focal grade 3 changes over the medial femoral condyle along the weightbearing surface more on the lateral side.  This was debrided back to a stable rim of cartilage tissue using a arthroscopic shaver.  Good  vertical walls were obtained.  This was then measured and found to be approximately 6 mm in superior to inferior distance and 11 mm medial to lateral..  The medial meniscus showed no evidence of tearing.   Within the notch the ACL and PCL were noted to be intact.  In the lateral compartment there was noted to be focal grade 2 changes on the posterior tibial plateau in the area of the underlying cyst, any loose flaps of tissue off the tibial plateau side were debrided using a shaver.  The lateral meniscus was noted to have some mild degenerative change and tearing centrally and posteriorly which was gently debrided using a shaver.    The scope was then driven into the patellofemoral joint where there was noted to be no significant articular cartilage injury.  There were no loose bodies noted in the suprapatellar pouch or medial or lateral gutters.  Once this was complete the knee was drained of excess fluid and the scope was removed from the knee.  The leg was then washed and dressed.  Patient was then awoken and taken to the recovery room in stable condition.    Disposition:   Patient will be weightbearing as tolerated to the right lower extremity with an assist device as needed.  she will start physical therapy as soon as possible.  I will plan on seeing the patient back in 10-14 days for first postoperative check and removal of sutures at that point.    Chris Kidd MD     Date: 4/24/2023  Time: 11:52 EDT
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
## 2023-06-08 ENCOUNTER — TELEMEDICINE (OUTPATIENT)
Dept: PSYCHIATRY | Facility: CLINIC | Age: 43
End: 2023-06-08
Payer: MEDICAID

## 2023-06-08 DIAGNOSIS — M54.9 CHRONIC NECK AND BACK PAIN: ICD-10-CM

## 2023-06-08 DIAGNOSIS — M54.2 CHRONIC NECK AND BACK PAIN: ICD-10-CM

## 2023-06-08 DIAGNOSIS — Z13.39 ADHD (ATTENTION DEFICIT HYPERACTIVITY DISORDER) EVALUATION: ICD-10-CM

## 2023-06-08 DIAGNOSIS — Z86.69 HISTORY OF MIGRAINE WITH AURA: ICD-10-CM

## 2023-06-08 DIAGNOSIS — G89.29 CHRONIC NECK AND BACK PAIN: ICD-10-CM

## 2023-06-08 DIAGNOSIS — F43.10 POST TRAUMATIC STRESS DISORDER (PTSD): Primary | ICD-10-CM

## 2023-06-08 PROCEDURE — 90837 PSYTX W PT 60 MINUTES: CPT | Performed by: STUDENT IN AN ORGANIZED HEALTH CARE EDUCATION/TRAINING PROGRAM

## 2023-06-16 ENCOUNTER — TELEMEDICINE (OUTPATIENT)
Dept: PSYCHIATRY | Facility: CLINIC | Age: 43
End: 2023-06-16
Payer: MEDICAID

## 2023-06-16 DIAGNOSIS — F90.9 ATTENTION DEFICIT HYPERACTIVITY DISORDER (ADHD), UNSPECIFIED ADHD TYPE: Primary | ICD-10-CM

## 2023-06-16 DIAGNOSIS — G43.719 INTRACTABLE CHRONIC MIGRAINE WITHOUT AURA AND WITHOUT STATUS MIGRAINOSUS: ICD-10-CM

## 2023-06-16 DIAGNOSIS — M54.9 CHRONIC NECK AND BACK PAIN: ICD-10-CM

## 2023-06-16 DIAGNOSIS — G89.29 CHRONIC NECK AND BACK PAIN: ICD-10-CM

## 2023-06-16 DIAGNOSIS — F43.10 POST TRAUMATIC STRESS DISORDER (PTSD): ICD-10-CM

## 2023-06-16 DIAGNOSIS — M54.2 CHRONIC NECK AND BACK PAIN: ICD-10-CM

## 2023-08-03 ENCOUNTER — TELEMEDICINE (OUTPATIENT)
Dept: PSYCHIATRY | Facility: CLINIC | Age: 43
End: 2023-08-03
Payer: COMMERCIAL

## 2023-08-03 DIAGNOSIS — M54.9 CHRONIC NECK AND BACK PAIN: ICD-10-CM

## 2023-08-03 DIAGNOSIS — M54.2 CHRONIC NECK AND BACK PAIN: ICD-10-CM

## 2023-08-03 DIAGNOSIS — F90.8 ATTENTION DEFICIT HYPERACTIVITY DISORDER (ADHD), OTHER TYPE: Primary | ICD-10-CM

## 2023-08-03 DIAGNOSIS — Z86.69 HISTORY OF MIGRAINE WITH AURA: ICD-10-CM

## 2023-08-03 DIAGNOSIS — F43.9 STRESS AT HOME: ICD-10-CM

## 2023-08-03 DIAGNOSIS — G89.29 CHRONIC NECK AND BACK PAIN: ICD-10-CM

## 2023-08-03 DIAGNOSIS — F43.10 POST TRAUMATIC STRESS DISORDER (PTSD): ICD-10-CM

## 2023-08-11 ENCOUNTER — TELEMEDICINE (OUTPATIENT)
Dept: PSYCHIATRY | Facility: CLINIC | Age: 43
End: 2023-08-11
Payer: COMMERCIAL

## 2023-08-11 DIAGNOSIS — M54.9 CHRONIC NECK AND BACK PAIN: ICD-10-CM

## 2023-08-11 DIAGNOSIS — F90.8 ATTENTION DEFICIT HYPERACTIVITY DISORDER (ADHD), OTHER TYPE: ICD-10-CM

## 2023-08-11 DIAGNOSIS — G43.909 ACUTE MIGRAINE: ICD-10-CM

## 2023-08-11 DIAGNOSIS — M54.2 CHRONIC NECK AND BACK PAIN: ICD-10-CM

## 2023-08-11 DIAGNOSIS — G89.29 CHRONIC NECK AND BACK PAIN: ICD-10-CM

## 2023-08-11 DIAGNOSIS — F16.283 FLASHBACKS: ICD-10-CM

## 2023-08-11 DIAGNOSIS — F43.10 POST TRAUMATIC STRESS DISORDER (PTSD): Primary | ICD-10-CM

## 2023-08-15 ENCOUNTER — TELEMEDICINE (OUTPATIENT)
Dept: PSYCHIATRY | Facility: CLINIC | Age: 43
End: 2023-08-15
Payer: COMMERCIAL

## 2023-08-15 DIAGNOSIS — F43.9 STRESS AT HOME: ICD-10-CM

## 2023-08-15 DIAGNOSIS — Z86.69 HISTORY OF MIGRAINE WITH AURA: ICD-10-CM

## 2023-08-15 DIAGNOSIS — F43.10 POST TRAUMATIC STRESS DISORDER (PTSD): Primary | ICD-10-CM

## 2023-08-15 DIAGNOSIS — M54.2 CHRONIC NECK AND BACK PAIN: ICD-10-CM

## 2023-08-15 DIAGNOSIS — F90.8 ATTENTION DEFICIT HYPERACTIVITY DISORDER (ADHD), OTHER TYPE: ICD-10-CM

## 2023-08-15 DIAGNOSIS — M54.9 CHRONIC NECK AND BACK PAIN: ICD-10-CM

## 2023-08-15 DIAGNOSIS — G89.29 CHRONIC NECK AND BACK PAIN: ICD-10-CM

## 2023-08-21 ENCOUNTER — TELEMEDICINE (OUTPATIENT)
Dept: PSYCHIATRY | Facility: CLINIC | Age: 43
End: 2023-08-21
Payer: COMMERCIAL

## 2023-08-21 DIAGNOSIS — F43.9 STRESS AT HOME: ICD-10-CM

## 2023-08-21 DIAGNOSIS — G89.29 CHRONIC NECK AND BACK PAIN: ICD-10-CM

## 2023-08-21 DIAGNOSIS — M54.2 CHRONIC NECK AND BACK PAIN: ICD-10-CM

## 2023-08-21 DIAGNOSIS — M54.9 CHRONIC NECK AND BACK PAIN: ICD-10-CM

## 2023-08-21 DIAGNOSIS — Z86.69 HISTORY OF MIGRAINE WITH AURA: ICD-10-CM

## 2023-08-21 DIAGNOSIS — F43.10 POST TRAUMATIC STRESS DISORDER (PTSD): Primary | ICD-10-CM

## 2023-08-21 DIAGNOSIS — F90.8 ATTENTION DEFICIT HYPERACTIVITY DISORDER (ADHD), OTHER TYPE: ICD-10-CM

## 2023-08-21 NOTE — PROGRESS NOTES
Video Visit      Patient Name: Sylvia Douglass  : 1980   MRN: [id number]   Start: 9:04 Stop: 10:12  Referring Physician: Lisset Velásquez MD    Chief Complaint:      ICD-10-CM ICD-9-CM   1. Post traumatic stress disorder (PTSD)  F43.10 309.81   2. ADHD (attention deficit hyperactivity disorder) evaluation  Z13.39 V79.8   3. Chronic neck and back pain  M54.2 723.1    M54.9 724.5    G89.29 338.29   4. History of migraine with aura  Z86.69 V12.49        This provider is located at [facility], using a secure "[employer]t Video Visit through [employer]. Sylvia Douglass is being seen remotely via telehealth at their home address in [place], and stated they are in a secure environment for this session. The patient's condition being diagnosed/treated is appropriate for telemedicine. I Rubi Garduno, PhD identified myself as well as my credentials.   The patient, and/or patients guardian, consent to be seen remotely, and when consent is given they understand that the consent allows for patient identifiable information to be sent to a third party as needed.   They may refuse to be seen remotely at any time. The electronic data is encrypted and password protected, and the patient and/or guardian has been advised of the potential risks to privacy not withstanding such measures.     You have chosen to receive care through a telehealth visit.  Do you consent to use a video/audio connection for your medical care today? YES  This visit complies with patient safety concerns in accordance with [facility] recommendations.    History of Present Illness: Sylvia Douglass is a 43 y.o. female who I spoke with on video visit today for follow up care navigation and supportive psychotherapy. Ms. Douglass has a history of an asymptomatic vertex meningioma, migraine headaches, and neck pain. She has a psychiatric history of trauma, severe anxiety, and depression.  Past providers have included her therapist, Sulma Carpenter LCSW, at a  clinic for several years. Additionally, she participates in relationship counseling with BAILEY Santos.       Subjective   Review of Systems:   Review of Systems    Patient History:   The following portions of the patient's history were reviewed and updated as appropriate: allergies, current medications, past family history, past medical history, past social history, past surgical history and problem list.     Social:  Sylvia reports that she continues to struggle with completing tasks once starting. Rather than breaking down tasks to make them more manageable, she notes a habit of avoiding things.     Medications:     Current Outpatient Medications:     Alpha-Lipoic Acid (LIPOIC ACID PO), Take 300 mg by mouth., Disp: , Rfl:     ALPRAZolam (XANAX) 0.5 MG tablet, Take 1 tablet by mouth., Disp: , Rfl:     Ascorbic Acid (Vitamin C ER) 1000 MG tablet controlled-release, Take  by mouth., Disp: , Rfl:     bimatoprost (LATISSE) 0.03 % ophthalmic solution, Latisse 0.03 % eyelash drops  APPLY 1 DROP TO APPLICATOR AND APPLY TO UPPER EYELID, ALONG EYELASHES, BY TOPICAL ROUTE ONCE DAILY AT NIGHTTIME, Disp: , Rfl:     BIOTIN PO, biotin, Disp: , Rfl:     butalbital-acetaminophen-caffeine (FIORICET, ESGIC) -40 MG per tablet, , Disp: , Rfl:     Calcium Citrate-Vitamin D3 (CITRACAL) 315-6.25 MG-MCG tablet tablet, Take  by mouth Daily., Disp: , Rfl:     clindamycin (CLINDAGEL) 1 % gel, clindamycin 1 % topical gel  APPLY A THIN LAYER TO AREA OF BREAKOUTS ON FACE TWO TIMES A DAY, Disp: , Rfl:     Cyanocobalamin (Vitamin B 12) 100 MCG lozenge, Take 5,000 mcg by mouth., Disp: , Rfl:     cyclobenzaprine (FLEXERIL) 10 MG tablet, Take 1 tablet by mouth 3 (Three) Times a Day., Disp: 15 tablet, Rfl: 0    DHEA 25 MG capsule, Take  by mouth., Disp: , Rfl:     Dymista 137-50 MCG/ACT suspension, , Disp: , Rfl:     EPINEPHrine (EPIPEN) 0.3 MG/0.3ML solution  auto-injector injection, , Disp: , Rfl:     famotidine (PEPCID) 20 MG tablet, , Disp: , Rfl:     guaiFENesin (HUMIBID 3) 400 MG tablet, Take 1 tablet by mouth 6 (Six) Times a Day., Disp: , Rfl:     HYDROcodone-acetaminophen (NORCO) 7.5-325 MG per tablet, , Disp: , Rfl:     IRON, FERROUS SULFATE, PO, Take 60 mg by mouth., Disp: , Rfl:     l-methylfolate calcium (DEPLIN) 7.5 MG tablet tablet, Take 1 tablet by mouth Daily., Disp: , Rfl:     Loratadine 10 MG capsule, loratadine, Disp: , Rfl:     MAGNESIUM GLYCINATE PO, Take 200 mg by mouth., Disp: , Rfl:     Melatonin 1 MG capsule, Take 4 mg by mouth., Disp: , Rfl:     multivitamin with minerals (MULTIVITAMIN ADULT PO), Take 1 tablet by mouth Daily., Disp: , Rfl:     naloxone (NARCAN) 4 MG/0.1ML nasal spray, SMARTSIG:Both Nares, Disp: , Rfl:     NON FORMULARY, 1,000 mg. M-acetylcysteine, Disp: , Rfl:     Nutritional Supplements (VITAMIN D BOOSTER PO), Vitamin D2, Disp: , Rfl:     oxyCODONE (Roxicodone) 5 MG immediate release tablet, Take 1 tablet by mouth Every 4 (Four) Hours As Needed for Moderate Pain., Disp: 10 tablet, Rfl: 0    promethazine (PHENERGAN) 25 MG tablet, Take 1 tablet by mouth Every 6 (Six) Hours As Needed for Nausea or Vomiting., Disp: 10 tablet, Rfl: 0    pseudoephedrine (SUDAFED) 120 MG 12 hr tablet, Every 12 (Twelve) Hours., Disp: , Rfl:     rizatriptan (MAXALT) 10 MG tablet, , Disp: , Rfl:     sucralfate (CARAFATE) 1 g tablet, Take 1 tablet by mouth 4 (Four) Times a Day., Disp: , Rfl:     UBIQUINOL PO, Take 200 mg by mouth., Disp: , Rfl:     vitamin E 1000 UNIT capsule, Take 1 capsule by mouth Daily., Disp: , Rfl:     Zinc 23 MG lozenge, Dissolve 22 mg in the mouth., Disp: , Rfl:     Objective     Mental Status Exam:   Appearance: Relaxed  Hygiene: Appeared Normal  Cooperation: Cooperative  Eye Contact: Fleeting  Psychomotor Behavior: Unremarkable   Mood: Anxious  Affect: Appropriate  Speech: Fast  Thought Process: Normal   Thought Content:  Anxiety-provoking  Suicidal: No SI indicated  Homicidal: No HI indicated  Hallucinations: None reported   Delusion: None reported  Memory: Grossly intact  Orientation: Orientated x3  Reliability: Good  Insight: Fair  Judgement: Good  Impulse Control: Impulsive     Assessment / Plan      Visit Diagnosis/Orders Placed This Visit:  Diagnoses and all orders for this visit:    1. Post traumatic stress disorder (PTSD) (Primary)    2. ADHD (attention deficit hyperactivity disorder) evaluation    3. Chronic neck and back pain    4. History of migraine with aura      Differential:   ADHD    Today's session focused on challenging anxious thinking related to ADHD. Initially, praised the patient for increased self-awareness and completing her ADHD assessment. Discussed a plan for her to consult with her prescribing providers about medications that may better help her to manage symptoms. Analyzed potential challenges (e.g., patient's reported sensitivity to medications) and offered ways to cope with these challenges (e.g., results of genetic testing to make informed decisions related to meds).      Reiterated the importance of making behavioral changes to help target specific symptoms. For example, she was recommended to create a short-list of goals that are time-focused and achievable (cleaning one room vs. cleaning the entire house). She was advised to not start another task until one is marked off the list.     Plan:   1. Maintain stable mood. Ameliorate or decrease the frequency and severity of depression, anxiety, panic, and ADHD by attending routine psychotherapy sessions.  2. Verbalize healthy, realistic cognitive messages that promote harmony with others, self-acceptance, and confidence.   3. Maintain scheduled appointments with treatment providers, including therapists, primary care providers, specialists, etc.  4. Implement relaxation strategies in her daily routine to combat stress and make chronic pain more  manageable.  5. Continue trauma-focused therapy. Decrease the frequency of trauma-related flash backs.      The patient seems reasonably able to adhere to the treatment plan.        SAFETY CONSIDERATIONS:  The patient adamantly denies suicidal ideation or recent episodes of self-harm. She agreed to call [phone], and/or go to the ER if her psychiatric symptoms worsen. She agreed with the current safety plan.     Quality Measures:   Never smoker    Follow Up:   Return in about 1 week (around 6/15/2023) for Counseling Session.      Rubi Garduno, PhD, Temp Licensed Psychologist

## 2023-08-23 ENCOUNTER — TELEMEDICINE (OUTPATIENT)
Dept: PSYCHIATRY | Facility: CLINIC | Age: 43
End: 2023-08-23
Payer: COMMERCIAL

## 2023-08-23 DIAGNOSIS — M54.2 CHRONIC NECK AND BACK PAIN: ICD-10-CM

## 2023-08-23 DIAGNOSIS — Z86.69 HISTORY OF MIGRAINE WITH AURA: ICD-10-CM

## 2023-08-23 DIAGNOSIS — G89.29 CHRONIC NECK AND BACK PAIN: ICD-10-CM

## 2023-08-23 DIAGNOSIS — F90.8 ATTENTION DEFICIT HYPERACTIVITY DISORDER (ADHD), OTHER TYPE: ICD-10-CM

## 2023-08-23 DIAGNOSIS — M54.9 CHRONIC NECK AND BACK PAIN: ICD-10-CM

## 2023-08-23 DIAGNOSIS — F43.12 POST-TRAUMATIC STRESS DISORDER, CHRONIC: Primary | ICD-10-CM

## 2023-08-23 DIAGNOSIS — G47.00 INSOMNIA, UNSPECIFIED TYPE: ICD-10-CM

## 2023-08-25 ENCOUNTER — TELEMEDICINE (OUTPATIENT)
Dept: PSYCHIATRY | Facility: CLINIC | Age: 43
End: 2023-08-25
Payer: COMMERCIAL

## 2023-08-25 DIAGNOSIS — M54.2 CHRONIC NECK AND BACK PAIN: ICD-10-CM

## 2023-08-25 DIAGNOSIS — M54.9 CHRONIC NECK AND BACK PAIN: ICD-10-CM

## 2023-08-25 DIAGNOSIS — F43.12 POST-TRAUMATIC STRESS DISORDER, CHRONIC: ICD-10-CM

## 2023-08-25 DIAGNOSIS — Z86.69 HISTORY OF MIGRAINE WITH AURA: ICD-10-CM

## 2023-08-25 DIAGNOSIS — F90.8 ATTENTION DEFICIT HYPERACTIVITY DISORDER (ADHD), OTHER TYPE: ICD-10-CM

## 2023-08-25 DIAGNOSIS — G89.29 CHRONIC NECK AND BACK PAIN: ICD-10-CM

## 2023-08-25 DIAGNOSIS — Z56.6 WORK-RELATED STRESS: ICD-10-CM

## 2023-08-25 SDOH — HEALTH STABILITY - MENTAL HEALTH: OTHER PHYSICAL AND MENTAL STRAIN RELATED TO WORK: Z56.6

## 2023-08-29 ENCOUNTER — TELEMEDICINE (OUTPATIENT)
Dept: PSYCHIATRY | Facility: CLINIC | Age: 43
End: 2023-08-29
Payer: COMMERCIAL

## 2023-08-29 DIAGNOSIS — F43.12 POST-TRAUMATIC STRESS DISORDER, CHRONIC: Primary | ICD-10-CM

## 2023-08-29 DIAGNOSIS — M54.2 CHRONIC NECK AND BACK PAIN: ICD-10-CM

## 2023-08-29 DIAGNOSIS — M54.9 CHRONIC NECK AND BACK PAIN: ICD-10-CM

## 2023-08-29 DIAGNOSIS — Z86.69 HISTORY OF MIGRAINE WITH AURA: ICD-10-CM

## 2023-08-29 DIAGNOSIS — F90.8 ATTENTION DEFICIT HYPERACTIVITY DISORDER (ADHD), OTHER TYPE: ICD-10-CM

## 2023-08-29 DIAGNOSIS — Z63.0 PROBLEMS IN RELATIONSHIP WITH SPOUSE OR PARTNER: ICD-10-CM

## 2023-08-29 DIAGNOSIS — G47.00 INSOMNIA, UNSPECIFIED TYPE: ICD-10-CM

## 2023-08-29 DIAGNOSIS — G89.29 CHRONIC NECK AND BACK PAIN: ICD-10-CM

## 2023-08-29 SDOH — SOCIAL STABILITY - SOCIAL INSECURITY: PROBLEMS IN RELATIONSHIP WITH SPOUSE OR PARTNER: Z63.0

## 2023-08-31 ENCOUNTER — TELEMEDICINE (OUTPATIENT)
Dept: PSYCHIATRY | Facility: CLINIC | Age: 43
End: 2023-08-31
Payer: COMMERCIAL

## 2023-08-31 DIAGNOSIS — Z63.0 PROBLEMS IN RELATIONSHIP WITH SPOUSE OR PARTNER: ICD-10-CM

## 2023-08-31 DIAGNOSIS — G43.909 ACUTE MIGRAINE: ICD-10-CM

## 2023-08-31 DIAGNOSIS — F43.12 POST-TRAUMATIC STRESS DISORDER, CHRONIC: Primary | ICD-10-CM

## 2023-08-31 DIAGNOSIS — Z56.6 WORK-RELATED STRESS: ICD-10-CM

## 2023-08-31 DIAGNOSIS — F90.8 ATTENTION DEFICIT HYPERACTIVITY DISORDER (ADHD), OTHER TYPE: ICD-10-CM

## 2023-08-31 SDOH — HEALTH STABILITY - MENTAL HEALTH: OTHER PHYSICAL AND MENTAL STRAIN RELATED TO WORK: Z56.6

## 2023-08-31 SDOH — SOCIAL STABILITY - SOCIAL INSECURITY: PROBLEMS IN RELATIONSHIP WITH SPOUSE OR PARTNER: Z63.0

## 2023-09-05 ENCOUNTER — TELEMEDICINE (OUTPATIENT)
Dept: PSYCHIATRY | Facility: CLINIC | Age: 43
End: 2023-09-05
Payer: COMMERCIAL

## 2023-09-05 DIAGNOSIS — Z56.6 WORK-RELATED STRESS: ICD-10-CM

## 2023-09-05 DIAGNOSIS — F43.12 POST-TRAUMATIC STRESS DISORDER, CHRONIC: ICD-10-CM

## 2023-09-05 DIAGNOSIS — M54.2 CHRONIC NECK AND BACK PAIN: ICD-10-CM

## 2023-09-05 DIAGNOSIS — Z63.0 PROBLEMS IN RELATIONSHIP WITH SPOUSE OR PARTNER: ICD-10-CM

## 2023-09-05 DIAGNOSIS — G89.29 CHRONIC NECK AND BACK PAIN: ICD-10-CM

## 2023-09-05 DIAGNOSIS — M54.9 CHRONIC NECK AND BACK PAIN: ICD-10-CM

## 2023-09-05 DIAGNOSIS — F90.8 ATTENTION DEFICIT HYPERACTIVITY DISORDER (ADHD), OTHER TYPE: Primary | ICD-10-CM

## 2023-09-05 DIAGNOSIS — Z86.69 HISTORY OF MIGRAINE WITH AURA: ICD-10-CM

## 2023-09-05 SDOH — SOCIAL STABILITY - SOCIAL INSECURITY: PROBLEMS IN RELATIONSHIP WITH SPOUSE OR PARTNER: Z63.0

## 2023-09-05 SDOH — HEALTH STABILITY - MENTAL HEALTH: OTHER PHYSICAL AND MENTAL STRAIN RELATED TO WORK: Z56.6

## 2023-09-07 ENCOUNTER — TELEMEDICINE (OUTPATIENT)
Dept: PSYCHIATRY | Facility: CLINIC | Age: 43
End: 2023-09-07
Payer: COMMERCIAL

## 2023-09-07 DIAGNOSIS — F43.12 POST-TRAUMATIC STRESS DISORDER, CHRONIC: ICD-10-CM

## 2023-09-07 DIAGNOSIS — F90.8 ATTENTION DEFICIT HYPERACTIVITY DISORDER (ADHD), OTHER TYPE: Primary | ICD-10-CM

## 2023-09-08 ENCOUNTER — TELEMEDICINE (OUTPATIENT)
Dept: PSYCHIATRY | Facility: CLINIC | Age: 43
End: 2023-09-08
Payer: COMMERCIAL

## 2023-09-11 ENCOUNTER — TELEMEDICINE (OUTPATIENT)
Dept: PSYCHIATRY | Facility: CLINIC | Age: 43
End: 2023-09-11
Payer: COMMERCIAL

## 2023-09-11 DIAGNOSIS — M54.9 CHRONIC NECK AND BACK PAIN: ICD-10-CM

## 2023-09-11 DIAGNOSIS — F43.12 POST-TRAUMATIC STRESS DISORDER, CHRONIC: Primary | ICD-10-CM

## 2023-09-11 DIAGNOSIS — M54.2 CHRONIC NECK AND BACK PAIN: ICD-10-CM

## 2023-09-11 DIAGNOSIS — F43.9 STRESS AT HOME: ICD-10-CM

## 2023-09-11 DIAGNOSIS — F90.8 ATTENTION DEFICIT HYPERACTIVITY DISORDER (ADHD), OTHER TYPE: ICD-10-CM

## 2023-09-11 DIAGNOSIS — G89.29 CHRONIC NECK AND BACK PAIN: ICD-10-CM

## 2023-09-12 ENCOUNTER — LAB (OUTPATIENT)
Dept: LAB | Facility: HOSPITAL | Age: 43
End: 2023-09-12
Payer: COMMERCIAL

## 2023-09-12 ENCOUNTER — TRANSCRIBE ORDERS (OUTPATIENT)
Dept: LAB | Facility: HOSPITAL | Age: 43
End: 2023-09-12
Payer: COMMERCIAL

## 2023-09-12 ENCOUNTER — TELEMEDICINE (OUTPATIENT)
Dept: PSYCHIATRY | Facility: CLINIC | Age: 43
End: 2023-09-12
Payer: COMMERCIAL

## 2023-09-12 DIAGNOSIS — Z63.0 PROBLEMS IN RELATIONSHIP WITH SPOUSE OR PARTNER: ICD-10-CM

## 2023-09-12 DIAGNOSIS — F90.8 ATTENTION DEFICIT HYPERACTIVITY DISORDER (ADHD), OTHER TYPE: ICD-10-CM

## 2023-09-12 DIAGNOSIS — F43.12 POST-TRAUMATIC STRESS DISORDER, CHRONIC: Primary | ICD-10-CM

## 2023-09-12 DIAGNOSIS — Z20.2 VENEREAL DISEASE CONTACT: ICD-10-CM

## 2023-09-12 DIAGNOSIS — Z20.2 VENEREAL DISEASE CONTACT: Primary | ICD-10-CM

## 2023-09-12 LAB — HBV SURFACE AG SERPL QL IA: NORMAL

## 2023-09-12 PROCEDURE — 87340 HEPATITIS B SURFACE AG IA: CPT

## 2023-09-12 PROCEDURE — 86780 TREPONEMA PALLIDUM: CPT

## 2023-09-12 PROCEDURE — G0432 EIA HIV-1/HIV-2 SCREEN: HCPCS

## 2023-09-12 PROCEDURE — 36415 COLL VENOUS BLD VENIPUNCTURE: CPT

## 2023-09-12 PROCEDURE — 86803 HEPATITIS C AB TEST: CPT

## 2023-09-12 SDOH — SOCIAL STABILITY - SOCIAL INSECURITY: PROBLEMS IN RELATIONSHIP WITH SPOUSE OR PARTNER: Z63.0

## 2023-09-13 ENCOUNTER — TELEMEDICINE (OUTPATIENT)
Dept: PSYCHIATRY | Facility: CLINIC | Age: 43
End: 2023-09-13
Payer: COMMERCIAL

## 2023-09-13 DIAGNOSIS — G89.29 CHRONIC NECK AND BACK PAIN: ICD-10-CM

## 2023-09-13 DIAGNOSIS — F43.12 POST-TRAUMATIC STRESS DISORDER, CHRONIC: Primary | ICD-10-CM

## 2023-09-13 DIAGNOSIS — M54.9 CHRONIC NECK AND BACK PAIN: ICD-10-CM

## 2023-09-13 DIAGNOSIS — F90.8 ATTENTION DEFICIT HYPERACTIVITY DISORDER (ADHD), OTHER TYPE: ICD-10-CM

## 2023-09-13 DIAGNOSIS — M54.2 CHRONIC NECK AND BACK PAIN: ICD-10-CM

## 2023-09-13 DIAGNOSIS — Z86.69 HISTORY OF MIGRAINE WITH AURA: ICD-10-CM

## 2023-09-13 DIAGNOSIS — Z56.6 WORK-RELATED STRESS: ICD-10-CM

## 2023-09-13 DIAGNOSIS — Z63.0 PROBLEMS IN RELATIONSHIP WITH SPOUSE OR PARTNER: ICD-10-CM

## 2023-09-13 LAB
HCV AB SER DONR QL: NORMAL
HIV 1+2 AB+HIV1 P24 AG SERPL QL IA: NORMAL

## 2023-09-13 SDOH — SOCIAL STABILITY - SOCIAL INSECURITY: PROBLEMS IN RELATIONSHIP WITH SPOUSE OR PARTNER: Z63.0

## 2023-09-13 SDOH — HEALTH STABILITY - MENTAL HEALTH: OTHER PHYSICAL AND MENTAL STRAIN RELATED TO WORK: Z56.6

## 2023-09-14 LAB — TREPONEMA PALLIDUM IGG+IGM AB [PRESENCE] IN SERUM OR PLASMA BY IMMUNOASSAY: NON REACTIVE

## 2023-09-17 NOTE — PROGRESS NOTES
Video Visit      Patient Name: Sylvia Douglass  : 1980   MRN: [id number]   Start: 10:01  Stop: 10:49  Referring Physician: Lisset Velásquez MD    Chief Complaint:      ICD-10-CM ICD-9-CM   1. Attention deficit hyperactivity disorder (ADHD), other type  F90.8 314.01   2. Post traumatic stress disorder (PTSD)  F43.10 309.81   3. Chronic neck and back pain  M54.2 723.1    M54.9 724.5    G89.29 338.29   4. History of migraine with aura  Z86.69 V12.49   5. Stress at home  F43.9 V61.9        This provider is located at [facility], using a secure [employer] Video Visit through [employer]. Sylvia Douglass is being seen remotely via telehealth at their home address in [place], and stated they are in a secure environment for this session. The patient's condition being diagnosed/treated is appropriate for telemedicine. I Rubi Garduno, PhD identified myself as well as my credentials.   The patient, and/or patients guardian, consent to be seen remotely, and when consent is given they understand that the consent allows for patient identifiable information to be sent to a third party as needed. They may refuse to be seen remotely at any time. The electronic data is encrypted and password protected, and the patient and/or guardian has been advised of the potential risks to privacy not withstanding such measures.     You have chosen to receive care through a telehealth visit.  Do you consent to use a video/audio connection for your medical care today? YES.    History of Present Illness: Sylvia Douglass is a 43 y.o. female who I spoke with on video visit today for follow up care navigation and supportive psychotherapy. Ms. Douglass has a history of an asymptomatic vertex meningioma, migraine headaches, and neck pain. She has a psychiatric history of trauma, severe anxiety, and depression. Past providers have included her therapist, Sulma  "AJAY Carpenter, at a  clinic for several years. Additionally, she participates in relationship counseling with BAILEY Santos.        Subjective   Review of Systems:   Review of Systems    Patient History:   The following portions of the patient's history were reviewed and updated as appropriate: allergies, current medications, past family history, past medical history, past social history, past surgical history and problem list.     Social:  Sylvia reports working part-time at [employer], two days per week. She states having employee fertility benefits and shared more about her plans with conceiving a child. She reports heightened anxiety regarding her relationship but that her symptoms are situational and \"not bad\".     Medications:     Current Outpatient Medications:     Alpha-Lipoic Acid (LIPOIC ACID PO), Take 300 mg by mouth., Disp: , Rfl:     ALPRAZolam (XANAX) 0.5 MG tablet, Take 1 tablet by mouth., Disp: , Rfl:     Ascorbic Acid (Vitamin C ER) 1000 MG tablet controlled-release, Take  by mouth., Disp: , Rfl:     bimatoprost (LATISSE) 0.03 % ophthalmic solution, Latisse 0.03 % eyelash drops  APPLY 1 DROP TO APPLICATOR AND APPLY TO UPPER EYELID, ALONG EYELASHES, BY TOPICAL ROUTE ONCE DAILY AT NIGHTTIME, Disp: , Rfl:     BIOTIN PO, biotin, Disp: , Rfl:     butalbital-acetaminophen-caffeine (FIORICET, ESGIC) -40 MG per tablet, , Disp: , Rfl:     Calcium Citrate-Vitamin D3 (CITRACAL) 315-6.25 MG-MCG tablet tablet, Take  by mouth Daily., Disp: , Rfl:     clindamycin (CLINDAGEL) 1 % gel, clindamycin 1 % topical gel  APPLY A THIN LAYER TO AREA OF BREAKOUTS ON FACE TWO TIMES A DAY, Disp: , Rfl:     Cyanocobalamin (Vitamin B 12) 100 MCG lozenge, Take 5,000 mcg by mouth., Disp: , Rfl:     cyclobenzaprine (FLEXERIL) 10 MG tablet, Take 1 tablet by mouth 3 (Three) Times a Day., Disp: 15 tablet, Rfl: 0    DHEA 25 MG capsule, Take  by mouth., Disp: , Rfl:     Dymista 137-50 MCG/ACT suspension, , Disp: , Rfl: "     EPINEPHrine (EPIPEN) 0.3 MG/0.3ML solution auto-injector injection, , Disp: , Rfl:     famotidine (PEPCID) 20 MG tablet, , Disp: , Rfl:     guaiFENesin (HUMIBID 3) 400 MG tablet, Take 1 tablet by mouth 6 (Six) Times a Day., Disp: , Rfl:     HYDROcodone-acetaminophen (NORCO) 7.5-325 MG per tablet, , Disp: , Rfl:     IRON, FERROUS SULFATE, PO, Take 60 mg by mouth., Disp: , Rfl:     l-methylfolate calcium (DEPLIN) 7.5 MG tablet tablet, Take 1 tablet by mouth Daily., Disp: , Rfl:     Loratadine 10 MG capsule, loratadine, Disp: , Rfl:     MAGNESIUM GLYCINATE PO, Take 200 mg by mouth., Disp: , Rfl:     Melatonin 1 MG capsule, Take 4 mg by mouth., Disp: , Rfl:     multivitamin with minerals (MULTIVITAMIN ADULT PO), Take 1 tablet by mouth Daily., Disp: , Rfl:     naloxone (NARCAN) 4 MG/0.1ML nasal spray, SMARTSIG:Both Nares, Disp: , Rfl:     NON FORMULARY, 1,000 mg. M-acetylcysteine, Disp: , Rfl:     Nutritional Supplements (VITAMIN D BOOSTER PO), Vitamin D2, Disp: , Rfl:     oxyCODONE (Roxicodone) 5 MG immediate release tablet, Take 1 tablet by mouth Every 4 (Four) Hours As Needed for Moderate Pain., Disp: 10 tablet, Rfl: 0    promethazine (PHENERGAN) 25 MG tablet, Take 1 tablet by mouth Every 6 (Six) Hours As Needed for Nausea or Vomiting., Disp: 10 tablet, Rfl: 0    pseudoephedrine (SUDAFED) 120 MG 12 hr tablet, Every 12 (Twelve) Hours., Disp: , Rfl:     rizatriptan (MAXALT) 10 MG tablet, , Disp: , Rfl:     sucralfate (CARAFATE) 1 g tablet, Take 1 tablet by mouth 4 (Four) Times a Day., Disp: , Rfl:     UBIQUINOL PO, Take 200 mg by mouth., Disp: , Rfl:     vitamin E 1000 UNIT capsule, Take 1 capsule by mouth Daily., Disp: , Rfl:     Zinc 23 MG lozenge, Dissolve 22 mg in the mouth., Disp: , Rfl:     Objective     Mental Status Exam:   Appearance: Normal  Hygiene: Adequate  Cooperation: Cooperative  Eye Contact: Normal  Psychomotor Behavior: Unremarkable   Mood: Neutral  Affect: Appropriate  Speech: Clear  Thought  "Process: Normal   Thought Content: Appropriate  Suicidal: No SI indicated  Homicidal: No HI indicated  Hallucinations: None reported   Delusion: None reported  Memory: Grossly intact  Orientation: Not assessed  Reliability: Good  Insight: Fair  Judgement: Good  Impulse Control: Less impulsive    Assessment / Plan      Visit Diagnosis/Orders Placed This Visit:  Diagnoses and all orders for this visit:    1. Attention deficit hyperactivity disorder (ADHD), other type (Primary)    2. Post traumatic stress disorder (PTSD)    3. Chronic neck and back pain    4. History of migraine with aura    5. Stress at home    Differential:   Generalized Anxiety Disorder    Sylvia reports that she was recently prescribed Ritalin (5mg) by her PCP. She states that the medication, \"helps me piece things together...it calms my brain\". She reports being hypersensitive to medications and that she had side effects with Guanfacine prior to switching to Ritalin. She denies current medication-related concerns. Despite her improved focus and mood, she notes issues with fatigue.     Collaboratively identified potential biopsychosocial factors that contribute to the patient's fatigue. Sylvia offered more insight into how her history of bariatric surgery, reported absorbency issues, progesterone/hormone imbalance, and iron deficiency could be contributing factors. She also agreed that relational distress has been exhaustive for her emotionally. As a way to cope, she was encouraged to challenge her negative anxiety-provoking thoughts by considering whether or not it is something that she can change (e.g., the way she responds to stress) or something that she cannot change (e.g., the actions of her boyfriend).     Reiterated the negative effects of mental stress on physical health (e.g., prolonged fatigue, brain fog, etc.). Analyzed the comorbidity of her symptoms and the importance of continued screening and care. Reaffirmed things within her " control (e.g., choice of healthcare providers). She verbalized an acceptance of her current diagnoses and treatment of recommendations from care providers, including:  ANNA Coates - PCP  BAILEY Arana - trauma-focused therapist  Dr. Orozco - Gastro  [facility] provider - Bariatric  Dr. Da Crockett - Neurology  Dr. Harley Wright - Neurosurgery  ANNA Aragon & [facility]. Lyndsey Willis [facility] - pain management   María Leon - allergist  Thania Castillo - Dermatologist   Sharla Orozco - Hematologist   Lyndsey Blair MD-FACOG - OB-GYN  [facility] - [place] surgeon     Plan:   Ameliorate or decrease the frequency and severity of depression, anxiety, panic, and ADHD by attending routine psychotherapy sessions.  Verbalize healthy, realistic cognitive messages that promote harmony with others, self-acceptance, and confidence.   Maintain scheduled appointments with treatment providers, including therapists, primary care providers, specialists, etc.  Implement relaxation strategies in her daily routine to combat stress and make chronic pain more manageable.  Continue trauma-focused therapy. Decrease the frequency of trauma-related flashbacks.      The patient seems reasonably able to adhere to the treatment plan.        SAFETY CONSIDERATIONS:  The patient adamantly denies suicidal ideation or recent episodes of self-harm. She agreed to call [phone], [phone], and/or go to the ER if her psychiatric symptoms worsen. She agreed with the current safety plan.    Quality Measures:   Never smoker    Sylvia Douglass appears aware of the risks of tobacco use.     Follow Up:   Return in about 1 week (around 8/10/2023) for Video visit.    Rubi Garduno, PhD, Temp Licensed Psychologist
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
## 2023-09-21 NOTE — PROGRESS NOTES
Video Visit      Patient Name: Sylvia Douglass  : 1980   MRN: [id number]   Start: 4:04  Stop: 5:01  Referring Physician: Lisset Velásquez MD    Chief Complaint:      ICD-10-CM ICD-9-CM   1. Post traumatic stress disorder (PTSD)  F43.10 309.81   2. Attention deficit hyperactivity disorder (ADHD), other type  F90.8 314.01   3. Stress at home  F43.9 V61.9   4. Chronic neck and back pain  M54.2 723.1    M54.9 724.5    G89.29 338.29   5. History of migraine with aura  Z86.69 V12.49        This provider is located at [facility], using a secure [employer] Video Visit through [employer]. Sylvia Douglass is being seen remotely via telehealth at their home address in [place], and stated they are in a secure environment for this session. The patient's condition being diagnosed/treated is appropriate for telemedicine. I Rubi Garduno, PhD identified myself as well as my credentials.   The patient, and/or patients guardian, consent to be seen remotely, and when consent is given they understand that the consent allows for patient identifiable information to be sent to a third party as needed.   They may refuse to be seen remotely at any time. The electronic data is encrypted and password protected, and the patient and/or guardian has been advised of the potential risks to privacy not withstanding such measures.     You have chosen to receive care through a telehealth visit.  Do you consent to use a video/audio connection for your medical care today? YES.    History of Present Illness: Sylvia Douglass is a 43 y.o. female who I spoke with on video visit today for Sylvia Douglass is a 43 y.o. female who I spoke with on video visit today for follow up care navigation and supportive psychotherapy. Ms. Douglass has a history of an asymptomatic vertex meningioma, migraine headaches, back and neck pain. She has a psychiatric history of  trauma, severe anxiety, and depression. Past providers have included Sulma Carpenter LCSW, at a  clinic for several years and BAILEY Santos.       An evaluation for ADHD suggests that she has clinically significant difficulties with inattentiveness and impulsivity, but her symptoms do not meet all of the diagnostic criteria for ADHD. Ms. Douglass does report less impulsivity, more controlled thought, and improved focus with medication management of her symptoms.     Subjective   Review of Systems:     Patient History:   The following portions of the patient's history were reviewed and updated as appropriate: allergies, current medications, past family history, past medical history, past social history, past surgical history and problem list.     Social:  Sylvia reports that her primary therapist, BAILEY Arana, had left the practice. She states that she is unable to schedule with her referred provider until she is fully accredited with her insurance provider. She reports having upcoming appointments with dermatology, neurology, and endocrinology. She was also happy to report that she had an appointment scheduled on 9/14 at [facility] in [place] to address fertility concerns.      Medications:     Current Outpatient Medications:     Alpha-Lipoic Acid (LIPOIC ACID PO), Take 300 mg by mouth., Disp: , Rfl:     ALPRAZolam (XANAX) 0.5 MG tablet, Take 1 tablet by mouth., Disp: , Rfl:     Ascorbic Acid (Vitamin C ER) 1000 MG tablet controlled-release, Take  by mouth., Disp: , Rfl:     bimatoprost (LATISSE) 0.03 % ophthalmic solution, Latisse 0.03 % eyelash drops  APPLY 1 DROP TO APPLICATOR AND APPLY TO UPPER EYELID, ALONG EYELASHES, BY TOPICAL ROUTE ONCE DAILY AT NIGHTTIME, Disp: , Rfl:     BIOTIN PO, biotin, Disp: , Rfl:     butalbital-acetaminophen-caffeine (FIORICET, ESGIC) -40 MG per tablet, , Disp: , Rfl:     Calcium Citrate-Vitamin D3 (CITRACAL) 315-6.25 MG-MCG tablet  tablet, Take  by mouth Daily., Disp: , Rfl:     clindamycin (CLINDAGEL) 1 % gel, clindamycin 1 % topical gel  APPLY A THIN LAYER TO AREA OF BREAKOUTS ON FACE TWO TIMES A DAY, Disp: , Rfl:     Cyanocobalamin (Vitamin B 12) 100 MCG lozenge, Take 5,000 mcg by mouth., Disp: , Rfl:     cyclobenzaprine (FLEXERIL) 10 MG tablet, Take 1 tablet by mouth 3 (Three) Times a Day., Disp: 15 tablet, Rfl: 0    DHEA 25 MG capsule, Take  by mouth., Disp: , Rfl:     Dymista 137-50 MCG/ACT suspension, , Disp: , Rfl:     EPINEPHrine (EPIPEN) 0.3 MG/0.3ML solution auto-injector injection, , Disp: , Rfl:     famotidine (PEPCID) 20 MG tablet, , Disp: , Rfl:     guaiFENesin (HUMIBID 3) 400 MG tablet, Take 1 tablet by mouth 6 (Six) Times a Day., Disp: , Rfl:     HYDROcodone-acetaminophen (NORCO) 7.5-325 MG per tablet, , Disp: , Rfl:     IRON, FERROUS SULFATE, PO, Take 60 mg by mouth., Disp: , Rfl:     l-methylfolate calcium (DEPLIN) 7.5 MG tablet tablet, Take 1 tablet by mouth Daily., Disp: , Rfl:     Loratadine 10 MG capsule, loratadine, Disp: , Rfl:     MAGNESIUM GLYCINATE PO, Take 200 mg by mouth., Disp: , Rfl:     Melatonin 1 MG capsule, Take 4 mg by mouth., Disp: , Rfl:     multivitamin with minerals (MULTIVITAMIN ADULT PO), Take 1 tablet by mouth Daily., Disp: , Rfl:     naloxone (NARCAN) 4 MG/0.1ML nasal spray, SMARTSIG:Both Nares, Disp: , Rfl:     NON FORMULARY, 1,000 mg. M-acetylcysteine, Disp: , Rfl:     Nutritional Supplements (VITAMIN D BOOSTER PO), Vitamin D2, Disp: , Rfl:     oxyCODONE (Roxicodone) 5 MG immediate release tablet, Take 1 tablet by mouth Every 4 (Four) Hours As Needed for Moderate Pain., Disp: 10 tablet, Rfl: 0    promethazine (PHENERGAN) 25 MG tablet, Take 1 tablet by mouth Every 6 (Six) Hours As Needed for Nausea or Vomiting., Disp: 10 tablet, Rfl: 0    pseudoephedrine (SUDAFED) 120 MG 12 hr tablet, Every 12 (Twelve) Hours., Disp: , Rfl:     rizatriptan (MAXALT) 10 MG tablet, , Disp: , Rfl:     sucralfate  "(CARAFATE) 1 g tablet, Take 1 tablet by mouth 4 (Four) Times a Day., Disp: , Rfl:     UBIQUINOL PO, Take 200 mg by mouth., Disp: , Rfl:     vitamin E 1000 UNIT capsule, Take 1 capsule by mouth Daily., Disp: , Rfl:     Zinc 23 MG lozenge, Dissolve 22 mg in the mouth., Disp: , Rfl:     Objective     Mental Status Exam:  Appearance: Normal   Hygiene: Adequate  Cooperation: Cooperative  Eye Contact: Fleeting  Psychomotor Behavior: Unremarkable  Mood: Mild Anxiety  Affect: Appropriate  Speech: Clear  Thought Process: Normal   Thought Content: Goal-directed  Suicidal: No SI indicated  Homicidal: No HI indicated  Hallucinations: None reported   Delusion: None reported  Memory: Grossly intact  Orientation: Oriented x3  Reliability: Good  Insight: Fair  Judgement: Good  Impulse Control: Circumstantial    Assessment / Plan      Visit Diagnosis/Orders Placed This Visit:  Diagnoses and all orders for this visit:    1. Post traumatic stress disorder (PTSD) (Primary)    2. Attention deficit hyperactivity disorder (ADHD), other type    3. Stress at home    4. Chronic neck and back pain    5. History of migraine with aura    Differential:   Generalized Anxiety Disorder    Assisted the patient with identifying more acute health concerns, pathways for treatment, and goal-setting. Collaboratively identified and discussed more about her migraine triggers and differing symptoms (e.g., burning, hormonal, stress, aura). Analyzed ways to cope that are more effective and the importance of sleep, eating regularly, taking medications, reducing stress, etc. for migraine prevention.     Explored the patient's thoughts, emotions, and relational goals. She shared a personal goal to be  prior to having a baby, preferably on their anniversary (July 11th). She also reports thoughts that this may be her, \"last chance at having a baby\". She states feeling fearful, anxious, and saddened by her chances of conceiving. She admits to being reluctant " to share the details of her fertility appointment to her boyfriend. Gently confronted the patient about how avoiding conflict is an unhealthy way to respond and promote feelings of insecurity or distrust in the relationship. Reviewed the differences between healthy, passive, and aggressive ways of responding.    Encouraged the patient to work towards an acceptance of her emotions, both positive and negative. Analyzed how in her situation, anxiety precluded and may have positively contributed to her decision to attain a part-time job that offered fertility benefits for assistance with conception. She reports thoughts that her boyfriend will unlikely support her decision to have a child due to his goals of buying a house and focus on career advancement. Challenged negative thinking by offering the possibility of a more positive response, such as communicating more effectively about relationship goals without fear of dismissal, making compromises, and/or prioritizing relationship goals as a couple.        Treatment Plan/Goals:   1. Stablize mood. Ameliorate or decrease the frequency and severity of depression, anxiety, and panic by attending routine cognitive behavior therapy and/or medication management.  2. Verbalize healthy, realistic cognitive messages that promote harmony with others, self-acceptance, and confidence.   3. Maintain scheduled appointments with treatment providers, including therapists, primary care providers, specialists, etc.  4. Implement relaxation strategies in her daily routine to combat stress and make chronic pain more manageable.  5. Improve support system. Actively participate in individual and/or group psychotherapy routinely.   6. Continue CBT. Decrease the frequency of trauma-related flash backs and self-defeating thoughts that exacerbate her pain problems. Improve conflict-resolution and distress tolerance skills.      The patient seems reasonably able to adhere to the treatment plan.      Safety Considerations:  The patient adamantly and convincingly denies current suicidal or homicidal ideation or perceptual disturbance. Risk factors which would indicate the need for a higher level of care, include thoughts to harm self or others and/or self-harming behavior. The patient is to contact this office, call 911 or 988 for the Suicide and Crisis Lifeline, or present to the nearest emergency room should any of these events occur.     Quality Measures:   Never smoker    Sylvia Douglass appears aware of the risks of tobacco use.     Follow Up:   Return in about 2 days (around 8/23/2023) for Video visit.      Rubi Garduno, PhD, Temp Licensed Psychologist

## 2023-09-21 NOTE — PROGRESS NOTES
Video Visit      Patient Name: Sylvia Douglass  : 1980   MRN: [id number]   Start: 3:07 Stop: 3:52  Referring Physician: Lisset Velásquez MD    Chief Complaint:      ICD-10-CM ICD-9-CM   1. Post-traumatic stress disorder, chronic  F43.12 309.81   2. Attention deficit hyperactivity disorder (ADHD), other type  F90.8 314.01   3. History of migraine with aura  Z86.69 V12.49   4. Chronic neck and back pain  M54.2 723.1    M54.9 724.5    G89.29 338.29   5. Work-related stress  Z56.6 V62.1        This provider is located at [facility], using a secure [employer] Video Visit through [employer]. Sylvia Douglass is being seen remotely via telehealth at their home address in [place], and stated they are in a secure environment for this session. The patient's condition being diagnosed/treated is appropriate for telemedicine. I Rubi Garduno, PhD identified myself as well as my credentials.   The patient, and/or patients guardian, consent to be seen remotely, and when consent is given they understand that the consent allows for patient identifiable information to be sent to a third party as needed.   They may refuse to be seen remotely at any time. The electronic data is encrypted and password protected, and the patient and/or guardian has been advised of the potential risks to privacy not withstanding such measures.     You have chosen to receive care through a telehealth visit.  Do you consent to use a video/audio connection for your medical care today? YES.    History of Present Illness: Sylvia Douglass is a 43 y.o. female who I spoke with on a video visit today for follow up care navigation and supportive psychotherapy. Ms. Douglass has a history of an asymptomatic vertex meningioma, migraine headaches, back and neck pain. She has a psychiatric history of trauma, severe anxiety, and depression. Past providers have included Sulma Carpenter,  "AJAY, at a  clinic for several years and BAILEY Santos.       An evaluation for ADHD suggests that she has clinically significant difficulties with inattentiveness and impulsivity, but her symptoms do not meet all of the diagnostic criteria for ADHD. Ms. Douglass does report less impulsivity, more controlled thought, and improved focus with medication management of her symptoms.     Subjective   Review of Systems:     Patient History:   The following portions of the patient's history were reviewed and updated as appropriate: allergies, current medications, past family history, past medical history, past social history, past surgical history and problem list.     Social:  Sylvia reports that she felt \"woozy\" when driving home from her endocrinology appointment yesterday. She states that, \"I may have passed out, I'm not sure\". She also reports that her boyfriend was \"in a mood yesterday\". She compared their relationship to a pendulum that goes back and forth constantly.     Medications:     Current Outpatient Medications:     Alpha-Lipoic Acid (LIPOIC ACID PO), Take 300 mg by mouth., Disp: , Rfl:     ALPRAZolam (XANAX) 0.5 MG tablet, Take 1 tablet by mouth., Disp: , Rfl:     Ascorbic Acid (Vitamin C ER) 1000 MG tablet controlled-release, Take  by mouth., Disp: , Rfl:     bimatoprost (LATISSE) 0.03 % ophthalmic solution, Latisse 0.03 % eyelash drops  APPLY 1 DROP TO APPLICATOR AND APPLY TO UPPER EYELID, ALONG EYELASHES, BY TOPICAL ROUTE ONCE DAILY AT NIGHTTIME, Disp: , Rfl:     BIOTIN PO, biotin, Disp: , Rfl:     butalbital-acetaminophen-caffeine (FIORICET, ESGIC) -40 MG per tablet, , Disp: , Rfl:     Calcium Citrate-Vitamin D3 (CITRACAL) 315-6.25 MG-MCG tablet tablet, Take  by mouth Daily., Disp: , Rfl:     clindamycin (CLINDAGEL) 1 % gel, clindamycin 1 % topical gel  APPLY A THIN LAYER TO AREA OF BREAKOUTS ON FACE TWO TIMES A DAY, Disp: , Rfl:     Cyanocobalamin (Vitamin B 12) 100 MCG lozenge, Take " 5,000 mcg by mouth., Disp: , Rfl:     cyclobenzaprine (FLEXERIL) 10 MG tablet, Take 1 tablet by mouth 3 (Three) Times a Day., Disp: 15 tablet, Rfl: 0    DHEA 25 MG capsule, Take  by mouth., Disp: , Rfl:     Dymista 137-50 MCG/ACT suspension, , Disp: , Rfl:     EPINEPHrine (EPIPEN) 0.3 MG/0.3ML solution auto-injector injection, , Disp: , Rfl:     famotidine (PEPCID) 20 MG tablet, , Disp: , Rfl:     guaiFENesin (HUMIBID 3) 400 MG tablet, Take 1 tablet by mouth 6 (Six) Times a Day., Disp: , Rfl:     HYDROcodone-acetaminophen (NORCO) 7.5-325 MG per tablet, , Disp: , Rfl:     IRON, FERROUS SULFATE, PO, Take 60 mg by mouth., Disp: , Rfl:     l-methylfolate calcium (DEPLIN) 7.5 MG tablet tablet, Take 1 tablet by mouth Daily., Disp: , Rfl:     Loratadine 10 MG capsule, loratadine, Disp: , Rfl:     MAGNESIUM GLYCINATE PO, Take 200 mg by mouth., Disp: , Rfl:     Melatonin 1 MG capsule, Take 4 mg by mouth., Disp: , Rfl:     multivitamin with minerals (MULTIVITAMIN ADULT PO), Take 1 tablet by mouth Daily., Disp: , Rfl:     naloxone (NARCAN) 4 MG/0.1ML nasal spray, SMARTSIG:Both Nares, Disp: , Rfl:     NON FORMULARY, 1,000 mg. M-acetylcysteine, Disp: , Rfl:     Nutritional Supplements (VITAMIN D BOOSTER PO), Vitamin D2, Disp: , Rfl:     oxyCODONE (Roxicodone) 5 MG immediate release tablet, Take 1 tablet by mouth Every 4 (Four) Hours As Needed for Moderate Pain., Disp: 10 tablet, Rfl: 0    promethazine (PHENERGAN) 25 MG tablet, Take 1 tablet by mouth Every 6 (Six) Hours As Needed for Nausea or Vomiting., Disp: 10 tablet, Rfl: 0    pseudoephedrine (SUDAFED) 120 MG 12 hr tablet, Every 12 (Twelve) Hours., Disp: , Rfl:     rizatriptan (MAXALT) 10 MG tablet, , Disp: , Rfl:     sucralfate (CARAFATE) 1 g tablet, Take 1 tablet by mouth 4 (Four) Times a Day., Disp: , Rfl:     UBIQUINOL PO, Take 200 mg by mouth., Disp: , Rfl:     vitamin E 1000 UNIT capsule, Take 1 capsule by mouth Daily., Disp: , Rfl:     Zinc 23 MG lozenge, Dissolve 22  "mg in the mouth., Disp: , Rfl:     Objective     Mental Status Exam:   Appearance: Relaxed  Hygiene: Fair  Cooperation: Cooperative  Eye Contact: Fleeting  Psychomotor Behavior: Unremarkable  Mood: Neutral   Affect: Appropriate  Speech: Clear  Thought Process: Normal   Thought Content: Appropriate  Suicidal: No SI indicated  Homicidal: No HI indicated  Hallucinations: None reported   Delusion: None reported  Memory: Grossly intact  Orientation: Oriented x3  Reliability: Good  Insight: Fair  Judgement: Good  Impulse Control: Less impulsive  Assessment / Plan      Visit Diagnosis/Orders Placed This Visit:  Diagnoses and all orders for this visit:    1. Post-traumatic stress disorder, chronic    2. Attention deficit hyperactivity disorder (ADHD), other type    3. History of migraine with aura    4. Chronic neck and back pain    5. Work-related stress    Differential:   Depressive Disorder  Personality Disorder    Initially, explored the patient's progress with health and personal goals. She reports a recent  recommendation for additional screening/tests to rule out POTS given increased episodes of dizziness when standing. She states that her endocrinologist offered no concerns related to her thyroid. She reports expressing concern related to the possibility of adrenal dysfunction associated with low testosterone, but states that her doctor denied related concerns. Reviewed her options to accept his recommendations or to get a second opinion.     The patient reports some progress with managing work-related stress. She states that two of her co-workers had a disagreement and that one \"walked out of the job sight\". Analyzed her more appropriate response to consult with a manager prior to losing her job over conflict. 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 While she was flattered by the offer, " the patient agreed that losing fertility benefits at [employer] would be counterintuitive to her goal of pregnancy. Reiterated the importance of activity pacing to prevent overexertion and to better cope with pain.    Treatment Plan/Goals:   1. Stablize mood. Ameliorate or decrease the frequency and severity of depression, anxiety, and panic by attending routine cognitive behavior therapy and/or medication management.  2. Verbalize healthy, realistic cognitive messages that promote harmony with others, self-acceptance, and confidence.   3. Maintain scheduled appointments with treatment providers, including therapists, primary care providers, specialists, etc.  4. Implement relaxation strategies in her daily routine to combat stress and make chronic pain more manageable.  5. Improve support system. Actively participate in individual and/or group psychotherapy routinely.   6. Continue CBT. Decrease the frequency of trauma-related flash backs and self-defeating thoughts that exacerbate her pain problems. Improve conflict-resolution and distress tolerance skills.      The patient seems reasonably able to adhere to the treatment plan.      Safety Considerations:  The patient adamantly and convincingly denies current suicidal or homicidal ideation or perceptual disturbance. Risk factors which would indicate the need for a higher level of care, include thoughts to harm self or others and/or self-harming behavior. The patient is to contact this office, call 911 or 988 for the Suicide and Crisis Lifeline, or present to the nearest emergency room should any of these events occur.     Quality Measures:   Never smoker      Follow Up:   Return in about 3 days (around 8/28/2023) for Video visit.      Rubi Garduno, PhD, Temp Licensed Psychologist

## 2023-09-21 NOTE — PROGRESS NOTES
Video Visit      Patient Name: Sylvia Douglass  : 1980   MRN: [id number]   Start: 4:36  Stop: 5:40  Referring Physician: Lisset Velásquez MD    Chief Complaint:      ICD-10-CM ICD-9-CM   1. Post-traumatic stress disorder, chronic  F43.12 309.81   2. Attention deficit hyperactivity disorder (ADHD), other type  F90.8 314.01   3. Problems in relationship with spouse or partner  Z63.0 V61.8   4. Insomnia, unspecified type  G47.00 780.52   5. History of migraine with aura  Z86.69 V12.49   6. Chronic neck and back pain  M54.2 723.1    M54.9 724.5    G89.29 338.29        This provider is located at [facility], using a secure [employer] Video Visit through [employer]. Sylvia Douglass is being seen remotely via telehealth at their home address in [place], and stated they are in a secure environment for this session. The patient's condition being diagnosed/treated is appropriate for telemedicine. I Rubi Garduno, PhD identified myself as well as my credentials.   The patient, and/or patients guardian, consent to be seen remotely, and when consent is given they understand that the consent allows for patient identifiable information to be sent to a third party as needed.   They may refuse to be seen remotely at any time. The electronic data is encrypted and password protected, and the patient and/or guardian has been advised of the potential risks to privacy not withstanding such measures.     You have chosen to receive care through a telehealth visit.  Do you consent to use a video/audio connection for your medical care today? YES.     History of Present Illness: Sylvia Douglass is a 43 y.o. female who I spoke with on video visit today follow up care navigation and supportive psychotherapy. Ms. Douglass has a history of an asymptomatic vertex meningioma, migraine headaches, back and neck pain. She has a psychiatric history of trauma,  "severe anxiety, and depression. Past providers have included Sulma Carpenter LCSW, at a  clinic for several years and BAILEY Santos.       An evaluation for ADHD suggests that she has clinically significant difficulties with inattentiveness and impulsivity, but her symptoms do not meet all of the diagnostic criteria for ADHD. Ms. Douglass does report less impulsivity, more controlled thought, and improved focus with medication management of her symptoms.     Subjective   Review of Systems:     Patient History:   The following portions of the patient's history were reviewed and updated as appropriate: allergies, current medications, past family history, past medical history, past social history, past surgical history and problem list.     Social:  Sylvia reports getting her third iron infusion yesterday. She notes being less stressed but that she has had no improvement with energy/reduced fatigue with treatments. Current stressors include her stepmother fariba COVID and \"argument after argument with Paul\" recently.    Medications:     Current Outpatient Medications:     Alpha-Lipoic Acid (LIPOIC ACID PO), Take 300 mg by mouth., Disp: , Rfl:     ALPRAZolam (XANAX) 0.5 MG tablet, Take 1 tablet by mouth., Disp: , Rfl:     Ascorbic Acid (Vitamin C ER) 1000 MG tablet controlled-release, Take  by mouth., Disp: , Rfl:     bimatoprost (LATISSE) 0.03 % ophthalmic solution, Latisse 0.03 % eyelash drops  APPLY 1 DROP TO APPLICATOR AND APPLY TO UPPER EYELID, ALONG EYELASHES, BY TOPICAL ROUTE ONCE DAILY AT NIGHTTIME, Disp: , Rfl:     BIOTIN PO, biotin, Disp: , Rfl:     butalbital-acetaminophen-caffeine (FIORICET, ESGIC) -40 MG per tablet, , Disp: , Rfl:     Calcium Citrate-Vitamin D3 (CITRACAL) 315-6.25 MG-MCG tablet tablet, Take  by mouth Daily., Disp: , Rfl:     clindamycin (CLINDAGEL) 1 % gel, clindamycin 1 % topical gel  APPLY A THIN LAYER TO AREA OF BREAKOUTS ON FACE TWO TIMES A DAY, Disp: , Rfl:     " Cyanocobalamin (Vitamin B 12) 100 MCG lozenge, Take 5,000 mcg by mouth., Disp: , Rfl:     cyclobenzaprine (FLEXERIL) 10 MG tablet, Take 1 tablet by mouth 3 (Three) Times a Day., Disp: 15 tablet, Rfl: 0    DHEA 25 MG capsule, Take  by mouth., Disp: , Rfl:     Dymista 137-50 MCG/ACT suspension, , Disp: , Rfl:     EPINEPHrine (EPIPEN) 0.3 MG/0.3ML solution auto-injector injection, , Disp: , Rfl:     famotidine (PEPCID) 20 MG tablet, , Disp: , Rfl:     guaiFENesin (HUMIBID 3) 400 MG tablet, Take 1 tablet by mouth 6 (Six) Times a Day., Disp: , Rfl:     HYDROcodone-acetaminophen (NORCO) 7.5-325 MG per tablet, , Disp: , Rfl:     IRON, FERROUS SULFATE, PO, Take 60 mg by mouth., Disp: , Rfl:     l-methylfolate calcium (DEPLIN) 7.5 MG tablet tablet, Take 1 tablet by mouth Daily., Disp: , Rfl:     Loratadine 10 MG capsule, loratadine, Disp: , Rfl:     MAGNESIUM GLYCINATE PO, Take 200 mg by mouth., Disp: , Rfl:     Melatonin 1 MG capsule, Take 4 mg by mouth., Disp: , Rfl:     multivitamin with minerals (MULTIVITAMIN ADULT PO), Take 1 tablet by mouth Daily., Disp: , Rfl:     naloxone (NARCAN) 4 MG/0.1ML nasal spray, SMARTSIG:Both Nares, Disp: , Rfl:     NON FORMULARY, 1,000 mg. M-acetylcysteine, Disp: , Rfl:     Nutritional Supplements (VITAMIN D BOOSTER PO), Vitamin D2, Disp: , Rfl:     oxyCODONE (Roxicodone) 5 MG immediate release tablet, Take 1 tablet by mouth Every 4 (Four) Hours As Needed for Moderate Pain., Disp: 10 tablet, Rfl: 0    promethazine (PHENERGAN) 25 MG tablet, Take 1 tablet by mouth Every 6 (Six) Hours As Needed for Nausea or Vomiting., Disp: 10 tablet, Rfl: 0    pseudoephedrine (SUDAFED) 120 MG 12 hr tablet, Every 12 (Twelve) Hours., Disp: , Rfl:     rizatriptan (MAXALT) 10 MG tablet, , Disp: , Rfl:     sucralfate (CARAFATE) 1 g tablet, Take 1 tablet by mouth 4 (Four) Times a Day., Disp: , Rfl:     UBIQUINOL PO, Take 200 mg by mouth., Disp: , Rfl:     vitamin E 1000 UNIT capsule, Take 1 capsule by mouth  Daily., Disp: , Rfl:     Zinc 23 MG lozenge, Dissolve 22 mg in the mouth., Disp: , Rfl:     Objective     Mental Status Exam:   Appearance: Dressed Neatly  Hygiene: Adequate  Cooperation: Cooperative  Eye Contact: Fleeting  Psychomotor Behavior: Agitated  Mood: Frustrated   Affect: Appropriate  Speech: Emotion-filled  Thought Process: Labile   Thought Content: Self-defeated   Suicidal: No SI indicated  Homicidal: No HI indicated  Hallucinations: None reported   Delusion: None reported  Memory: Not assessed  Orientation: Oriented x3  Reliability: Good  Insight: Fair  Judgement: Good  Impulse Control: Influenced by mood     Assessment / Plan      Visit Diagnosis/Orders Placed This Visit:  Diagnoses and all orders for this visit:    1. Post-traumatic stress disorder, chronic (Primary)    2. Attention deficit hyperactivity disorder (ADHD), other type    3. Problems in relationship with spouse or partner    4. Insomnia, unspecified type    5. History of migraine with aura    6. Chronic neck and back pain    Differential:   Depressive Disorder  Emotional abuse     Validated patient's feelings of frustration. Identified concerns related to her boyfriend's reported labile mood, his tendency to put her down or discredit her needs, and his unwillingness to compromise in the relationship. She also states that he is negatively influenced by social media and videos by narcissistic men who promote dominance over women in the relationship. Rather than conforming to a purely submissive role in the relationship, the patient was encouraged to practice assertiveness by learning to express needs openly, making compromises, expressing more acts of love, avoiding stonewalling, and being more supportive of one another in the relationship.     Assisted the patient with developing a plan of action with the intention of decreasing the current level of stress within the home, improving communication, being less-rigid with thinking, and  showing compassion to her partner. She agreed about the continued benefit of pausing and thinking before responding. Reiterated the benefits of empathic listening and being more open-minded to his reasonable requests for attention. Offered ideas for improving the quality of their interactions, such as avoiding criticizing one another and making time for positive interactions. She agreed to show love through acts of service by rubbing his back and respecting his request to avoid cursing excessively.      Treatment Plan/Goals:   1. Stablize mood. Ameliorate or decrease the frequency and severity of depression, anxiety, and panic by attending routine cognitive behavior therapy and/or medication management.  2. Verbalize healthy, realistic cognitive messages that promote harmony with others, self-acceptance, and confidence.   3. Maintain scheduled appointments with treatment providers, including therapists, primary care providers, specialists, etc.  4. Implement relaxation strategies in her daily routine to combat stress and make chronic pain more manageable.  5. Improve support system. Actively participate in individual and/or group psychotherapy routinely.   6. Continue CBT. Decrease the frequency of trauma-related flash backs and self-defeating thoughts that exacerbate her pain problems. Improve conflict-resolution and distress tolerance skills.      The patient seems reasonably able to adhere to the treatment plan.      Safety Considerations:  The patient adamantly and convincingly denies current suicidal or homicidal ideation or perceptual disturbance. Risk factors which would indicate the need for a higher level of care, include thoughts to harm self or others and/or self-harming behavior. The patient is to contact this office, call 911 or 988 for the Suicide and Crisis Lifeline, or present to the nearest emergency room should any of these events occur.     Quality Measures:   Never smoker    Follow Up:   Return  in about 2 days (around 8/31/2023) for Video visit.      Rubi Garduno, PhD, Temp Licensed Psychologist

## 2023-09-21 NOTE — PROGRESS NOTES
Video Visit      Patient Name: Sylvia Douglass  : 1980   MRN: [id number]   Start: 10:41 Stop: 11:25  Referring Physician: Lisset Velásquez MD    Chief Complaint:      ICD-10-CM ICD-9-CM   1. Post traumatic stress disorder (PTSD)  F43.10 309.81   2. Attention deficit hyperactivity disorder (ADHD), other type  F90.8 314.01   3. Stress at home  F43.9 V61.9   4. History of migraine with aura  Z86.69 V12.49   5. Chronic neck and back pain  M54.2 723.1    M54.9 724.5    G89.29 338.29        This provider is located at [facility], using a secure [employer] Video Visit through [employer]. Sylvia Douglass is being seen remotely via telehealth at their home address in [place], and stated they are in a secure environment for this session. The patient's condition being diagnosed/treated is appropriate for telemedicine. I Rubi Garduno, PhD identified myself as well as my credentials.   The patient, and/or patients guardian, consent to be seen remotely, and when consent is given they understand that the consent allows for patient identifiable information to be sent to a third party as needed.   They may refuse to be seen remotely at any time. The electronic data is encrypted and password protected, and the patient and/or guardian has been advised of the potential risks to privacy not withstanding such measures.     You have chosen to receive care through a telehealth visit.  Do you consent to use a video/audio connection for your medical care today? YES.    History of Present Illness: Sylvia Douglass is a 43 y.o. female who I spoke with on video visit today for follow up care navigation and supportive psychotherapy. Ms. Douglass has a history of an asymptomatic vertex meningioma, migraine headaches, back and neck pain. She has a psychiatric history of trauma, severe anxiety, and depression. Past providers have included her therapist, Sumla  "AJAY Carpenter, at a  clinic for several years. Additionally, she participates in relationship counseling with BAILEY Santos.      An evaluation for ADHD suggests that she has clinically significant difficulties with inattentiveness and impulsivity, but her symptoms do not meet all of the diagnostic criteria for ADHD. Ms. Douglass does report less impulsivity, more controlled thought, and improved focus with medication management of her symptoms.       Subjective   Review of Systems:   Review of Systems    Patient History:   The following portions of the patient's history were reviewed and updated as appropriate: allergies, current medications, past family history, past medical history, past social history, past surgical history and problem list.     Social:  Sylvia reports feeling better since having an iron infusion. She also states that she had \"got so much done\" since taking stimulant medication prescribed by her PCP. Despite her improvement with focus, she continues to report conflict within her relationship with Paul.     Medications:     Current Outpatient Medications:     Alpha-Lipoic Acid (LIPOIC ACID PO), Take 300 mg by mouth., Disp: , Rfl:     ALPRAZolam (XANAX) 0.5 MG tablet, Take 1 tablet by mouth., Disp: , Rfl:     Ascorbic Acid (Vitamin C ER) 1000 MG tablet controlled-release, Take  by mouth., Disp: , Rfl:     bimatoprost (LATISSE) 0.03 % ophthalmic solution, Latisse 0.03 % eyelash drops  APPLY 1 DROP TO APPLICATOR AND APPLY TO UPPER EYELID, ALONG EYELASHES, BY TOPICAL ROUTE ONCE DAILY AT NIGHTTIME, Disp: , Rfl:     BIOTIN PO, biotin, Disp: , Rfl:     butalbital-acetaminophen-caffeine (FIORICET, ESGIC) -40 MG per tablet, , Disp: , Rfl:     Calcium Citrate-Vitamin D3 (CITRACAL) 315-6.25 MG-MCG tablet tablet, Take  by mouth Daily., Disp: , Rfl:     clindamycin (CLINDAGEL) 1 % gel, clindamycin 1 % topical gel  APPLY A THIN LAYER TO AREA OF BREAKOUTS ON FACE TWO TIMES A DAY, Disp: , Rfl:     " Cyanocobalamin (Vitamin B 12) 100 MCG lozenge, Take 5,000 mcg by mouth., Disp: , Rfl:     cyclobenzaprine (FLEXERIL) 10 MG tablet, Take 1 tablet by mouth 3 (Three) Times a Day., Disp: 15 tablet, Rfl: 0    DHEA 25 MG capsule, Take  by mouth., Disp: , Rfl:     Dymista 137-50 MCG/ACT suspension, , Disp: , Rfl:     EPINEPHrine (EPIPEN) 0.3 MG/0.3ML solution auto-injector injection, , Disp: , Rfl:     famotidine (PEPCID) 20 MG tablet, , Disp: , Rfl:     guaiFENesin (HUMIBID 3) 400 MG tablet, Take 1 tablet by mouth 6 (Six) Times a Day., Disp: , Rfl:     HYDROcodone-acetaminophen (NORCO) 7.5-325 MG per tablet, , Disp: , Rfl:     IRON, FERROUS SULFATE, PO, Take 60 mg by mouth., Disp: , Rfl:     l-methylfolate calcium (DEPLIN) 7.5 MG tablet tablet, Take 1 tablet by mouth Daily., Disp: , Rfl:     Loratadine 10 MG capsule, loratadine, Disp: , Rfl:     MAGNESIUM GLYCINATE PO, Take 200 mg by mouth., Disp: , Rfl:     Melatonin 1 MG capsule, Take 4 mg by mouth., Disp: , Rfl:     multivitamin with minerals (MULTIVITAMIN ADULT PO), Take 1 tablet by mouth Daily., Disp: , Rfl:     naloxone (NARCAN) 4 MG/0.1ML nasal spray, SMARTSIG:Both Nares, Disp: , Rfl:     NON FORMULARY, 1,000 mg. M-acetylcysteine, Disp: , Rfl:     Nutritional Supplements (VITAMIN D BOOSTER PO), Vitamin D2, Disp: , Rfl:     oxyCODONE (Roxicodone) 5 MG immediate release tablet, Take 1 tablet by mouth Every 4 (Four) Hours As Needed for Moderate Pain., Disp: 10 tablet, Rfl: 0    promethazine (PHENERGAN) 25 MG tablet, Take 1 tablet by mouth Every 6 (Six) Hours As Needed for Nausea or Vomiting., Disp: 10 tablet, Rfl: 0    pseudoephedrine (SUDAFED) 120 MG 12 hr tablet, Every 12 (Twelve) Hours., Disp: , Rfl:     rizatriptan (MAXALT) 10 MG tablet, , Disp: , Rfl:     sucralfate (CARAFATE) 1 g tablet, Take 1 tablet by mouth 4 (Four) Times a Day., Disp: , Rfl:     UBIQUINOL PO, Take 200 mg by mouth., Disp: , Rfl:     vitamin E 1000 UNIT capsule, Take 1 capsule by mouth  "Daily., Disp: , Rfl:     Zinc 23 MG lozenge, Dissolve 22 mg in the mouth., Disp: , Rfl:     Objective     Mental Status Exam:   Appearance: Neat   Hygiene: Well-groomed  Cooperation: Cooperative  Eye Contact: Fleeting  Psychomotor Behavior: Unremarkable  Mood: Labile  Affect: Appropriate  Speech: Fast  Thought Process: Self-defeating   Thought Content: Appropriate  Suicidal: No SI indicated  Homicidal: No HI indicated  Hallucinations: None reported   Delusion: None reported  Memory: Not assessed  Orientation: Oriented x3  Reliability: Good  Insight: Fair  Judgement: Good  Impulse Control: Less impulsive      Assessment / Plan      Visit Diagnosis/Orders Placed This Visit:  Diagnoses and all orders for this visit:    1. Post traumatic stress disorder (PTSD) (Primary)    2. Attention deficit hyperactivity disorder (ADHD), other type    3. Stress at home    4. History of migraine with aura    5. Chronic neck and back pain    Differential:   Generalized Anxiety Disorder  Agoraphobia  Domestic abuse    Initially, complimented the patient on her appearance. She was thankful for the compliment and shared more about how others respond to her when she wears dresses. 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 She also notes that she has been wearing dresses for the past couple of months, as requested by her boyfriend. Despite the positive responses from others, the patient reports feeling obligated to dress up at all times and uncomfortable with her physical appearance.    Identified underlying factors that likely contribute to her poor self-image, including criticism from her boyfriend for \"wearing the clothes for other people\" and her negative body image. She was tearful when sharing her perceived flaws. Instead, she was challenged to consider the effects that past traumatic experiences have had on self-judgment.     Normalized how a past habits of binge-eating, vitamin/iron " "deficiency, excess skin, etc. are common issues experienced by patients who have had bariatric surgery. She was encouraged to find online groups for added support with related concerns and to wear clothes that were both comfortable for her and professional-looking. Reaffirmed other attributes aside from her appearance that can result with positive feedback from others, including openness, friendliness, extroversion, kindness, etc. Encouraged her to focus on her strengths rather than weaknesses.      Reviewed and emailed the patient the Fair Fighting worksheet. Discussed the importance of not responding to others over-emotionally and taking time-outs rather than yelling at one another. Briefly reviewed the cognitive triad. Analyzed the role of automatic thoughts on their conflict. For example, \"if I don't do what he wants, he's going to cheat on me\". Her response to this thought is typically defensive, angry, avoidant, and argumentative. Assisted her with identifying a more appropriate response to \"Stop, Listen, Process, and Validate\" before losing control.     Identified other forms of aggression (e.g., tone of voice, stonewalling, avoiding each other, etc.) than prolong conflict. Discussed how there is usually an activating event (e.g., she personalizes a comment her boyfriend makes about someone else), that leads to a thought (e.g., I'm not good enough), and results with a negative behavior (e.g., arguing with her boyfriend). Offered alternative ways to respond by altering negative, rigid ways of thinking that prevent conflict resolution.    Treatment Plan/Goals:   1. Stablize mood. Ameliorate or decrease the frequency and severity of depression, anxiety, and panic by attending routine cognitive behavior therapy and/or medication management.  2. Verbalize healthy, realistic cognitive messages that promote harmony with others, self-acceptance, and confidence.   3. Maintain scheduled appointments with treatment " providers, including therapists, primary care providers, specialists, etc.  4. Implement relaxation strategies in her daily routine to combat stress and make chronic pain more manageable.  5. Improve support system. Actively participate in individual and/or group psychotherapy routinely.   6. Continue CBT. Decrease the frequency of trauma-related flash backs and self-defeating thoughts that exacerbate her pain problems. Improve conflict-resolution and distress tolerance skills.      The patient seems reasonably able to adhere to the treatment plan.        SAFETY CONSIDERATIONS:  The patient adamantly denies suicidal ideation or recent episodes of self-harm. She agreed to call the office and/or go to the ER if her psychiatric symptoms worsen. She agreed with the current safety plan.    Quality Measures:   Never smoker    Follow Up:   Return in about 3 days (around 8/18/2023) for Counseling Session.      Rubi Garduno, PhD, Temp Licensed Psychologist

## 2023-09-21 NOTE — PROGRESS NOTES
Video Visit      Patient Name: Sylvia Douglass  : 1980   MRN: [id number]   Start: 3:07  Stop: 3:47  Referring Physician: Lisset Velásquez MD    Chief Complaint:      ICD-10-CM ICD-9-CM   1. Attention deficit hyperactivity disorder (ADHD), other type  F90.8 314.01   2. Post-traumatic stress disorder, chronic  F43.12 309.81   3. Work-related stress  Z56.6 V62.1   4. Problems in relationship with spouse or partner  Z63.0 V61.8   5. History of migraine with aura  Z86.69 V12.49   6. Chronic neck and back pain  M54.2 723.1    M54.9 724.5    G89.29 338.29        This provider is located at [facility], using a secure [employer] Video Visit through [employer]. Sylvia Douglass is being seen remotely via telehealth at their home address in [place], and stated they are in a secure environment for this session. The patient's condition being diagnosed/treated is appropriate for telemedicine. I Rubi Garduno, PhD identified myself as well as my credentials.   The patient, and/or patients guardian, consent to be seen remotely, and when consent is given they understand that the consent allows for patient identifiable information to be sent to a third party as needed.   They may refuse to be seen remotely at any time. The electronic data is encrypted and password protected, and the patient and/or guardian has been advised of the potential risks to privacy not withstanding such measures.     You have chosen to receive care through a telehealth visit.  Do you consent to use a video/audio connection for your medical care today? YES.    History of Present Illness: Sylvia Douglass is a 43 y.o. female who I spoke with on video visit today for follow up care navigation and Cognitive Behavior Therapy. Ms. Douglass's medical history includes an asymptomatic vertex meningioma, migraine headaches, and chronic back and neck pain. She also has a psychiatric  history of trauma, anxiety, depression, ADHD, and stress within the home due to relationship problems. Past providers have included Sulma Carpenter LCSW, at a  clinic for several years. More recently, she was treated weekly by BAILEY Santos for relationship counseling and is transitioning to a new provider.    Subjective   Review of Systems:   Review of Systems    Patient History:   The following portions of the patient's history were reviewed and updated as appropriate: allergies, current medications, past family history, past medical history, past social history, past surgical history and problem list.     Social:  Sylvia was happy to report that she was able to schedule an intake appointment with Navjot Ordaz, a psychiatric nurse practitioner at [facility]. She states having more controlled anxiety with her current medication and that she only takes as needed.     Medications:     Current Outpatient Medications:     Alpha-Lipoic Acid (LIPOIC ACID PO), Take 300 mg by mouth., Disp: , Rfl:     ALPRAZolam (XANAX) 0.5 MG tablet, Take 1 tablet by mouth., Disp: , Rfl:     Ascorbic Acid (Vitamin C ER) 1000 MG tablet controlled-release, Take  by mouth., Disp: , Rfl:     bimatoprost (LATISSE) 0.03 % ophthalmic solution, Latisse 0.03 % eyelash drops  APPLY 1 DROP TO APPLICATOR AND APPLY TO UPPER EYELID, ALONG EYELASHES, BY TOPICAL ROUTE ONCE DAILY AT NIGHTTIME, Disp: , Rfl:     BIOTIN PO, biotin, Disp: , Rfl:     butalbital-acetaminophen-caffeine (FIORICET, ESGIC) -40 MG per tablet, , Disp: , Rfl:     Calcium Citrate-Vitamin D3 (CITRACAL) 315-6.25 MG-MCG tablet tablet, Take  by mouth Daily., Disp: , Rfl:     clindamycin (CLINDAGEL) 1 % gel, clindamycin 1 % topical gel  APPLY A THIN LAYER TO AREA OF BREAKOUTS ON FACE TWO TIMES A DAY, Disp: , Rfl:     Cyanocobalamin (Vitamin B 12) 100 MCG lozenge, Take 5,000 mcg by mouth., Disp: , Rfl:     cyclobenzaprine (FLEXERIL) 10 MG tablet, Take 1 tablet by mouth 3 (Three)  Times a Day., Disp: 15 tablet, Rfl: 0    DHEA 25 MG capsule, Take  by mouth., Disp: , Rfl:     Dymista 137-50 MCG/ACT suspension, , Disp: , Rfl:     EPINEPHrine (EPIPEN) 0.3 MG/0.3ML solution auto-injector injection, , Disp: , Rfl:     famotidine (PEPCID) 20 MG tablet, , Disp: , Rfl:     guaiFENesin (HUMIBID 3) 400 MG tablet, Take 1 tablet by mouth 6 (Six) Times a Day., Disp: , Rfl:     HYDROcodone-acetaminophen (NORCO) 7.5-325 MG per tablet, , Disp: , Rfl:     IRON, FERROUS SULFATE, PO, Take 60 mg by mouth., Disp: , Rfl:     l-methylfolate calcium (DEPLIN) 7.5 MG tablet tablet, Take 1 tablet by mouth Daily., Disp: , Rfl:     Loratadine 10 MG capsule, loratadine, Disp: , Rfl:     MAGNESIUM GLYCINATE PO, Take 200 mg by mouth., Disp: , Rfl:     Melatonin 1 MG capsule, Take 4 mg by mouth., Disp: , Rfl:     multivitamin with minerals (MULTIVITAMIN ADULT PO), Take 1 tablet by mouth Daily., Disp: , Rfl:     naloxone (NARCAN) 4 MG/0.1ML nasal spray, SMARTSIG:Both Nares, Disp: , Rfl:     NON FORMULARY, 1,000 mg. M-acetylcysteine, Disp: , Rfl:     Nutritional Supplements (VITAMIN D BOOSTER PO), Vitamin D2, Disp: , Rfl:     oxyCODONE (Roxicodone) 5 MG immediate release tablet, Take 1 tablet by mouth Every 4 (Four) Hours As Needed for Moderate Pain., Disp: 10 tablet, Rfl: 0    promethazine (PHENERGAN) 25 MG tablet, Take 1 tablet by mouth Every 6 (Six) Hours As Needed for Nausea or Vomiting., Disp: 10 tablet, Rfl: 0    pseudoephedrine (SUDAFED) 120 MG 12 hr tablet, Every 12 (Twelve) Hours., Disp: , Rfl:     rizatriptan (MAXALT) 10 MG tablet, , Disp: , Rfl:     sucralfate (CARAFATE) 1 g tablet, Take 1 tablet by mouth 4 (Four) Times a Day., Disp: , Rfl:     UBIQUINOL PO, Take 200 mg by mouth., Disp: , Rfl:     vitamin E 1000 UNIT capsule, Take 1 capsule by mouth Daily., Disp: , Rfl:     Zinc 23 MG lozenge, Dissolve 22 mg in the mouth., Disp: , Rfl:     Objective     Mental Status Exam:   Appearance: Normal  Hygiene:  "Adequate  Cooperation: Cooperative  Eye Contact: Fleeting  Psychomotor Behavior: Unremarkable  Mood: Euthymic  Affect: Appropriate  Speech: Normal   Thought Process: Normal   Thought Content: Appropriate   Suicidal: No SI indicated  Homicidal: No HI indicated  Hallucinations: None reported   Delusion: None reported  Memory: Not assessed  Orientation: Oriented x3  Reliability: Good  Insight: Fair  Judgement: Good  Impulse Control:  Less impulsive with ADHD medication    Assessment / Plan      Visit Diagnosis/Orders Placed This Visit:  Diagnoses and all orders for this visit:    1. Attention deficit hyperactivity disorder (ADHD), other type (Primary)    2. Post-traumatic stress disorder, chronic    3. Work-related stress    4. Problems in relationship with spouse or partner    5. History of migraine with aura    6. Chronic neck and back pain         Differential:   Anxiety Disorder  Major Depressive Disorder    Praised the patient for progress made with contacting and making an appointment with referral source. Analyzed the importance of compliance when taking stimulant medications and ways to reduce related frustration (e.g., good rapport with provider, telehealth, appointment reminders). Offered a suggestion to discuss whether or not she may benefit from taking an anti-depressant or mood stabilizer routinely by utilizing suggestions made from genetic testing. The patient reports having more control of her thoughts and emotions with therapy and voiced a preference to take a prescribed anxiolytic as needed. She states that, \"14 or 15 Xanax's will last me two months or longer\" but agreed to discuss with her provider. Reviewed ways to reduce nervousness and address symptoms more thoroughly by being mindful of their limited time together, being open to recommendations, using a list to stay on task, having records available for him to review, etc.    Treatment Plan/Goals:   Improve mood. Ameliorate or decrease the " frequency and severity of depression, anxiety, and panic by attending routine cognitive behavior therapy and/or medication management of her symptoms.  Verbalize healthy, realistic cognitive messages that promote harmony with others, self-acceptance, and confidence. Continue CBT to decrease the frequency of trauma-related flash backs and self-defeating thoughts that exacerbate her pain problems. Improve conflict-resolution and distress tolerance skills.   Maintain scheduled appointments with treatment providers, including therapists, primary care providers, specialists, etc.  Implement relaxation strategies in her daily routine to combat stress and make chronic pain more manageable.  Improve support system. Actively participate in individual and/or group psychotherapy routinely.     The patient seems reasonably able to adhere to the treatment plan.      Safety Considerations:  The patient adamantly and convincingly denies current suicidal or homicidal ideation or perceptual disturbance. Risk factors which would indicate the need for a higher level of care, include thoughts to harm self or others and/or self-harming behavior. The patient is to contact this office, call 911 or 988 for the Suicide and Crisis Lifeline, or present to the nearest emergency room should any of these events occur.     Quality Measures:   Never smoker      Follow Up:   Return in about 2 days (around 9/7/2023) for Video visit.      Rubi Garduno, PhD, Temp Licensed Psychologist

## 2023-09-21 NOTE — PROGRESS NOTES
Video Visit      Patient Name: Sylvia Douglass  : 1980   MRN: [id number]   Start: 4:23 Stop: 5:11  Referring Physician: Lisset Velásquez MD    Chief Complaint:      ICD-10-CM ICD-9-CM   1. Post-traumatic stress disorder, chronic  F43.12 309.81   2. Insomnia, unspecified type  G47.00 780.52   3. Attention deficit hyperactivity disorder (ADHD), other type  F90.8 314.01   4. History of migraine with aura  Z86.69 V12.49   5. Chronic neck and back pain  M54.2 723.1    M54.9 724.5    G89.29 338.29        This provider is located at [facility], using a secure [employer] Video Visit through [employer]. Sylvia Douglass is being seen remotely via telehealth at their home address in [place], and stated they are in a secure environment for this session. The patient's condition being diagnosed/treated is appropriate for telemedicine. I Rubi Garduno, PhD identified myself as well as my credentials.   The patient, and/or patients guardian, consent to be seen remotely, and when consent is given they understand that the consent allows for patient identifiable information to be sent to a third party as needed.   They may refuse to be seen remotely at any time. The electronic data is encrypted and password protected, and the patient and/or guardian has been advised of the potential risks to privacy not withstanding such measures.     You have chosen to receive care through a telehealth visit.  Do you consent to use a video/audio connection for your medical care today? YES.    History of Present Illness: Sylvia Douglass is a 43 y.o. female who I spoke with on a video visit today for follow up care navigation and supportive psychotherapy. Ms. Douglass has a history of an asymptomatic vertex meningioma, migraine headaches, back and neck pain. She has a psychiatric history of trauma, severe anxiety, and depression. Past providers have included  Sulma Carpenter LCSW, at a  clinic for several years and BAILEY Santos.       An evaluation for ADHD suggests that she has clinically significant difficulties with inattentiveness and impulsivity, but her symptoms do not meet all of the diagnostic criteria for ADHD. Ms. Douglass does report less impulsivity, more controlled thought, and improved focus with medication management of her symptoms.     Subjective   Review of Systems:   Review of Systems    Patient History:   The following portions of the patient's history were reviewed and updated as appropriate: allergies, current medications, past family history, past medical history, past social history, past surgical history and problem list.     Social:   Sylvia reports increased difficulties with sleep and an average of only four or five hours of sleep more recently. She also states that she has been more sensitive to the heat. 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      Medications:     Current Outpatient Medications:     Alpha-Lipoic Acid (LIPOIC ACID PO), Take 300 mg by mouth., Disp: , Rfl:     ALPRAZolam (XANAX) 0.5 MG tablet, Take 1 tablet by mouth., Disp: , Rfl:     Ascorbic Acid (Vitamin C ER) 1000 MG tablet controlled-release, Take  by mouth., Disp: , Rfl:     bimatoprost (LATISSE) 0.03 % ophthalmic solution, Latisse 0.03 % eyelash drops  APPLY 1 DROP TO APPLICATOR AND APPLY TO UPPER EYELID, ALONG EYELASHES, BY TOPICAL ROUTE ONCE DAILY AT NIGHTTIME, Disp: , Rfl:     BIOTIN PO, biotin, Disp: , Rfl:     butalbital-acetaminophen-caffeine (FIORICET, ESGIC) -40 MG per tablet, , Disp: , Rfl:     Calcium Citrate-Vitamin D3 (CITRACAL) 315-6.25 MG-MCG tablet tablet, Take  by mouth Daily., Disp: , Rfl:     clindamycin (CLINDAGEL) 1 % gel, clindamycin 1 % topical gel  APPLY A THIN LAYER TO AREA OF BREAKOUTS ON FACE TWO TIMES A DAY, Disp: , Rfl:     Cyanocobalamin (Vitamin  B 12) 100 MCG lozenge, Take 5,000 mcg by mouth., Disp: , Rfl:     cyclobenzaprine (FLEXERIL) 10 MG tablet, Take 1 tablet by mouth 3 (Three) Times a Day., Disp: 15 tablet, Rfl: 0    DHEA 25 MG capsule, Take  by mouth., Disp: , Rfl:     Dymista 137-50 MCG/ACT suspension, , Disp: , Rfl:     EPINEPHrine (EPIPEN) 0.3 MG/0.3ML solution auto-injector injection, , Disp: , Rfl:     famotidine (PEPCID) 20 MG tablet, , Disp: , Rfl:     guaiFENesin (HUMIBID 3) 400 MG tablet, Take 1 tablet by mouth 6 (Six) Times a Day., Disp: , Rfl:     HYDROcodone-acetaminophen (NORCO) 7.5-325 MG per tablet, , Disp: , Rfl:     IRON, FERROUS SULFATE, PO, Take 60 mg by mouth., Disp: , Rfl:     l-methylfolate calcium (DEPLIN) 7.5 MG tablet tablet, Take 1 tablet by mouth Daily., Disp: , Rfl:     Loratadine 10 MG capsule, loratadine, Disp: , Rfl:     MAGNESIUM GLYCINATE PO, Take 200 mg by mouth., Disp: , Rfl:     Melatonin 1 MG capsule, Take 4 mg by mouth., Disp: , Rfl:     multivitamin with minerals (MULTIVITAMIN ADULT PO), Take 1 tablet by mouth Daily., Disp: , Rfl:     naloxone (NARCAN) 4 MG/0.1ML nasal spray, SMARTSIG:Both Nares, Disp: , Rfl:     NON FORMULARY, 1,000 mg. M-acetylcysteine, Disp: , Rfl:     Nutritional Supplements (VITAMIN D BOOSTER PO), Vitamin D2, Disp: , Rfl:     oxyCODONE (Roxicodone) 5 MG immediate release tablet, Take 1 tablet by mouth Every 4 (Four) Hours As Needed for Moderate Pain., Disp: 10 tablet, Rfl: 0    promethazine (PHENERGAN) 25 MG tablet, Take 1 tablet by mouth Every 6 (Six) Hours As Needed for Nausea or Vomiting., Disp: 10 tablet, Rfl: 0    pseudoephedrine (SUDAFED) 120 MG 12 hr tablet, Every 12 (Twelve) Hours., Disp: , Rfl:     rizatriptan (MAXALT) 10 MG tablet, , Disp: , Rfl:     sucralfate (CARAFATE) 1 g tablet, Take 1 tablet by mouth 4 (Four) Times a Day., Disp: , Rfl:     UBIQUINOL PO, Take 200 mg by mouth., Disp: , Rfl:     vitamin E 1000 UNIT capsule, Take 1 capsule by mouth Daily., Disp: , Rfl:     Zinc  23 MG lozenge, Dissolve 22 mg in the mouth., Disp: , Rfl:     Objective     Mental Status Exam:   Appearance: Neat   Hygiene: Groomed. Wore makeup.  Cooperation: Cooperative  Eye Contact: Normal  Psychomotor Behavior: Unremarkable  Mood: Dysthymic   Affect: Appropriate  Speech: Normal  Thought Process: Normal   Thought Content: Congruent to mood  Suicidal: No SI indicated  Homicidal: No HI indicated  Hallucinations: None reported   Delusion: None reported  Memory: Grossly intact  Orientation: Oriented x3  Reliability: Good  Insight: Fair  Judgement: Good  Impulse Control: Improved    Assessment / Plan      Visit Diagnosis/Orders Placed This Visit:  Diagnoses and all orders for this visit:    1. Post-traumatic stress disorder, chronic (Primary)    2. Insomnia, unspecified type    3. Attention deficit hyperactivity disorder (ADHD), other type    4. History of migraine with aura    5. Chronic neck and back pain    Differential:   Depressive Disorder  Sleep-Wake Disorder    Praised the patient for her ability to cope and work despite sleep problems, increased fatigue, and dysthymia. Explored for factors that may be influential on sleep disruption (e.g., stimulant medication, pain, mental fatigue, anxious thinking, etc.). Identified a plan for her to discuss related concerns with her prescribing provider to rule out the possibility of overstimulation from ADHD medication. Reaffirmed her ability to cope by identifying alternative strategies that can promote more restful sleep (e.g., sleeping in a separate bed if needed, relaxation training, controlled temp, etc.).     Focused on positive experiences to improve mood. 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 Explored more about her talents of being bilingual and relating to some of the students. She agreed that teaching has also had a related benefit of learning more about her culture  of origin and how to speak and read Faroese more fluently.      Treatment Plan/Goals:   1. Stablize mood. Ameliorate or decrease the frequency and severity of depression, anxiety, and panic by attending routine cognitive behavior therapy and/or medication management.  2. Verbalize healthy, realistic cognitive messages that promote harmony with others, self-acceptance, and confidence.   3. Maintain scheduled appointments with treatment providers, including therapists, primary care providers, specialists, etc.  4. Implement relaxation strategies in her daily routine to combat stress and make chronic pain more manageable.  5. Improve support system. Actively participate in individual and/or group psychotherapy routinely.   6. Continue CBT. Decrease the frequency of trauma-related flash backs and self-defeating thoughts that exacerbate her pain problems. Improve conflict-resolution and distress tolerance skills.      The patient seems reasonably able to adhere to the treatment plan.      Safety Considerations:  The patient adamantly and convincingly denies current suicidal or homicidal ideation or perceptual disturbance. Risk factors which would indicate the need for a higher level of care, include thoughts to harm self or others and/or self-harming behavior. The patient is to contact this office, call 911 or 988 for the Suicide and Crisis Lifeline, or present to the nearest emergency room should any of these events occur.     Quality Measures:   Never smoker      Follow Up:   Return in about 2 days (around 8/25/2023) for Video visit.      Rubi Garduno, PhD, Temp Licensed Psychologist
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
## 2023-09-26 NOTE — PROGRESS NOTES
Video Visit      Patient Name: Sylvia Douglass  : 1980   MRN: [id number]   Start: 4:26  Stop: 5:12  Referring Physician: Lisset Velásquez MD    Chief Complaint:      ICD-10-CM ICD-9-CM   1. Attention deficit hyperactivity disorder (ADHD), other type  F90.8 314.01   2. Post-traumatic stress disorder, chronic  F43.12 309.81        This provider is located at [facility], using a secure [employer] Video Visit through [employer]. Sylvia Douglass is being seen remotely via telehealth at their home address in [place], and stated they are in a secure environment for this session. The patient's condition being diagnosed/treated is appropriate for telemedicine. I Rubi Garduno, PhD identified myself as well as my credentials.   The patient, and/or patients guardian, consent to be seen remotely, and when consent is given they understand that the consent allows for patient identifiable information to be sent to a third party as needed.   They may refuse to be seen remotely at any time. The electronic data is encrypted and password protected, and the patient and/or guardian has been advised of the potential risks to privacy not withstanding such measures.     You have chosen to receive care through a telehealth visit.  Do you consent to use a video/audio connection for your medical care today? YES.    History of Present Illness: Sylvia Douglass is a 43 y.o. female who I spoke with on video visit today for follow up care navigation and Cognitive Behavior Therapy. Ms. Douglass's medical history includes an asymptomatic vertex meningioma, migraine headaches, and chronic back and neck pain. She also has a psychiatric history of trauma, anxiety, depression, ADHD, and stress within the home due to relationship problems. Past providers have included Sulma Carpenter LCSW, at a  clinic for several years. More recently, she was treated weekly by  BAILEY Santos for relationship counseling and is transitioning to a new provider.     Subjective   Review of Systems:   Review of Systems    Patient History:   The following portions of the patient's history were reviewed and updated as appropriate: allergies, current medications, past family history, past medical history, past social history, past surgical history and problem list.     Social:  Sylvia reports being more productive at work and that co-workers have been complimentary of her. 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     Medications:     Current Outpatient Medications:     Alpha-Lipoic Acid (LIPOIC ACID PO), Take 300 mg by mouth., Disp: , Rfl:     ALPRAZolam (XANAX) 0.5 MG tablet, Take 1 tablet by mouth., Disp: , Rfl:     Ascorbic Acid (Vitamin C ER) 1000 MG tablet controlled-release, Take  by mouth., Disp: , Rfl:     bimatoprost (LATISSE) 0.03 % ophthalmic solution, Latisse 0.03 % eyelash drops  APPLY 1 DROP TO APPLICATOR AND APPLY TO UPPER EYELID, ALONG EYELASHES, BY TOPICAL ROUTE ONCE DAILY AT NIGHTTIME, Disp: , Rfl:     BIOTIN PO, biotin, Disp: , Rfl:     butalbital-acetaminophen-caffeine (FIORICET, ESGIC) -40 MG per tablet, , Disp: , Rfl:     Calcium Citrate-Vitamin D3 (CITRACAL) 315-6.25 MG-MCG tablet tablet, Take  by mouth Daily., Disp: , Rfl:     clindamycin (CLINDAGEL) 1 % gel, clindamycin 1 % topical gel  APPLY A THIN LAYER TO AREA OF BREAKOUTS ON FACE TWO TIMES A DAY, Disp: , Rfl:     Cyanocobalamin (Vitamin B 12) 100 MCG lozenge, Take 5,000 mcg by mouth., Disp: , Rfl:     cyclobenzaprine (FLEXERIL) 10 MG tablet, Take 1 tablet by mouth 3 (Three) Times a Day., Disp: 15 tablet, Rfl: 0    DHEA 25 MG capsule, Take  by mouth., Disp: , Rfl:     Dymista 137-50 MCG/ACT suspension, , Disp: , Rfl:     EPINEPHrine (EPIPEN) 0.3 MG/0.3ML solution auto-injector injection, , Disp: , Rfl:     famotidine (PEPCID) 20 MG  tablet, , Disp: , Rfl:     guaiFENesin (HUMIBID 3) 400 MG tablet, Take 1 tablet by mouth 6 (Six) Times a Day., Disp: , Rfl:     HYDROcodone-acetaminophen (NORCO) 7.5-325 MG per tablet, , Disp: , Rfl:     IRON, FERROUS SULFATE, PO, Take 60 mg by mouth., Disp: , Rfl:     l-methylfolate calcium (DEPLIN) 7.5 MG tablet tablet, Take 1 tablet by mouth Daily., Disp: , Rfl:     Loratadine 10 MG capsule, loratadine, Disp: , Rfl:     MAGNESIUM GLYCINATE PO, Take 200 mg by mouth., Disp: , Rfl:     Melatonin 1 MG capsule, Take 4 mg by mouth., Disp: , Rfl:     multivitamin with minerals (MULTIVITAMIN ADULT PO), Take 1 tablet by mouth Daily., Disp: , Rfl:     naloxone (NARCAN) 4 MG/0.1ML nasal spray, SMARTSIG:Both Nares, Disp: , Rfl:     NON FORMULARY, 1,000 mg. M-acetylcysteine, Disp: , Rfl:     Nutritional Supplements (VITAMIN D BOOSTER PO), Vitamin D2, Disp: , Rfl:     oxyCODONE (Roxicodone) 5 MG immediate release tablet, Take 1 tablet by mouth Every 4 (Four) Hours As Needed for Moderate Pain., Disp: 10 tablet, Rfl: 0    promethazine (PHENERGAN) 25 MG tablet, Take 1 tablet by mouth Every 6 (Six) Hours As Needed for Nausea or Vomiting., Disp: 10 tablet, Rfl: 0    pseudoephedrine (SUDAFED) 120 MG 12 hr tablet, Every 12 (Twelve) Hours., Disp: , Rfl:     rizatriptan (MAXALT) 10 MG tablet, , Disp: , Rfl:     sucralfate (CARAFATE) 1 g tablet, Take 1 tablet by mouth 4 (Four) Times a Day., Disp: , Rfl:     UBIQUINOL PO, Take 200 mg by mouth., Disp: , Rfl:     vitamin E 1000 UNIT capsule, Take 1 capsule by mouth Daily., Disp: , Rfl:     Zinc 23 MG lozenge, Dissolve 22 mg in the mouth., Disp: , Rfl:     Objective     Mental Status Exam:   Appearance: Neatly in a red dress   Hygiene: Well-groomed  Cooperation: Cooperative  Eye Contact: Normal   Psychomotor Behavior: Unremarkable  Mood: Euthymic  Affect: Appropriate  Speech: Clear  Thought Process: Normal   Thought Content: Appropriate   Suicidal: No SI indicated  Homicidal: No HI  indicated  Hallucinations: None reported   Delusion: None reported  Memory: Not assessed  Orientation: Oriented x3  Reliability: Good  Insight: Fair  Judgement: Good  Impulse Control:  Less impulsive with ADHD medication    Assessment / Plan      Visit Diagnosis/Orders Placed This Visit:  Diagnoses and all orders for this visit:    1. Attention deficit hyperactivity disorder (ADHD), other type (Primary)    2. Post-traumatic stress disorder, chronic    Differential:   Generalized Anxiety Disorder  Major Depressive Disorder    Initially, praised the patient for her progress made with vocational goals. Collaboratively identified the pros and cons of working full-time. The patient offered more insight into the importance of activity pacing and avoiding a tendency to over-exert herself. Also, analyzed her need to maintain fertility benefits with [employer] in order to accomplish a personal goal of pregnancy. Discussed ways to respectfully decline the position and clarified short-term goals. Treatment goals include getting bloodwork as recommended by Endocrinology and consulting with a fertility doctor in [place] next week.      Treatment Plan/Goals:   Maintain stable mood. Ameliorate or decrease the frequency and severity of depression, anxiety, and panic by attending routine cognitive behavior therapy and/or medication management of her symptoms.  Verbalize healthy, realistic cognitive messages that promote harmony with others, self-acceptance, and confidence. Continue CBT to decrease the frequency of trauma-related flash backs and self-defeating thoughts that exacerbate her pain problems. Improve conflict-resolution and distress tolerance skills.   Maintain scheduled appointments with treatment providers, including therapists, primary care providers, specialists, etc.  Implement relaxation strategies in her daily routine to combat stress and make chronic pain more manageable.  Improve support system. Actively  participate in individual and/or group psychotherapy routinely.      The patient seems reasonably able to adhere to the treatment plan.      Safety Considerations:  The patient adamantly and convincingly denies current suicidal or homicidal ideation or perceptual disturbance. Risk factors which would indicate the need for a higher level of care, include thoughts to harm self or others and/or self-harming behavior. The patient is to contact this office, call 911 or 988 for the Suicide and Crisis Lifeline, or present to the nearest emergency room should any of these events occur.      Quality Measures:   Never smoker      Follow Up:   Return in about 1 week (around 9/14/2023) for Video visit, Counseling Session.      Rubi Garduno, PhD, Temp Licensed Psychologist

## 2023-09-26 NOTE — PROGRESS NOTES
Video Visit      Patient Name: Sylvia Douglass  : 1980   MRN: [id number]   Start: 4:21 Stop: 5:09  Referring Physician: Lisset Velásquez MD    Chief Complaint:      ICD-10-CM ICD-9-CM   1. Post-traumatic stress disorder, chronic  F43.12 309.81   2. Attention deficit hyperactivity disorder (ADHD), other type  F90.8 314.01   3. Problems in relationship with spouse or partner  Z63.0 V61.8        This provider is located at [facility], using a secure [employer] Video Visit through [employer]. Sylvia Douglass is being seen remotely via telehealth at their home address in [place], and stated they are in a secure environment for this session. The patient's condition being diagnosed/treated is appropriate for telemedicine. I Rubi Garduno, PhD identified myself as well as my credentials.   The patient, and/or patients guardian, consent to be seen remotely, and when consent is given they understand that the consent allows for patient identifiable information to be sent to a third party as needed.   They may refuse to be seen remotely at any time. The electronic data is encrypted and password protected, and the patient and/or guardian has been advised of the potential risks to privacy not withstanding such measures.     You have chosen to receive care through a telehealth visit.  Do you consent to use a video/audio connection for your medical care today? YES.    History of Present Illness: Sylvia Douglass is a 43 y.o. female who I spoke with on video visit today for follow up psychotherapy and support. Ms. Douglass's medical history includes an asymptomatic vertex meningioma, migraine headaches, and chronic back and neck pain. She also has a psychiatric history of trauma, anxiety, depression, ADHD, and stress within the home due to relationship problems. Past providers have included Sulma Carpenter LCSW, at a  clinic for several years.  "More recently, she was treated weekly by BAILEY Santos for relationship counseling and is transitioning to a new provider.     Subjective     Patient History:   The following portions of the patient's history were reviewed and updated as appropriate: allergies, current medications, past family history, past medical history, past social history, past surgical history and problem list.     Social:   Sylvia reports that she attempted to avoid conflict by not bringing up her concerns with her boyfriend. She states that they still had an argument related to his harsh criticism of her. She reports that \"he's pushing me on purpose\" and that \"he keeps saying that I'm lazy\".      Medications:     Current Outpatient Medications:     Alpha-Lipoic Acid (LIPOIC ACID PO), Take 300 mg by mouth., Disp: , Rfl:     ALPRAZolam (XANAX) 0.5 MG tablet, Take 1 tablet by mouth., Disp: , Rfl:     Ascorbic Acid (Vitamin C ER) 1000 MG tablet controlled-release, Take  by mouth., Disp: , Rfl:     bimatoprost (LATISSE) 0.03 % ophthalmic solution, Latisse 0.03 % eyelash drops  APPLY 1 DROP TO APPLICATOR AND APPLY TO UPPER EYELID, ALONG EYELASHES, BY TOPICAL ROUTE ONCE DAILY AT NIGHTTIME, Disp: , Rfl:     BIOTIN PO, biotin, Disp: , Rfl:     butalbital-acetaminophen-caffeine (FIORICET, ESGIC) -40 MG per tablet, , Disp: , Rfl:     Calcium Citrate-Vitamin D3 (CITRACAL) 315-6.25 MG-MCG tablet tablet, Take  by mouth Daily., Disp: , Rfl:     clindamycin (CLINDAGEL) 1 % gel, clindamycin 1 % topical gel  APPLY A THIN LAYER TO AREA OF BREAKOUTS ON FACE TWO TIMES A DAY, Disp: , Rfl:     Cyanocobalamin (Vitamin B 12) 100 MCG lozenge, Take 5,000 mcg by mouth., Disp: , Rfl:     cyclobenzaprine (FLEXERIL) 10 MG tablet, Take 1 tablet by mouth 3 (Three) Times a Day., Disp: 15 tablet, Rfl: 0    DHEA 25 MG capsule, Take  by mouth., Disp: , Rfl:     Dymista 137-50 MCG/ACT suspension, , Disp: , Rfl:     EPINEPHrine (EPIPEN) 0.3 MG/0.3ML solution " auto-injector injection, , Disp: , Rfl:     famotidine (PEPCID) 20 MG tablet, , Disp: , Rfl:     guaiFENesin (HUMIBID 3) 400 MG tablet, Take 1 tablet by mouth 6 (Six) Times a Day., Disp: , Rfl:     HYDROcodone-acetaminophen (NORCO) 7.5-325 MG per tablet, , Disp: , Rfl:     IRON, FERROUS SULFATE, PO, Take 60 mg by mouth., Disp: , Rfl:     l-methylfolate calcium (DEPLIN) 7.5 MG tablet tablet, Take 1 tablet by mouth Daily., Disp: , Rfl:     Loratadine 10 MG capsule, loratadine, Disp: , Rfl:     MAGNESIUM GLYCINATE PO, Take 200 mg by mouth., Disp: , Rfl:     Melatonin 1 MG capsule, Take 4 mg by mouth., Disp: , Rfl:     multivitamin with minerals (MULTIVITAMIN ADULT PO), Take 1 tablet by mouth Daily., Disp: , Rfl:     naloxone (NARCAN) 4 MG/0.1ML nasal spray, SMARTSIG:Both Nares, Disp: , Rfl:     NON FORMULARY, 1,000 mg. M-acetylcysteine, Disp: , Rfl:     Nutritional Supplements (VITAMIN D BOOSTER PO), Vitamin D2, Disp: , Rfl:     oxyCODONE (Roxicodone) 5 MG immediate release tablet, Take 1 tablet by mouth Every 4 (Four) Hours As Needed for Moderate Pain., Disp: 10 tablet, Rfl: 0    promethazine (PHENERGAN) 25 MG tablet, Take 1 tablet by mouth Every 6 (Six) Hours As Needed for Nausea or Vomiting., Disp: 10 tablet, Rfl: 0    pseudoephedrine (SUDAFED) 120 MG 12 hr tablet, Every 12 (Twelve) Hours., Disp: , Rfl:     rizatriptan (MAXALT) 10 MG tablet, , Disp: , Rfl:     sucralfate (CARAFATE) 1 g tablet, Take 1 tablet by mouth 4 (Four) Times a Day., Disp: , Rfl:     UBIQUINOL PO, Take 200 mg by mouth., Disp: , Rfl:     vitamin E 1000 UNIT capsule, Take 1 capsule by mouth Daily., Disp: , Rfl:     Zinc 23 MG lozenge, Dissolve 22 mg in the mouth., Disp: , Rfl:     Objective     Mental Status Exam:   Appearance: Neat   Hygiene: Clean  Cooperation: Cooperative  Eye Contact: Fleeting   Psychomotor Behavior: Mild agitation  Mood: Dysphoric, Angry at times  Affect: Appropriate  Speech: Coherent  Thought Process: Circumstantial  "  Thought Content: Congruent to mood  Suicidal: No SI indicated  Homicidal: No HI indicated  Hallucinations: None reported   Delusion: None reported  Memory: Not assessed  Orientation: Oriented x3  Reliability: Good  Insight: Fair  Judgement: Good  Impulse Control: Influenced by mood    Assessment / Plan      Visit Diagnosis/Orders Placed This Visit:  Diagnoses and all orders for this visit:    1. Post-traumatic stress disorder, chronic (Primary)    2. Attention deficit hyperactivity disorder (ADHD), other type    3. Problems in relationship with spouse or partner    Differential:   Generalized Anxiety Disorder  Personality Disorder    Empathic listening was used to validate the patient's feelings of frustration. Collaboratively identified behaviors that continue to contribute to the conflict (e.g., poor communication, defensiveness, finances, etc.) . Analyzed how her boyfriend's refusal to participate in therapy and rigid beliefs were also counterintuitive.     Instead, she was encouraged to focus more on self-control and learning to respond less emotionally to conflict to cope more effectively. Challenged her view of his criticism that she \"only is dressing up for other people\" to a more realistic perspective that she is taking \"more pride in her appearance\".     The patient was also recommended to utilize a DBT technique of doing the opposite of what she would normally do during an argument. Analyzed the potential benefits of being compassionate versus angry. She reports have little success with doing so in the past. Reiterated the importance of non-verbal communication (e.g., tone/volume of voice, body language, etc.) and learning to prioritize time to discuss concerns, rather than arguing about problems when they occur.       Treatment Plan/Goals:   Stabilize mood. Ameliorate or decrease the frequency and severity of depression, anxiety, and panic by attending routine cognitive behavior therapy and/or " medication management of her symptoms.  Verbalize healthy, realistic cognitive messages that promote harmony with others, self-acceptance, and confidence. Continue CBT to decrease the frequency of trauma-related flash backs and self-defeating thoughts that exacerbate her pain problems. Improve conflict-resolution and distress tolerance skills.   Maintain scheduled appointments with treatment providers, including therapists, primary care providers, specialists, etc.  Implement relaxation strategies in her daily routine to combat stress and make chronic pain more manageable.  Improve support system. Actively participate in individual and/or group psychotherapy routinely.      The patient seems reasonably able to adhere to the treatment plan.     Quality Measures:   Never smoker    I advised Sylvia Douglass of the risks of tobacco use.     Follow Up:   Return in about 1 day (around 9/13/2023) for Video visit.      Rubi Garduno, PhD, Temp Licensed Psychologist

## 2023-09-26 NOTE — PROGRESS NOTES
Video Visit      Patient Name: Sylvia Douglass  : 1980   MRN: [id number]   Start: 4:07  Stop: 4:51  Referring Physician: Lisset Velásquez MD    Chief Complaint:      ICD-10-CM ICD-9-CM   1. Post-traumatic stress disorder, chronic  F43.12 309.81   2. Attention deficit hyperactivity disorder (ADHD), other type  F90.8 314.01   3. Chronic neck and back pain  M54.2 723.1    M54.9 724.5    G89.29 338.29   4. Stress at home  F43.9 V61.9        This provider is located at [facility], using a secure [employer] Video Visit through [employer]. Sylvia Douglass is being seen remotely via telehealth at their home address in [place], and stated they are in a secure environment for this session. The patient's condition being diagnosed/treated is appropriate for telemedicine. I Rubi Garduno, PhD identified myself as well as my credentials.   The patient, and/or patients guardian, consent to be seen remotely, and when consent is given they understand that the consent allows for patient identifiable information to be sent to a third party as needed.   They may refuse to be seen remotely at any time. The electronic data is encrypted and password protected, and the patient and/or guardian has been advised of the potential risks to privacy not withstanding such measures.     You have chosen to receive care through a telehealth visit.  Do you consent to use a video/audio connection for your medical care today? YES.    History of Present Illness: Sylvia Douglass is a 43 y.o. female who I spoke with on video visit today for follow up psychotherapy. Ms. Douglass's medical history includes an asymptomatic vertex meningioma, migraine headaches, and chronic back and neck pain. She also has a psychiatric history of trauma, anxiety, depression, ADHD, and stress within the home due to relationship problems. Past providers have included Sulma Carpenter LCSW, at  "a  clinic for several years. More recently, she was treated weekly by BAILEY Santos for relationship counseling and is transitioning to a new provider.    Subjective   Review of Systems:     Patient History:   The following portions of the patient's history were reviewed and updated as appropriate: allergies, current medications, past family history, past medical history, past social history, past surgical history and problem list.     Social:  Sylvia states that, \"I've been taking medicine all morning\", but she can't seem to get relief from the pain in her neck, down her arm and shoulder. She reports that her pain medication cannot be filled until the morning.     Medications:     Current Outpatient Medications:     Alpha-Lipoic Acid (LIPOIC ACID PO), Take 300 mg by mouth., Disp: , Rfl:     ALPRAZolam (XANAX) 0.5 MG tablet, Take 1 tablet by mouth., Disp: , Rfl:     bimatoprost (LATISSE) 0.03 % ophthalmic solution, Latisse 0.03 % eyelash drops  APPLY 1 DROP TO APPLICATOR AND APPLY TO UPPER EYELID, ALONG EYELASHES, BY TOPICAL ROUTE ONCE DAILY AT NIGHTTIME, Disp: , Rfl:     Biotin 81528 MCG tablet dispersible, , Disp: , Rfl:     BIOTIN PO, biotin, Disp: , Rfl:     butalbital-acetaminophen-caffeine (FIORICET, ESGIC) -40 MG per tablet, , Disp: , Rfl:     Calcium Citrate-Vitamin D3 (CITRACAL) 315-6.25 MG-MCG tablet tablet, Take  by mouth Daily., Disp: , Rfl:     clindamycin (CLINDAGEL) 1 % gel, clindamycin 1 % topical gel  APPLY A THIN LAYER TO AREA OF BREAKOUTS ON FACE TWO TIMES A DAY, Disp: , Rfl:     Cyanocobalamin (Vitamin B 12) 100 MCG lozenge, Take 5,000 mcg by mouth., Disp: , Rfl:     cyclobenzaprine (FLEXERIL) 10 MG tablet, Take 1 tablet by mouth 3 (Three) Times a Day., Disp: 15 tablet, Rfl: 0    DHEA 25 MG capsule, Take  by mouth., Disp: , Rfl:     Dymista 137-50 MCG/ACT suspension, , Disp: , Rfl:     EPINEPHrine (EPIPEN) 0.3 MG/0.3ML solution auto-injector injection, , Disp: , Rfl:     " famotidine (PEPCID) 20 MG tablet, , Disp: , Rfl:     guaiFENesin (HUMIBID 3) 400 MG tablet, Take 1 tablet by mouth 6 (Six) Times a Day., Disp: , Rfl:     HYDROcodone-acetaminophen (NORCO) 7.5-325 MG per tablet, , Disp: , Rfl:     Hydrocortisone 2.5 % kit, APPLY A THIN LAYER TO AFFECTED AREA(S) IF UNDER ARMS ARE IRRITATED TWO TIMES A DAY FOR UP TO TWO WEEKS AS NEEDED FLARES, Disp: , Rfl:     l-methylfolate calcium (DEPLIN) 7.5 MG tablet tablet, Take 1 tablet by mouth Daily., Disp: , Rfl:     Loratadine 10 MG capsule, loratadine, Disp: , Rfl:     MAGNESIUM GLYCINATE PO, Take 200 mg by mouth., Disp: , Rfl:     Melatonin 1 MG capsule, Take 4 mg by mouth., Disp: , Rfl:     multivitamin with minerals tablet tablet, Take 1 tablet by mouth Daily., Disp: , Rfl:     naloxone (NARCAN) 4 MG/0.1ML nasal spray, SMARTSIG:Both Nares, Disp: , Rfl:     NON FORMULARY, 1,000 mg. M-acetylcysteine, Disp: , Rfl:     Nutritional Supplements (VITAMIN D BOOSTER PO), Vitamin D2, Disp: , Rfl:     oxyCODONE (Roxicodone) 5 MG immediate release tablet, Take 1 tablet by mouth Every 4 (Four) Hours As Needed for Moderate Pain., Disp: 10 tablet, Rfl: 0    promethazine (PHENERGAN) 25 MG tablet, Take 1 tablet by mouth Every 6 (Six) Hours As Needed for Nausea or Vomiting., Disp: 10 tablet, Rfl: 0    pseudoephedrine (SUDAFED) 120 MG 12 hr tablet, Every 12 (Twelve) Hours., Disp: , Rfl:     rizatriptan (MAXALT) 10 MG tablet, , Disp: , Rfl:     sucralfate (CARAFATE) 1 g tablet, Take 1 tablet by mouth 4 (Four) Times a Day., Disp: , Rfl:     UBIQUINOL PO, Take 200 mg by mouth., Disp: , Rfl:     Zinc 23 MG lozenge, Dissolve 22 mg in the mouth., Disp: , Rfl:     Objective     Mental Status Exam:   Appearance: Normal  Hygiene: Fair  Cooperation: Cooperative  Eye Contact: Fleeting  Psychomotor Behavior: Agitated  Mood: More irritable due to pain  Affect: Appropriate  Speech: Clear  Thought Process: Anxiety-provoking  Thought Content: Congruent to mood  Suicidal:  No SI indicated  Homicidal: No HI indicated  Hallucinations: None reported  Delusion: None reported   Memory: Grossly intact  Orientation: Orientated x3  Reliability: Good  Insight: Fair  Judgement: Fair to Good  Impulse Control: Influenced by increased pain     Assessment / Plan      Visit Diagnosis/Orders Placed This Visit:  Diagnoses and all orders for this visit:    1. Post-traumatic stress disorder, chronic (Primary)    2. Attention deficit hyperactivity disorder (ADHD), other type    3. Chronic neck and back pain    4. Stress at home       Differential:   Generalized Anxiety Disorder  Personality Disorder    Initially, explored factors influential on her increased pain. The patient offered some insight into how being busy at work and more physically active was likely causative. Reviewed the importance of body mechanics and activity pacing for chronic pain. She agreed about the necessity of self-care to cope and reported plans to go to bed earlier and/or to sleep in a separate room to get a better quality of sleep. She also reports some progress with not initiating arguments with Paul, which often triggers muscle tension.      Offered some ideas for how prior preparation can help to reduce related stress and may make pain more manageable.  For example, she scheduled two doctors appointments tomorrow and one is her fertility consultation appointment in [place]. In case of a set back or severe pain, collaboratively identified a plan for her to prioritize attending the most important appointment, if not both. Will follow up tomorrow about her consultation with the fertility specialist.      Treatment Plan/Goals:   Stabilize mood. Ameliorate or decrease the frequency and severity of depression, anxiety, and panic by attending routine cognitive behavior therapy and/or medication management of her symptoms.  Verbalize healthy, realistic cognitive messages that promote harmony with others, self-acceptance, and  confidence. Continue CBT to decrease the frequency of trauma-related flash backs and self-defeating thoughts that exacerbate her pain problems. Improve conflict-resolution and distress tolerance skills.   Maintain scheduled appointments with treatment providers, including therapists, primary care providers, specialists, etc.  Implement relaxation strategies in her daily routine to combat stress and make chronic pain more manageable.  Improve support system. Actively participate in individual and/or group psychotherapy routinely.      The patient seems reasonably able to adhere to the treatment plan.      SAFETY CONSIDERATIONS:    The patient adamantly and convincingly denies current suicidal or homicidal ideation or perceptual disturbance. Risk factors which would indicate the need for a higher level of care, including thoughts to harm self or others and/or self-harming behavior. The patient is to contact this office, call 911 or [phone] for the Suicide and Crisis Lifeline, or present to the nearest emergency room should any of these events occur.     Quality Measures:   Never smoker      Follow Up:   Return in about 1 day (around 9/12/2023) for Video visit.      Rubi Garduno, PhD, Temp Licensed Psychologist

## 2023-09-26 NOTE — PROGRESS NOTES
Video Visit      Patient Name: Sylvia Douglass  : 1980   MRN: [id number]   Start: 4:42  Stop: 5:38  Referring Physician: Lisset Velásquez MD    Chief Complaint:  No diagnosis found.     This provider is located at [facility], using a secure MyChart Video Visit through [employer]. Sylvia Douglass is being seen remotely via telehealth at their home address in [place], and stated they are in a secure environment for this session. The patient's condition being diagnosed/treated is appropriate for telemedicine. I Rubi Garduno, PhD identified myself as well as my credentials.   The patient, and/or patients guardian, consent to be seen remotely, and when consent is given they understand that the consent allows for patient identifiable information to be sent to a third party as needed.   They may refuse to be seen remotely at any time. The electronic data is encrypted and password protected, and the patient and/or guardian has been advised of the potential risks to privacy not withstanding such measures.     You have chosen to receive care through a telehealth visit.  Do you consent to use a video/audio connection for your medical care today? YES.    History of Present Illness: Sylvia Douglass is a 43 y.o. female who I spoke with on video visit today for a crisis appointment as requested. Ms. Douglass's medical history includes an asymptomatic vertex meningioma, migraine headaches, and chronic back and neck pain. She also has a psychiatric history of trauma, anxiety, depression, ADHD, and stress within the home due to relationship problems. Past providers have included Sulma Carpenter LCSW, at a  clinic for several years. More recently, she was treated weekly by BAILEY Santos for relationship counseling and is transitioning to a new provider.    Subjective     Patient History:   The following portions of the patient's history  "were reviewed and updated as appropriate: allergies, current medications, past family history, past medical history, past social history, past surgical history and problem list.     Social:  Sylvia presents with a high level of distress and was in tears. She reports having recurrent thoughts that \"I'm not good enough for Paul\" and fears related to him being home alone.     Medications:     Current Outpatient Medications:     Alpha-Lipoic Acid (LIPOIC ACID PO), Take 300 mg by mouth., Disp: , Rfl:     ALPRAZolam (XANAX) 0.5 MG tablet, Take 1 tablet by mouth., Disp: , Rfl:     Ascorbic Acid (Vitamin C ER) 1000 MG tablet controlled-release, Take  by mouth., Disp: , Rfl:     bimatoprost (LATISSE) 0.03 % ophthalmic solution, Latisse 0.03 % eyelash drops  APPLY 1 DROP TO APPLICATOR AND APPLY TO UPPER EYELID, ALONG EYELASHES, BY TOPICAL ROUTE ONCE DAILY AT NIGHTTIME, Disp: , Rfl:     BIOTIN PO, biotin, Disp: , Rfl:     butalbital-acetaminophen-caffeine (FIORICET, ESGIC) -40 MG per tablet, , Disp: , Rfl:     Calcium Citrate-Vitamin D3 (CITRACAL) 315-6.25 MG-MCG tablet tablet, Take  by mouth Daily., Disp: , Rfl:     clindamycin (CLINDAGEL) 1 % gel, clindamycin 1 % topical gel  APPLY A THIN LAYER TO AREA OF BREAKOUTS ON FACE TWO TIMES A DAY, Disp: , Rfl:     Cyanocobalamin (Vitamin B 12) 100 MCG lozenge, Take 5,000 mcg by mouth., Disp: , Rfl:     cyclobenzaprine (FLEXERIL) 10 MG tablet, Take 1 tablet by mouth 3 (Three) Times a Day., Disp: 15 tablet, Rfl: 0    DHEA 25 MG capsule, Take  by mouth., Disp: , Rfl:     Dymista 137-50 MCG/ACT suspension, , Disp: , Rfl:     EPINEPHrine (EPIPEN) 0.3 MG/0.3ML solution auto-injector injection, , Disp: , Rfl:     famotidine (PEPCID) 20 MG tablet, , Disp: , Rfl:     guaiFENesin (HUMIBID 3) 400 MG tablet, Take 1 tablet by mouth 6 (Six) Times a Day., Disp: , Rfl:     HYDROcodone-acetaminophen (NORCO) 7.5-325 MG per tablet, , Disp: , Rfl:     IRON, FERROUS SULFATE, PO, Take 60 mg by " mouth., Disp: , Rfl:     l-methylfolate calcium (DEPLIN) 7.5 MG tablet tablet, Take 1 tablet by mouth Daily., Disp: , Rfl:     Loratadine 10 MG capsule, loratadine, Disp: , Rfl:     MAGNESIUM GLYCINATE PO, Take 200 mg by mouth., Disp: , Rfl:     Melatonin 1 MG capsule, Take 4 mg by mouth., Disp: , Rfl:     multivitamin with minerals (MULTIVITAMIN ADULT PO), Take 1 tablet by mouth Daily., Disp: , Rfl:     naloxone (NARCAN) 4 MG/0.1ML nasal spray, SMARTSIG:Both Nares, Disp: , Rfl:     NON FORMULARY, 1,000 mg. M-acetylcysteine, Disp: , Rfl:     Nutritional Supplements (VITAMIN D BOOSTER PO), Vitamin D2, Disp: , Rfl:     oxyCODONE (Roxicodone) 5 MG immediate release tablet, Take 1 tablet by mouth Every 4 (Four) Hours As Needed for Moderate Pain., Disp: 10 tablet, Rfl: 0    promethazine (PHENERGAN) 25 MG tablet, Take 1 tablet by mouth Every 6 (Six) Hours As Needed for Nausea or Vomiting., Disp: 10 tablet, Rfl: 0    pseudoephedrine (SUDAFED) 120 MG 12 hr tablet, Every 12 (Twelve) Hours., Disp: , Rfl:     rizatriptan (MAXALT) 10 MG tablet, , Disp: , Rfl:     sucralfate (CARAFATE) 1 g tablet, Take 1 tablet by mouth 4 (Four) Times a Day., Disp: , Rfl:     UBIQUINOL PO, Take 200 mg by mouth., Disp: , Rfl:     vitamin E 1000 UNIT capsule, Take 1 capsule by mouth Daily., Disp: , Rfl:     Zinc 23 MG lozenge, Dissolve 22 mg in the mouth., Disp: , Rfl:     Objective     Mental Status Exam:   Appearance: Dissheveled   Hygiene: Fair  Cooperation: Cooperative  Eye Contact: Fleeting   Psychomotor Behavior: Moderate agitation  Mood: Anxious  Affect: Appropriate  Speech: Fast  Thought Process: Circumstantial   Thought Content: Congruent to mood  Suicidal: No SI indicated  Homicidal: No HI indicated  Hallucinations: None reported   Delusion: None reported  Memory: Not assessed  Orientation: Oriented x3  Reliability: Good  Insight: Fair  Judgement: Good  Impulse Control: Influenced by anxiety  Assessment / Plan      Visit Diagnosis/Orders  "Placed This Visit:  There are no diagnoses linked to this encounter.     Differential:   Generalized Anxiety Disorder    Empathic listening was used to clarify patient concerns. The patient offered some insight into how her boyfriend's comments about her were inaccurate and hurtful. With prompting, she was able to identify an action (her boyfriend leaving the door unlocked), the underlying thought that 'he must have been masturbating', which triggered feelings of anger/fear/anxiety, and led to her response (panic attack, wanting to go off on him, etc.).     Explored underlying beliefs and past experiences that contribute to her negative thinking related to masturbation. Analyzed how \"catching him watching porn before\" and her beliefs related to emotional cheating contribute to her dysfunctional beliefs about self - that she is \"not good enough\". The patient offered more insight into how negative experiences with men from the past were influential (e.g., her first boyfriend, finding family member's pornography, etc.). Validated her experience and offered other possible factors, including cultural beliefs related to masturbation (e.g., sinful, shameful).     The patient was challenged to consider alternative ways of viewing her situation, that masturbation can be normal and healthy for the relationship within reason. Analyzed how criticism of one another and distrust were overarching problems that will continue to destroy the relationship. She agreed that confronting her boyfriend aggressively would not be helpful and likely result with conflict. Collaboratively identified a more appropriate plan for her to not respond by overreacting and arguing, but by addressing concerns calmly and being more assertive about her needs. By doing so, she appeared less anxious by the end of her session.     Treatment Plan/Goals:   Stabilize mood. Ameliorate or decrease the frequency and severity of depression, anxiety, and panic by " attending routine cognitive behavior therapy and/or medication management of her symptoms.  Verbalize healthy, realistic cognitive messages that promote harmony with others, self-acceptance, and confidence. Continue CBT to decrease the frequency of trauma-related flash backs and self-defeating thoughts that exacerbate her pain problems. Improve conflict-resolution and distress tolerance skills.   Maintain scheduled appointments with treatment providers, including therapists, primary care providers, specialists, etc.  Implement relaxation strategies in her daily routine to combat stress and make chronic pain more manageable.  Improve support system. Actively participate in individual and/or group psychotherapy routinely.      The patient seems reasonably able to adhere to the treatment plan.     Quality Measures:   Never smoker      Follow Up:   Return for Video visit.      Rubi Garduno, PhD, Temp Licensed Psychologist

## 2023-09-27 ENCOUNTER — TELEPHONE (OUTPATIENT)
Dept: NEUROSURGERY | Facility: CLINIC | Age: 43
End: 2023-09-27
Payer: COMMERCIAL

## 2023-09-27 NOTE — TELEPHONE ENCOUNTER
Called and spoke to Sylvia. She is going to see if her PCP/Pain dr can order an MRI and she will fu with me once complete so that I can get her in with current imaging. Offered to have her scheduled with a PA/no imaging- We are going to try to get her some current imaging prior to coming in so that it will save her time. She will call me once complete so that I can get her added on schedule.

## 2023-09-27 NOTE — TELEPHONE ENCOUNTER
Provider: STEPHENIE    Caller: PATIENT    Pharmacy: [facility] [id number] - [address] - [phone] [facility] [phone]  [phone]     Phone Number: [phone]    Reason for Call: PATIENT STATES SHE IS HAVING INCREASED PAIN IN HER NECK AND SHOULDER BLADES, SHE IS ALSO EXPERIENCING NUMBNESS IN HER HAND . PATIENT STATES THE PAIN STARTED 09/14/23. PATIENT STATES IT FEELS MORE MUSCULAR. HER NECK PAIN IS ALSO CAUSING MIGRAINES.    When was the patient last seen: 06/29/22    When did it start: 09/14/23    Where is it located: NECK AND SHOULDER BLADES, SHE IS ALSO EXPERIENCING NUMBNESS IN HER HAND    Characteristics of symptom/severity: 9/10    Timing- Is it constant or intermittent: CONSTANT    What makes it worse: NOTHING    What makes it better: PAIN MEDICATION - BUT NOT MUCH     What therapies/medications have you tried: HYDROCODONE - DOES NOT HELP MUCH WITH PAIN, HEAT - LOOSENS THE AREA, DOES NOT HELP WITH PAIN, ICE - MAKES IT WORSE, FLEXERIL - DOES NOT HELP MUCH, LIQUID IBUPROFEN - DID NOT HELP AT ALL      PATIENT STATES SHE WAS UNABLE TO SEND MESSAGE TO STEPHENIE THROUGH [employer], HE DID NOT SHOW UP ON AVAILABLE PROVIDERS TO SEND MESSAGE TO.PATIENT WAS TOLD TO CALL STEPHENIE TO SEE WHAT SHE NEEDS TO DO. PATIENT WOULD LIKE TO KNOW IF SHE NEEDS NEW IMAGING    PLEASE CALL PATIENT TO ADVISE.    THANK YOU

## 2023-10-03 ENCOUNTER — TELEPHONE (OUTPATIENT)
Dept: NEUROSURGERY | Facility: CLINIC | Age: 43
End: 2023-10-03
Payer: COMMERCIAL

## 2023-10-03 NOTE — PROGRESS NOTES
Video Visit      Patient Name: Sylvia Douglass  : 1980   MRN: [id number]   Start: 4:15 Stop: 5:01  Referring Physician: Lisset Velásquez MD    Chief Complaint:      ICD-10-CM ICD-9-CM   1. Post-traumatic stress disorder, chronic  F43.12 309.81   2. Chronic neck and back pain  M54.2 723.1    M54.9 724.5    G89.29 338.29   3. History of migraine with aura  Z86.69 V12.49   4. Attention deficit hyperactivity disorder (ADHD), other type  F90.8 314.01   5. Problems in relationship with spouse or partner  Z63.0 V61.8   6. Work-related stress  Z56.6 V62.1        This provider is located at [facility], using a secure [employer] Video Visit through [employer]. Sylvia Douglass is being seen remotely via telehealth at their home address in [place], and stated they are in a secure environment for this session. The patient's condition being diagnosed/treated is appropriate for telemedicine. I Rubi Garduno, PhD identified myself as well as my credentials.   The patient, and/or patients guardian, consent to be seen remotely, and when consent is given they understand that the consent allows for patient identifiable information to be sent to a third party as needed.   They may refuse to be seen remotely at any time. The electronic data is encrypted and password protected, and the patient and/or guardian has been advised of the potential risks to privacy not withstanding such measures.     You have chosen to receive care through a telehealth visit.  Do you consent to use a video/audio connection for your medical care today? YES.  This visit complies with patient safety concerns in accordance with CDC recommendations.    History of Present Illness: Sylvia Douglass is a 43 y.o. female who I spoke with on video visit today for follow-up psychotherapy and support. Ms. Douglass's medical history includes an asymptomatic vertex meningioma, migraine  headaches, and chronic back and neck pain. She also has a psychiatric history of trauma, anxiety, depression, ADHD, and stress within the home due to relationship problems. Past providers have included Sulma Carpenter LCSW, at a  clinic for several years. More recently, she was treated weekly by BAILEY Santos for relationship counseling and is transitioning to a new provider.       Subjective   Review of Systems:   Review of Systems    Patient History:   The following portions of the patient's history were reviewed and updated as appropriate: allergies, current medications, past family history, past medical history, past social history, past surgical history and problem list.     Social:  Sylvia presents in a significant amount of pain. She states that she had taken medication throughout the morning without relief. She reports that her pain is in her neck, arm, and shoulder.     Medications:     Current Outpatient Medications:     Alpha-Lipoic Acid (LIPOIC ACID PO), Take 300 mg by mouth., Disp: , Rfl:     ALPRAZolam (XANAX) 0.5 MG tablet, Take 1 tablet by mouth., Disp: , Rfl:     bimatoprost (LATISSE) 0.03 % ophthalmic solution, Latisse 0.03 % eyelash drops  APPLY 1 DROP TO APPLICATOR AND APPLY TO UPPER EYELID, ALONG EYELASHES, BY TOPICAL ROUTE ONCE DAILY AT NIGHTTIME, Disp: , Rfl:     Biotin 23852 MCG tablet dispersible, , Disp: , Rfl:     BIOTIN PO, biotin, Disp: , Rfl:     butalbital-acetaminophen-caffeine (FIORICET, ESGIC) -40 MG per tablet, , Disp: , Rfl:     Calcium Citrate-Vitamin D3 (CITRACAL) 315-6.25 MG-MCG tablet tablet, Take  by mouth Daily., Disp: , Rfl:     clindamycin (CLINDAGEL) 1 % gel, clindamycin 1 % topical gel  APPLY A THIN LAYER TO AREA OF BREAKOUTS ON FACE TWO TIMES A DAY, Disp: , Rfl:     Cyanocobalamin (Vitamin B 12) 100 MCG lozenge, Take 5,000 mcg by mouth., Disp: , Rfl:     cyclobenzaprine (FLEXERIL) 10 MG tablet, Take 1 tablet by mouth 3 (Three) Times a Day., Disp: 15  tablet, Rfl: 0    DHEA 25 MG capsule, Take  by mouth., Disp: , Rfl:     Dymista 137-50 MCG/ACT suspension, , Disp: , Rfl:     EPINEPHrine (EPIPEN) 0.3 MG/0.3ML solution auto-injector injection, , Disp: , Rfl:     famotidine (PEPCID) 20 MG tablet, , Disp: , Rfl:     guaiFENesin (HUMIBID 3) 400 MG tablet, Take 1 tablet by mouth 6 (Six) Times a Day., Disp: , Rfl:     HYDROcodone-acetaminophen (NORCO) 7.5-325 MG per tablet, , Disp: , Rfl:     Hydrocortisone 2.5 % kit, APPLY A THIN LAYER TO AFFECTED AREA(S) IF UNDER ARMS ARE IRRITATED TWO TIMES A DAY FOR UP TO TWO WEEKS AS NEEDED FLARES, Disp: , Rfl:     l-methylfolate calcium (DEPLIN) 7.5 MG tablet tablet, Take 1 tablet by mouth Daily., Disp: , Rfl:     Loratadine 10 MG capsule, loratadine, Disp: , Rfl:     MAGNESIUM GLYCINATE PO, Take 200 mg by mouth., Disp: , Rfl:     Melatonin 1 MG capsule, Take 4 mg by mouth., Disp: , Rfl:     multivitamin with minerals tablet tablet, Take 1 tablet by mouth Daily., Disp: , Rfl:     naloxone (NARCAN) 4 MG/0.1ML nasal spray, SMARTSIG:Both Nares, Disp: , Rfl:     NON FORMULARY, 1,000 mg. M-acetylcysteine, Disp: , Rfl:     Nutritional Supplements (VITAMIN D BOOSTER PO), Vitamin D2, Disp: , Rfl:     oxyCODONE (Roxicodone) 5 MG immediate release tablet, Take 1 tablet by mouth Every 4 (Four) Hours As Needed for Moderate Pain., Disp: 10 tablet, Rfl: 0    promethazine (PHENERGAN) 25 MG tablet, Take 1 tablet by mouth Every 6 (Six) Hours As Needed for Nausea or Vomiting., Disp: 10 tablet, Rfl: 0    pseudoephedrine (SUDAFED) 120 MG 12 hr tablet, Every 12 (Twelve) Hours., Disp: , Rfl:     rizatriptan (MAXALT) 10 MG tablet, , Disp: , Rfl:     sucralfate (CARAFATE) 1 g tablet, Take 1 tablet by mouth 4 (Four) Times a Day., Disp: , Rfl:     UBIQUINOL PO, Take 200 mg by mouth., Disp: , Rfl:     Zinc 23 MG lozenge, Dissolve 22 mg in the mouth., Disp: , Rfl:     Objective     Mental Status Exam:   Appearance: Normal   Hygiene: Normal  Cooperation:  Cooperative  Eye Contact: Fleeting   Psychomotor Behavior: Unremarkable  Mood: Dysphoric  Affect: Appropriate  Speech: Clear  Thought Process: Circumstantial   Thought Content: Congruent to mood  Suicidal: No SI indicated  Homicidal: No HI indicated  Hallucinations: None reported   Delusion: None reported  Memory: Not assessed  Orientation: Oriented x3  Reliability: Good  Insight: Fair  Judgement: Good  Impulse Control: Influenced by mood    Assessment / Plan      Visit Diagnosis/Orders Placed This Visit:  Diagnoses and all orders for this visit:    1. Post-traumatic stress disorder, chronic (Primary)    2. Chronic neck and back pain    3. History of migraine with aura    4. Attention deficit hyperactivity disorder (ADHD), other type    5. Problems in relationship with spouse or partner    6. Work-related stress       Differential:   Generalized Anxiety Disorder    Validated patient's frustration related to increased pain intensity and frequency. Collaboratively identified potential stressors that were likely influential, including the conflict with her boyfriend and increased activity at work.     Reiterated the importance of activity pacing to not overexert herself. Identified a more appropriate routine to prioritize sleep important for healing and migraine prevention.     Cognitive challenging was also used to discredit negative thinking related to things beyond her control. Analyzed how she can't control the actions of others, but she can control how she responds to them. Reaffirmed her ability to express emotions assertively, rather than passively or aggressively.    Treatment Plan/Goals:   Stabilize mood. Ameliorate or decrease the frequency and severity of depression, anxiety, and panic by attending routine cognitive behavior therapy and/or medication management of her symptoms.  Verbalize healthy, realistic cognitive messages that promote harmony with others, self-acceptance, and confidence. Continue CBT to  decrease the frequency of trauma-related flash backs and self-defeating thoughts that exacerbate her pain problems. Improve conflict-resolution and distress tolerance skills.   Maintain scheduled appointments with treatment providers, including therapists, primary care providers, specialists, etc.  Implement relaxation strategies in her daily routine to combat stress and make chronic pain more manageable.  Improve support system. Actively participate in individual and/or group psychotherapy routinely.      The patient seems reasonably able to adhere to the treatment plan.      SAFETY CONSIDERATIONS:    The patient adamantly and convincingly denies current suicidal or homicidal ideation or perceptual disturbance. Risk factors which would indicate the need for a higher level of care, including thoughts to harm self or others and/or self-harming behavior. The patient is to contact this office, call 911 or [phone] for the Suicide and Crisis Lifeline, or present to the nearest ER.    Quality Measures:   Never smoker    Follow Up:   Return in about 1 week (around 9/20/2023) for Video visit.      Rubi Garduno, PhD, Temp Licensed Psychologist

## 2023-10-03 NOTE — TELEPHONE ENCOUNTER
[facility] staff attempted to follow warm transfer process and was unsuccessful     Caller: Sylvia Douglass    Relationship to patient: Self    Best call back number: [phone]    Patient is needing: GUIDANCE ON NEXT STEPS. SHE PREVIOUSLY SPOKE WITH KARENA (TELEPHONE ENCOUNTER 9/27/23) AND WAS ADVISED TO SPEAK WITH PCP ABOUT HAVING MRI ORDERED. PCP IS OUT OF TOWN AND UNABLE TO ORDER MRI AT THIS TIME. PATIENT CAN BE SCHEDULED WITH [facility], BUT [facility] NEXT AVAILABLE IS IN SEVERAL MONTHS.    PATIENT STATES SINCE SPEAKING WITH MA, HER SYMPTOMS HAVE WORSENED. SHE WAS EXPERIENCING ARM NUMBNESS ON AND OFF BEFORE, BUT NOW HAS CONSTANT NUMBNESS ALL THE WAY DOWN ARM TO HER FINGERTIPS AND IS DROPPING THINGS. THIS HAS WORSENED OVER THE LAST 2-3 DAYS.    ATTEMPTED TO WARM TRANSFER DUE TO NATURE OF PROGRESSING SYMPTOMS. PLEASE CALL PATIENT TO ADVISE.

## 2023-10-05 ENCOUNTER — OFFICE VISIT (OUTPATIENT)
Dept: NEUROSURGERY | Facility: CLINIC | Age: 43
End: 2023-10-05
Payer: COMMERCIAL

## 2023-10-05 VITALS
WEIGHT: 153 LBS | TEMPERATURE: 98 F | BODY MASS INDEX: 23.19 KG/M2 | DIASTOLIC BLOOD PRESSURE: 80 MMHG | HEIGHT: 68 IN | SYSTOLIC BLOOD PRESSURE: 120 MMHG

## 2023-10-05 DIAGNOSIS — M54.50 CHRONIC BILATERAL LOW BACK PAIN, UNSPECIFIED WHETHER SCIATICA PRESENT: ICD-10-CM

## 2023-10-05 DIAGNOSIS — D32.0 BENIGN MENINGIOMA OF BRAIN: ICD-10-CM

## 2023-10-05 DIAGNOSIS — M54.12 CERVICAL RADICULOPATHY: ICD-10-CM

## 2023-10-05 DIAGNOSIS — M54.2 CHRONIC NECK PAIN: Primary | ICD-10-CM

## 2023-10-05 DIAGNOSIS — R20.2 NUMBNESS AND TINGLING OF RIGHT ARM: ICD-10-CM

## 2023-10-05 DIAGNOSIS — G89.29 CHRONIC NECK PAIN: Primary | ICD-10-CM

## 2023-10-05 DIAGNOSIS — R20.0 NUMBNESS AND TINGLING OF RIGHT ARM: ICD-10-CM

## 2023-10-05 DIAGNOSIS — G89.29 CHRONIC BILATERAL LOW BACK PAIN, UNSPECIFIED WHETHER SCIATICA PRESENT: ICD-10-CM

## 2023-10-05 DIAGNOSIS — M50.30 DDD (DEGENERATIVE DISC DISEASE), CERVICAL: ICD-10-CM
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
## 2023-10-13 ENCOUNTER — OFFICE VISIT (OUTPATIENT)
Dept: NEUROSURGERY | Facility: CLINIC | Age: 43
End: 2023-10-13
Payer: COMMERCIAL

## 2023-10-13 ENCOUNTER — TELEMEDICINE (OUTPATIENT)
Dept: PSYCHIATRY | Facility: CLINIC | Age: 43
End: 2023-10-13
Payer: COMMERCIAL

## 2023-10-13 VITALS
BODY MASS INDEX: 22.13 KG/M2 | HEIGHT: 68 IN | TEMPERATURE: 97.7 F | WEIGHT: 146 LBS | DIASTOLIC BLOOD PRESSURE: 90 MMHG | SYSTOLIC BLOOD PRESSURE: 138 MMHG

## 2023-10-13 DIAGNOSIS — R20.2 NUMBNESS AND TINGLING OF RIGHT ARM: Primary | ICD-10-CM

## 2023-10-13 DIAGNOSIS — M50.30 DDD (DEGENERATIVE DISC DISEASE), CERVICAL: ICD-10-CM

## 2023-10-13 DIAGNOSIS — G89.29 CHRONIC BILATERAL LOW BACK PAIN, UNSPECIFIED WHETHER SCIATICA PRESENT: ICD-10-CM

## 2023-10-13 DIAGNOSIS — M54.9 CHRONIC NECK AND BACK PAIN: ICD-10-CM

## 2023-10-13 DIAGNOSIS — F43.12 POST-TRAUMATIC STRESS DISORDER, CHRONIC: Primary | ICD-10-CM

## 2023-10-13 DIAGNOSIS — M54.2 CHRONIC NECK AND BACK PAIN: ICD-10-CM

## 2023-10-13 DIAGNOSIS — R20.0 NUMBNESS AND TINGLING OF RIGHT ARM: Primary | ICD-10-CM

## 2023-10-13 DIAGNOSIS — G89.29 CHRONIC NECK AND BACK PAIN: ICD-10-CM

## 2023-10-13 DIAGNOSIS — G89.29 CHRONIC NECK PAIN: ICD-10-CM

## 2023-10-13 DIAGNOSIS — F90.8 ATTENTION DEFICIT HYPERACTIVITY DISORDER (ADHD), OTHER TYPE: ICD-10-CM

## 2023-10-13 DIAGNOSIS — D32.0 BENIGN MENINGIOMA OF BRAIN: ICD-10-CM

## 2023-10-13 DIAGNOSIS — M54.50 CHRONIC BILATERAL LOW BACK PAIN, UNSPECIFIED WHETHER SCIATICA PRESENT: ICD-10-CM

## 2023-10-13 DIAGNOSIS — G43.719 INTRACTABLE CHRONIC MIGRAINE WITHOUT AURA AND WITHOUT STATUS MIGRAINOSUS: ICD-10-CM

## 2023-10-13 DIAGNOSIS — M54.2 CHRONIC NECK PAIN: ICD-10-CM
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
## 2024-01-31 ENCOUNTER — OFFICE VISIT (OUTPATIENT)
Dept: NEUROSURGERY | Facility: CLINIC | Age: 44
End: 2024-01-31
Payer: COMMERCIAL

## 2024-01-31 VITALS
WEIGHT: 157.7 LBS | SYSTOLIC BLOOD PRESSURE: 104 MMHG | BODY MASS INDEX: 23.9 KG/M2 | HEIGHT: 68 IN | DIASTOLIC BLOOD PRESSURE: 68 MMHG

## 2024-01-31 DIAGNOSIS — G56.03 BILATERAL CARPAL TUNNEL SYNDROME: ICD-10-CM

## 2024-01-31 DIAGNOSIS — M54.31 SCIATICA OF RIGHT SIDE: ICD-10-CM

## 2024-01-31 DIAGNOSIS — G89.29 CHRONIC NECK PAIN: ICD-10-CM

## 2024-01-31 DIAGNOSIS — R20.0 NUMBNESS AND TINGLING OF RIGHT ARM: Primary | ICD-10-CM

## 2024-01-31 DIAGNOSIS — M54.2 CHRONIC NECK PAIN: ICD-10-CM

## 2024-01-31 DIAGNOSIS — R20.2 NUMBNESS AND TINGLING OF RIGHT ARM: Primary | ICD-10-CM
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
## 2024-02-01 ENCOUNTER — TELEPHONE (OUTPATIENT)
Dept: NEUROSURGERY | Facility: OTHER | Age: 44
End: 2024-02-01
Payer: COMMERCIAL

## 2024-02-01 ENCOUNTER — HOSPITAL ENCOUNTER (OUTPATIENT)
Dept: NEUROLOGY | Facility: HOSPITAL | Age: 44
Discharge: HOME | End: 2024-02-01
Admitting: PHYSICIAN ASSISTANT
Payer: COMMERCIAL

## 2024-02-01 DIAGNOSIS — R20.0 NUMBNESS AND TINGLING OF RIGHT ARM: ICD-10-CM

## 2024-02-01 DIAGNOSIS — R20.2 NUMBNESS AND TINGLING OF RIGHT ARM: ICD-10-CM

## 2024-02-01 PROCEDURE — 95886 MUSC TEST DONE W/N TEST COMP: CPT

## 2024-02-01 PROCEDURE — 95909 NRV CNDJ TST 5-6 STUDIES: CPT

## 2024-02-01 PROCEDURE — 95908 NRV CNDJ TST 3-4 STUDIES: CPT

## 2024-02-01 NOTE — TELEPHONE ENCOUNTER
Caller: Sylvia Douglass    Relationship: Self    Best call back number: [phone] (home)     What was the call regarding: PATIENT CALLED IN INQUIRING WHAT SHE IS SUPPOSED TO DO ABOUT WORK- SHE IS IN ALOT OF PAIN WITH HER SCIATICA- IT IS AFFECTING HER ABILITY TO WALK - SHE WALKS ALOT DURING HER SHIFT, CLIMB UP AND DOWN LADDERS AND SHE LIFTS ALOT AS WELL- DOES SHE NEED TO BE OFF WORK EXTENDED OR JUST FOR A DAY? IF SHE IS TAKEN OFF WORK, CAN A NOTE BE SENT TO HER MYCHART TO GIVE TO HER EMPLOYER?    SHE IS ALSO INQUIRING WHAT SHE NEEDS TO DO ABOUT THIS PAIN WITH HER SCIATICA- SHE IS FOLLOWING UP WITH PAIN MANAGEMENT ON 02/15/24 - SHE IS HAVING A PROCEDURE DONE CALLED A RADIO FREQUENCY ABLATION ON CERVICAL SPINE THEN F/U ON 02/19/24- WHERE SHE IS GOING TO SPEAK WITH THEM ABOUT THE SCIATICA AND TRYING TO GET SOME INJECTIONS.    SHE STATED HER PAIN MEDICATION IS NOT WORKING EITHER- SHE IS INQUIRING ON WHAT SHE CAN DO FOR HER PAIN UNTIL SHE GOES FOR THAT FOLLOW UP APPT-    SHE IS UNABLE TO TAKE ANY NSAID'S-  SHE HAS TRIED HEAT, ICE, PRESSURE AND PHYSICAL THERAPY STRETCHES -NOTHING IS WORKING.     PLEASE ADVISE- THANK YOU

## 2024-02-21 ENCOUNTER — LAB (OUTPATIENT)
Dept: LAB | Facility: HOSPITAL | Age: 44
End: 2024-02-21
Payer: COMMERCIAL

## 2024-02-21 ENCOUNTER — OFFICE VISIT (OUTPATIENT)
Dept: NEUROSURGERY | Facility: CLINIC | Age: 44
End: 2024-02-21
Payer: COMMERCIAL

## 2024-02-21 ENCOUNTER — TRANSCRIBE ORDERS (OUTPATIENT)
Dept: LAB | Facility: HOSPITAL | Age: 44
End: 2024-02-21
Payer: COMMERCIAL

## 2024-02-21 VITALS
TEMPERATURE: 97.5 F | BODY MASS INDEX: 24.4 KG/M2 | HEIGHT: 68 IN | SYSTOLIC BLOOD PRESSURE: 112 MMHG | WEIGHT: 161 LBS | DIASTOLIC BLOOD PRESSURE: 70 MMHG

## 2024-02-21 DIAGNOSIS — R10.2 PELVIC PAIN SYNDROME: Primary | ICD-10-CM

## 2024-02-21 DIAGNOSIS — M54.2 CHRONIC NECK PAIN: Primary | ICD-10-CM

## 2024-02-21 DIAGNOSIS — G56.01 CARPAL TUNNEL SYNDROME OF RIGHT WRIST: ICD-10-CM

## 2024-02-21 DIAGNOSIS — G89.29 CHRONIC NECK PAIN: Primary | ICD-10-CM

## 2024-02-21 DIAGNOSIS — R10.2 PELVIC PAIN SYNDROME: ICD-10-CM

## 2024-02-21 PROCEDURE — 82627 DEHYDROEPIANDROSTERONE: CPT

## 2024-02-21 PROCEDURE — 84402 ASSAY OF FREE TESTOSTERONE: CPT

## 2024-02-21 PROCEDURE — 36415 COLL VENOUS BLD VENIPUNCTURE: CPT

## 2024-02-21 PROCEDURE — 99214 OFFICE O/P EST MOD 30 MIN: CPT | Performed by: PHYSICIAN ASSISTANT

## 2024-02-21 PROCEDURE — 84403 ASSAY OF TOTAL TESTOSTERONE: CPT
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
## 2024-02-22 LAB — DHEA-S SERPL-MCNC: 274 UG/DL (ref 57.3–279.2)

## 2024-02-26 PROBLEM — G56.01 CARPAL TUNNEL SYNDROME OF RIGHT WRIST: Status: ACTIVE | Noted: 2022-06-29

## 2024-02-27 LAB
TESTOST FREE SERPL-MCNC: 0.6 PG/ML (ref 0–4.2)
TESTOST SERPL-MCNC: 19 NG/DL (ref 4–50)

## 2024-02-29 ENCOUNTER — LAB (OUTPATIENT)
Dept: LAB | Facility: HOSPITAL | Age: 44
End: 2024-02-29
Payer: COMMERCIAL

## 2024-02-29 ENCOUNTER — TRANSCRIBE ORDERS (OUTPATIENT)
Dept: LAB | Facility: HOSPITAL | Age: 44
End: 2024-02-29
Payer: COMMERCIAL

## 2024-02-29 DIAGNOSIS — Z32.01 PREGNANCY EXAMINATION OR TEST, POSITIVE RESULT: Primary | ICD-10-CM

## 2024-02-29 DIAGNOSIS — Z32.01 PREGNANCY EXAMINATION OR TEST, POSITIVE RESULT: ICD-10-CM

## 2024-02-29 LAB
HCG INTACT+B SERPL-ACNC: 2963 MIU/ML
PROGEST SERPL-MCNC: 13.7 NG/ML

## 2024-02-29 PROCEDURE — 84144 ASSAY OF PROGESTERONE: CPT

## 2024-02-29 PROCEDURE — 36415 COLL VENOUS BLD VENIPUNCTURE: CPT

## 2024-02-29 PROCEDURE — 84702 CHORIONIC GONADOTROPIN TEST: CPT

## 2024-03-02 ENCOUNTER — LAB (OUTPATIENT)
Dept: LAB | Facility: HOSPITAL | Age: 44
End: 2024-03-02
Payer: COMMERCIAL

## 2024-03-02 ENCOUNTER — TRANSCRIBE ORDERS (OUTPATIENT)
Dept: LAB | Facility: HOSPITAL | Age: 44
End: 2024-03-02
Payer: COMMERCIAL

## 2024-03-02 DIAGNOSIS — Z32.01 PREGNANCY EXAMINATION OR TEST, POSITIVE RESULT: ICD-10-CM

## 2024-03-02 DIAGNOSIS — Z32.01 PREGNANCY EXAMINATION OR TEST, POSITIVE RESULT: Primary | ICD-10-CM

## 2024-03-02 LAB — HCG INTACT+B SERPL-ACNC: 5359 MIU/ML

## 2024-03-02 PROCEDURE — 36415 COLL VENOUS BLD VENIPUNCTURE: CPT

## 2024-03-02 PROCEDURE — 84702 CHORIONIC GONADOTROPIN TEST: CPT

## 2024-03-06 ENCOUNTER — TRANSCRIBE ORDERS (OUTPATIENT)
Dept: OBSTETRICS AND GYNECOLOGY | Facility: HOSPITAL | Age: 44
End: 2024-03-06
Payer: COMMERCIAL

## 2024-03-06 DIAGNOSIS — N80.9 ENDOMETRIOSIS: ICD-10-CM

## 2024-03-06 DIAGNOSIS — O09.511 PRIMIGRAVIDA OF ADVANCED MATERNAL AGE IN FIRST TRIMESTER: ICD-10-CM

## 2024-03-06 DIAGNOSIS — Z34.90 PREGNANCY, UNSPECIFIED GESTATIONAL AGE: ICD-10-CM

## 2024-03-06 DIAGNOSIS — O09.891 MEDICATION EXPOSURE DURING FIRST TRIMESTER OF PREGNANCY: Primary | ICD-10-CM

## 2024-03-13 ENCOUNTER — OFFICE VISIT (OUTPATIENT)
Dept: OBSTETRICS AND GYNECOLOGY | Facility: HOSPITAL | Age: 44
End: 2024-03-13
Payer: COMMERCIAL

## 2024-03-13 ENCOUNTER — HOSPITAL ENCOUNTER (OUTPATIENT)
Dept: WOMENS IMAGING | Facility: HOSPITAL | Age: 44
Discharge: HOME | End: 2024-03-13
Admitting: OBSTETRICS & GYNECOLOGY
Payer: COMMERCIAL

## 2024-03-13 VITALS — DIASTOLIC BLOOD PRESSURE: 84 MMHG | WEIGHT: 157.8 LBS | SYSTOLIC BLOOD PRESSURE: 119 MMHG | BODY MASS INDEX: 23.99 KG/M2

## 2024-03-13 DIAGNOSIS — O09.891 MEDICATION EXPOSURE DURING FIRST TRIMESTER OF PREGNANCY: ICD-10-CM

## 2024-03-13 DIAGNOSIS — M54.2 CHRONIC NECK PAIN: Primary | ICD-10-CM

## 2024-03-13 DIAGNOSIS — Z34.90 PREGNANCY, UNSPECIFIED GESTATIONAL AGE: ICD-10-CM

## 2024-03-13 DIAGNOSIS — O09.511 PRIMIGRAVIDA OF ADVANCED MATERNAL AGE IN FIRST TRIMESTER: ICD-10-CM

## 2024-03-13 DIAGNOSIS — G89.29 CHRONIC NECK PAIN: Primary | ICD-10-CM

## 2024-03-13 DIAGNOSIS — O35.9XX0 TERATOGEN EXPOSURE IN CURRENT PREGNANCY, SINGLE OR UNSPECIFIED FETUS: ICD-10-CM

## 2024-03-13 DIAGNOSIS — N80.9 ENDOMETRIOSIS: ICD-10-CM

## 2024-03-13 PROCEDURE — 76815 OB US LIMITED FETUS(S): CPT

## 2024-03-13 PROCEDURE — 76817 TRANSVAGINAL US OBSTETRIC: CPT

## 2024-03-13 NOTE — PROGRESS NOTES
Pt has extensive medical Hx and extensive medication Hx see epic medical hx and med history.  Pt provided list of meds.   Next f/u with [facility] on 3/19/24
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
## 2024-03-19 ENCOUNTER — TRANSCRIBE ORDERS (OUTPATIENT)
Dept: LAB | Facility: HOSPITAL | Age: 44
End: 2024-03-19
Payer: COMMERCIAL

## 2024-03-19 ENCOUNTER — LAB (OUTPATIENT)
Dept: LAB | Facility: HOSPITAL | Age: 44
End: 2024-03-19
Payer: COMMERCIAL

## 2024-03-19 DIAGNOSIS — Z34.81 PRENATAL CARE, SUBSEQUENT PREGNANCY, FIRST TRIMESTER: Primary | ICD-10-CM

## 2024-03-19 DIAGNOSIS — Z34.81 PRENATAL CARE, SUBSEQUENT PREGNANCY, FIRST TRIMESTER: ICD-10-CM

## 2024-03-19 LAB
25(OH)D3 SERPL-MCNC: 75.7 NG/ML (ref 30–100)
ABO GROUP BLD: NORMAL
ALBUMIN SERPL-MCNC: 4.3 G/DL (ref 3.5–5.2)
ALBUMIN/GLOB SERPL: 1.9 G/DL
ALP SERPL-CCNC: 88 U/L (ref 39–117)
ALT SERPL W P-5'-P-CCNC: 18 U/L (ref 1–33)
AMPHET+METHAMPHET UR QL: NEGATIVE
AMPHETAMINES UR QL: NEGATIVE
ANION GAP SERPL CALCULATED.3IONS-SCNC: 9.4 MMOL/L (ref 5–15)
APTT PPP: 29 SECONDS (ref 22–39)
AST SERPL-CCNC: 17 U/L (ref 1–32)
BARBITURATES UR QL SCN: NEGATIVE
BENZODIAZ UR QL SCN: NEGATIVE
BILIRUB SERPL-MCNC: 0.2 MG/DL (ref 0–1.2)
BILIRUB UR QL STRIP: NEGATIVE
BLD GP AB SCN SERPL QL: NEGATIVE
BUN SERPL-MCNC: 7 MG/DL (ref 6–20)
BUN/CREAT SERPL: 11.7 (ref 7–25)
BUPRENORPHINE SERPL-MCNC: NEGATIVE NG/ML
CALCIUM SPEC-SCNC: 9.3 MG/DL (ref 8.6–10.5)
CALCIUM SPEC-SCNC: 9.3 MG/DL (ref 8.6–10.5)
CANNABINOIDS SERPL QL: POSITIVE
CHLORIDE SERPL-SCNC: 102 MMOL/L (ref 98–107)
CLARITY UR: CLEAR
CO2 SERPL-SCNC: 26.6 MMOL/L (ref 22–29)
COCAINE UR QL: NEGATIVE
COLOR UR: YELLOW
CREAT SERPL-MCNC: 0.6 MG/DL (ref 0.57–1)
DEPRECATED RDW RBC AUTO: 39.7 FL (ref 37–54)
EGFRCR SERPLBLD CKD-EPI 2021: 114.4 ML/MIN/1.73
ERYTHROCYTE [DISTWIDTH] IN BLOOD BY AUTOMATED COUNT: 11.8 % (ref 12.3–15.4)
FENTANYL UR-MCNC: NEGATIVE NG/ML
FERRITIN SERPL-MCNC: 77.2 NG/ML (ref 13–150)
GLOBULIN UR ELPH-MCNC: 2.3 GM/DL
GLUCOSE SERPL-MCNC: 73 MG/DL (ref 65–99)
GLUCOSE UR STRIP-MCNC: ABNORMAL MG/DL
HBV SURFACE AG SERPL QL IA: NORMAL
HCT VFR BLD AUTO: 37.5 % (ref 34–46.6)
HCV AB SER DONR QL: NORMAL
HGB BLD-MCNC: 12.7 G/DL (ref 12–15.9)
HGB UR QL STRIP.AUTO: NEGATIVE
HIV 1+2 AB+HIV1 P24 AG SERPL QL IA: NORMAL
HOLD SPECIMEN: NORMAL
INR PPP: 1.01 (ref 0.89–1.12)
IRON 24H UR-MRATE: 37 MCG/DL (ref 37–145)
IRON SATN MFR SERPL: 10 % (ref 20–50)
KETONES UR QL STRIP: NEGATIVE
LEUKOCYTE ESTERASE UR QL STRIP.AUTO: NEGATIVE
MAGNESIUM SERPL-MCNC: 2 MG/DL (ref 1.6–2.6)
MCH RBC QN AUTO: 31.4 PG (ref 26.6–33)
MCHC RBC AUTO-ENTMCNC: 33.9 G/DL (ref 31.5–35.7)
MCV RBC AUTO: 92.8 FL (ref 79–97)
METHADONE UR QL SCN: NEGATIVE
NITRITE UR QL STRIP: NEGATIVE
OPIATES UR QL: POSITIVE
OXYCODONE UR QL SCN: NEGATIVE
PCP UR QL SCN: NEGATIVE
PH UR STRIP.AUTO: 6.5 [PH] (ref 5–8)
PLATELET # BLD AUTO: 263 10*3/MM3 (ref 140–450)
PMV BLD AUTO: 11.3 FL (ref 6–12)
POTASSIUM SERPL-SCNC: 3.6 MMOL/L (ref 3.5–5.2)
PROT SERPL-MCNC: 6.6 G/DL (ref 6–8.5)
PROT UR QL STRIP: NEGATIVE
PROTHROMBIN TIME: 13.4 SECONDS (ref 12.2–14.5)
PTH-INTACT SERPL-MCNC: 22.4 PG/ML (ref 15–65)
RBC # BLD AUTO: 4.04 10*6/MM3 (ref 3.77–5.28)
RH BLD: POSITIVE
SODIUM SERPL-SCNC: 138 MMOL/L (ref 136–145)
SP GR UR STRIP: 1.01 (ref 1–1.03)
TIBC SERPL-MCNC: 374 MCG/DL (ref 298–536)
TRANSFERRIN SERPL-MCNC: 251 MG/DL (ref 200–360)
TRICYCLICS UR QL SCN: NEGATIVE
UROBILINOGEN UR QL STRIP: ABNORMAL
VIT B12 BLD-MCNC: 881 PG/ML (ref 211–946)
WBC NRBC COR # BLD AUTO: 5.72 10*3/MM3 (ref 3.4–10.8)

## 2024-03-19 PROCEDURE — 85610 PROTHROMBIN TIME: CPT

## 2024-03-19 PROCEDURE — 82728 ASSAY OF FERRITIN: CPT

## 2024-03-19 PROCEDURE — 86762 RUBELLA ANTIBODY: CPT

## 2024-03-19 PROCEDURE — 86780 TREPONEMA PALLIDUM: CPT

## 2024-03-19 PROCEDURE — 81003 URINALYSIS AUTO W/O SCOPE: CPT

## 2024-03-19 PROCEDURE — 87491 CHLMYD TRACH DNA AMP PROBE: CPT

## 2024-03-19 PROCEDURE — 82607 VITAMIN B-12: CPT

## 2024-03-19 PROCEDURE — 86900 BLOOD TYPING SEROLOGIC ABO: CPT

## 2024-03-19 PROCEDURE — 84466 ASSAY OF TRANSFERRIN: CPT

## 2024-03-19 PROCEDURE — 86850 RBC ANTIBODY SCREEN: CPT

## 2024-03-19 PROCEDURE — 83735 ASSAY OF MAGNESIUM: CPT

## 2024-03-19 PROCEDURE — 83970 ASSAY OF PARATHORMONE: CPT

## 2024-03-19 PROCEDURE — 82306 VITAMIN D 25 HYDROXY: CPT

## 2024-03-19 PROCEDURE — G0432 EIA HIV-1/HIV-2 SCREEN: HCPCS

## 2024-03-19 PROCEDURE — 87563 M. GENITALIUM AMP PROBE: CPT

## 2024-03-19 PROCEDURE — 85027 COMPLETE CBC AUTOMATED: CPT

## 2024-03-19 PROCEDURE — 87086 URINE CULTURE/COLONY COUNT: CPT

## 2024-03-19 PROCEDURE — 87591 N.GONORRHOEAE DNA AMP PROB: CPT

## 2024-03-19 PROCEDURE — 36415 COLL VENOUS BLD VENIPUNCTURE: CPT

## 2024-03-19 PROCEDURE — 80053 COMPREHEN METABOLIC PANEL: CPT

## 2024-03-19 PROCEDURE — 86901 BLOOD TYPING SEROLOGIC RH(D): CPT

## 2024-03-19 PROCEDURE — 84597 ASSAY OF VITAMIN K: CPT

## 2024-03-19 PROCEDURE — 84590 ASSAY OF VITAMIN A: CPT

## 2024-03-19 PROCEDURE — 86803 HEPATITIS C AB TEST: CPT

## 2024-03-19 PROCEDURE — 83540 ASSAY OF IRON: CPT

## 2024-03-19 PROCEDURE — 87340 HEPATITIS B SURFACE AG IA: CPT

## 2024-03-19 PROCEDURE — 85730 THROMBOPLASTIN TIME PARTIAL: CPT

## 2024-03-19 PROCEDURE — 80307 DRUG TEST PRSMV CHEM ANLYZR: CPT

## 2024-03-21 ENCOUNTER — TRANSCRIBE ORDERS (OUTPATIENT)
Dept: LAB | Facility: HOSPITAL | Age: 44
End: 2024-03-21
Payer: COMMERCIAL

## 2024-03-21 ENCOUNTER — LAB (OUTPATIENT)
Dept: LAB | Facility: HOSPITAL | Age: 44
End: 2024-03-21
Payer: COMMERCIAL

## 2024-03-21 DIAGNOSIS — Z32.01 POSITIVE PREGNANCY TEST: Primary | ICD-10-CM

## 2024-03-21 DIAGNOSIS — Z32.01 POSITIVE PREGNANCY TEST: ICD-10-CM

## 2024-03-21 LAB
BACTERIA SPEC AEROBE CULT: NO GROWTH
HBA1C MFR BLD: 5.7 % (ref 4.8–5.6)
RUBV IGG SERPL IA-ACNC: 2.13 INDEX
TREPONEMA PALLIDUM IGG+IGM AB [PRESENCE] IN SERUM OR PLASMA BY IMMUNOASSAY: NON REACTIVE

## 2024-03-21 PROCEDURE — 36415 COLL VENOUS BLD VENIPUNCTURE: CPT

## 2024-03-21 PROCEDURE — 83036 HEMOGLOBIN GLYCOSYLATED A1C: CPT

## 2024-03-22 LAB — PHYTONADIONE SERPL-MCNC: 0.27 NG/ML (ref 0.1–2.2)

## 2024-03-23 LAB
C TRACH RRNA UR QL NAA+PROBE: NEGATIVE
M GENITALIUM DNA UR QL NAA+PROBE: NEGATIVE
N GONORRHOEA RRNA UR QL NAA+PROBE: NEGATIVE

## 2024-03-25 LAB — VIT A SERPL-MCNC: 34.2 UG/DL (ref 20.1–62)

## 2024-04-02 ENCOUNTER — TELEPHONE (OUTPATIENT)
Dept: ONCOLOGY | Facility: CLINIC | Age: 44
End: 2024-04-02

## 2024-04-02 NOTE — TELEPHONE ENCOUNTER
The formerly [facility] received a fax that requires your attention. The document has been indexed to the patient’s chart for your review.      Reason for sending: RECEIVED MEDICAL RECORDS FOR SCHEDULED REFERRAL    Documents Description: INSURANCE CARD 04/02/24, MEDICAL SUMMARY 04/02/24, LAB 02/19/24, EDG 01/02/24, PATHOLOGY 01/02/24.> INDEXED IN CHART    Name of Sender: [facility] [phone]    Date Indexed: 04/02/24    Notes (if needed): THANKS!

## 2024-04-04 ENCOUNTER — LAB REQUISITION (OUTPATIENT)
Dept: LAB | Facility: HOSPITAL | Age: 44
End: 2024-04-04
Payer: COMMERCIAL

## 2024-04-04 DIAGNOSIS — Z98.890 S/P DILATION AND CURETTAGE: Primary | ICD-10-CM

## 2024-04-04 DIAGNOSIS — O02.1 MISSED ABORTION: ICD-10-CM

## 2024-04-04 PROCEDURE — 88305 TISSUE EXAM BY PATHOLOGIST: CPT | Performed by: OBSTETRICS & GYNECOLOGY

## 2024-04-04 RX ORDER — HYDROCODONE BITARTRATE AND ACETAMINOPHEN 5; 325 MG/1; MG/1
1 TABLET ORAL EVERY 6 HOURS PRN
Qty: 6 TABLET | Refills: 0 | Status: SHIPPED | OUTPATIENT
Start: 2024-04-04

## 2024-04-05 LAB
CYTO UR: NORMAL
LAB AP CASE REPORT: NORMAL
LAB AP CLINICAL INFORMATION: NORMAL
PATH REPORT.FINAL DX SPEC: NORMAL
PATH REPORT.GROSS SPEC: NORMAL

## 2024-04-10 ENCOUNTER — CONSULT (OUTPATIENT)
Dept: ONCOLOGY | Facility: CLINIC | Age: 44
End: 2024-04-10
Payer: COMMERCIAL

## 2024-04-10 VITALS
TEMPERATURE: 98.3 F | HEART RATE: 113 BPM | SYSTOLIC BLOOD PRESSURE: 134 MMHG | WEIGHT: 165 LBS | DIASTOLIC BLOOD PRESSURE: 82 MMHG | BODY MASS INDEX: 25.01 KG/M2 | OXYGEN SATURATION: 97 % | HEIGHT: 68 IN

## 2024-04-10 DIAGNOSIS — K90.9 INTESTINAL MALABSORPTION, UNSPECIFIED TYPE: ICD-10-CM

## 2024-04-10 DIAGNOSIS — E83.119 HEMOCHROMATOSIS, UNSPECIFIED HEMOCHROMATOSIS TYPE: Primary | ICD-10-CM
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
## 2024-04-12 DIAGNOSIS — R20.2 NUMBNESS AND TINGLING OF RIGHT ARM: ICD-10-CM

## 2024-04-12 DIAGNOSIS — R20.0 NUMBNESS AND TINGLING OF RIGHT ARM: ICD-10-CM

## 2024-04-12 DIAGNOSIS — M54.2 CHRONIC NECK PAIN: Primary | ICD-10-CM

## 2024-04-12 DIAGNOSIS — G89.29 CHRONIC NECK PAIN: Primary | ICD-10-CM

## 2024-04-12 DIAGNOSIS — G56.01 CARPAL TUNNEL SYNDROME OF RIGHT WRIST: ICD-10-CM

## 2024-04-17 ENCOUNTER — TELEPHONE (OUTPATIENT)
Dept: ONCOLOGY | Facility: CLINIC | Age: 44
End: 2024-04-17

## 2024-04-17 NOTE — TELEPHONE ENCOUNTER
Caller: Sylvia Douglass    Relationship: Self    Best call back number: [phone]     What is the best time to reach you: ASAP    Who are you requesting to speak with (clinical staff, provider,  specific staff member): SCHEDULING        What was the call regarding: PT CAN'T COME ON 5/14, SHE WILL NEED THE FOLLOW UP RESCHEDULED, BUT SHE IS IN THE PROCESS OF POSSIBLY RESCHEDULING HER LIVER ULTRASOUND TO A DATE EARLIER THAN 5/23 ALSO.  SHE WANTED TO FIND OUT IF DR MORALES WANTS HER TO HAVE THE ULTRASOUND BEFORE THE FOLLOW UP.  PLEASE CALL PATIENT TO RESCHEDULE ACCORDINGLY.

## 2024-04-17 NOTE — TELEPHONE ENCOUNTER
Caller: Sylvia Douglass    Relationship to patient: Self    Best call back number: [phone]    Chief complaint: NEEDS TO BE AFTER ULTRASOUND    Type of visit: F/U 1     Requested date: CALL TO R/S     If rescheduling, when is the original appointment: 5/14/2024

## 2024-05-17 ENCOUNTER — HOSPITAL ENCOUNTER (OUTPATIENT)
Facility: HOSPITAL | Age: 44
Discharge: HOME | End: 2024-05-17
Admitting: INTERNAL MEDICINE
Payer: COMMERCIAL

## 2024-05-17 DIAGNOSIS — K90.9 INTESTINAL MALABSORPTION, UNSPECIFIED TYPE: ICD-10-CM

## 2024-05-17 DIAGNOSIS — E83.119 HEMOCHROMATOSIS, UNSPECIFIED HEMOCHROMATOSIS TYPE: ICD-10-CM

## 2024-05-17 PROCEDURE — 76705 ECHO EXAM OF ABDOMEN: CPT

## 2024-05-21 ENCOUNTER — TELEPHONE (OUTPATIENT)
Dept: ONCOLOGY | Facility: CLINIC | Age: 44
End: 2024-05-21
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
## 2024-05-24 ENCOUNTER — LAB (OUTPATIENT)
Dept: LAB | Facility: HOSPITAL | Age: 44
End: 2024-05-24
Payer: COMMERCIAL

## 2024-05-24 DIAGNOSIS — K90.9 INTESTINAL MALABSORPTION, UNSPECIFIED TYPE: ICD-10-CM

## 2024-05-24 DIAGNOSIS — E83.119 HEMOCHROMATOSIS, UNSPECIFIED HEMOCHROMATOSIS TYPE: ICD-10-CM

## 2024-05-24 LAB
ALBUMIN SERPL-MCNC: 4.4 G/DL (ref 3.5–5.2)
ALBUMIN/GLOB SERPL: 1.9 G/DL
ALP SERPL-CCNC: 73 U/L (ref 39–117)
ALT SERPL W P-5'-P-CCNC: 24 U/L (ref 1–33)
ANION GAP SERPL CALCULATED.3IONS-SCNC: 9 MMOL/L (ref 5–15)
AST SERPL-CCNC: 26 U/L (ref 1–32)
BASOPHILS # BLD AUTO: 0.02 10*3/MM3 (ref 0–0.2)
BASOPHILS NFR BLD AUTO: 0.3 % (ref 0–1.5)
BILIRUB SERPL-MCNC: 0.2 MG/DL (ref 0–1.2)
BUN SERPL-MCNC: 7 MG/DL (ref 6–20)
BUN/CREAT SERPL: 10.4 (ref 7–25)
CALCIUM SPEC-SCNC: 10 MG/DL (ref 8.6–10.5)
CHLORIDE SERPL-SCNC: 98 MMOL/L (ref 98–107)
CO2 SERPL-SCNC: 29 MMOL/L (ref 22–29)
CREAT SERPL-MCNC: 0.67 MG/DL (ref 0.57–1)
DEPRECATED RDW RBC AUTO: 46.7 FL (ref 37–54)
EGFRCR SERPLBLD CKD-EPI 2021: 111.4 ML/MIN/1.73
EOSINOPHIL # BLD AUTO: 0.14 10*3/MM3 (ref 0–0.4)
EOSINOPHIL NFR BLD AUTO: 2.1 % (ref 0.3–6.2)
ERYTHROCYTE [DISTWIDTH] IN BLOOD BY AUTOMATED COUNT: 13.2 % (ref 12.3–15.4)
FERRITIN SERPL-MCNC: 65.65 NG/ML (ref 13–150)
GLOBULIN UR ELPH-MCNC: 2.3 GM/DL
GLUCOSE SERPL-MCNC: 175 MG/DL (ref 65–99)
HCT VFR BLD AUTO: 42 % (ref 34–46.6)
HGB BLD-MCNC: 13.7 G/DL (ref 12–15.9)
IMM GRANULOCYTES # BLD AUTO: 0.02 10*3/MM3 (ref 0–0.05)
IMM GRANULOCYTES NFR BLD AUTO: 0.3 % (ref 0–0.5)
IRON 24H UR-MRATE: 79 MCG/DL (ref 37–145)
IRON SATN MFR SERPL: 19 % (ref 20–50)
LYMPHOCYTES # BLD AUTO: 2.2 10*3/MM3 (ref 0.7–3.1)
LYMPHOCYTES NFR BLD AUTO: 33.2 % (ref 19.6–45.3)
MCH RBC QN AUTO: 31.1 PG (ref 26.6–33)
MCHC RBC AUTO-ENTMCNC: 32.6 G/DL (ref 31.5–35.7)
MCV RBC AUTO: 95.2 FL (ref 79–97)
MONOCYTES # BLD AUTO: 0.34 10*3/MM3 (ref 0.1–0.9)
MONOCYTES NFR BLD AUTO: 5.1 % (ref 5–12)
NEUTROPHILS NFR BLD AUTO: 3.91 10*3/MM3 (ref 1.7–7)
NEUTROPHILS NFR BLD AUTO: 59 % (ref 42.7–76)
NRBC BLD AUTO-RTO: 0 /100 WBC (ref 0–0.2)
PLATELET # BLD AUTO: 285 10*3/MM3 (ref 140–450)
PMV BLD AUTO: 11.6 FL (ref 6–12)
POTASSIUM SERPL-SCNC: 4.1 MMOL/L (ref 3.5–5.2)
PROT SERPL-MCNC: 6.7 G/DL (ref 6–8.5)
RBC # BLD AUTO: 4.41 10*6/MM3 (ref 3.77–5.28)
SODIUM SERPL-SCNC: 136 MMOL/L (ref 136–145)
TIBC SERPL-MCNC: 405 MCG/DL (ref 298–536)
TRANSFERRIN SERPL-MCNC: 272 MG/DL (ref 200–360)
WBC NRBC COR # BLD AUTO: 6.63 10*3/MM3 (ref 3.4–10.8)

## 2024-05-24 PROCEDURE — 84466 ASSAY OF TRANSFERRIN: CPT

## 2024-05-24 PROCEDURE — 82746 ASSAY OF FOLIC ACID SERUM: CPT

## 2024-05-24 PROCEDURE — 82728 ASSAY OF FERRITIN: CPT

## 2024-05-24 PROCEDURE — 83540 ASSAY OF IRON: CPT

## 2024-05-24 PROCEDURE — 36415 COLL VENOUS BLD VENIPUNCTURE: CPT

## 2024-05-24 PROCEDURE — 80053 COMPREHEN METABOLIC PANEL: CPT

## 2024-05-24 PROCEDURE — 85025 COMPLETE CBC W/AUTO DIFF WBC: CPT

## 2024-05-25 LAB — FOLATE SERPL-MCNC: >20 NG/ML (ref 4.78–24.2)

## 2024-05-28 ENCOUNTER — TELEPHONE (OUTPATIENT)
Dept: ONCOLOGY | Facility: CLINIC | Age: 44
End: 2024-05-28
Payer: COMMERCIAL

## 2024-05-28 ENCOUNTER — TELEPHONE (OUTPATIENT)
Dept: NEUROSURGERY | Facility: CLINIC | Age: 44
End: 2024-05-28
Payer: COMMERCIAL

## 2024-05-28 DIAGNOSIS — D32.9 MENINGIOMA: Primary | ICD-10-CM

## 2024-05-28 NOTE — TELEPHONE ENCOUNTER
Provider:  Kyle  Surgery/Procedure:  MILES  Surgery/Procedure Date:    Last visit:   2/21/24  Next visit: MILES - also needs follow up MRI for monitoring meningioma     Reason for call:  Ms. Douglass called in regards to medication questions in relation to fertility and her history of benign meningioma. She states she is wanting to try to conceive for a child, and her physician has discussed omnitrope and Serovita. Ms. Douglass wanted to check in with our practice to be sure that those drugs are safe to take with her history of meningioma, wanted to know the risk vs benefit ratio to help her decision.     She is also due for a 2 year follow up with brain MRI to monitor the meningioma, would like that to also be scheduled.

## 2024-05-28 NOTE — TELEPHONE ENCOUNTER
Pt called to ask questions about supplements recommended by another provider.  Please call to discuss

## 2024-05-28 NOTE — TELEPHONE ENCOUNTER
I called patient and she answered the phone but when I began speaking, she said she had another call and did not come back to the line.  I  called her back and it went to voicemail and I left a message that she can bring the supplements that were recommended to the appointment with Dr. Fuentes tomorrow so she could discuss those.  I stated that patient has appt scheduled at 9:15am tomorrow.

## 2024-05-29 ENCOUNTER — TELEPHONE (OUTPATIENT)
Dept: NEUROSURGERY | Facility: CLINIC | Age: 44
End: 2024-05-29
Payer: COMMERCIAL

## 2024-05-29 ENCOUNTER — OFFICE VISIT (OUTPATIENT)
Dept: ONCOLOGY | Facility: CLINIC | Age: 44
End: 2024-05-29
Payer: COMMERCIAL

## 2024-05-29 VITALS
OXYGEN SATURATION: 98 % | WEIGHT: 157 LBS | HEART RATE: 101 BPM | DIASTOLIC BLOOD PRESSURE: 87 MMHG | HEIGHT: 68 IN | BODY MASS INDEX: 23.79 KG/M2 | SYSTOLIC BLOOD PRESSURE: 135 MMHG | TEMPERATURE: 97.3 F

## 2024-05-29 DIAGNOSIS — E83.119 HEMOCHROMATOSIS, UNSPECIFIED HEMOCHROMATOSIS TYPE: Primary | ICD-10-CM

## 2024-05-29 RX ORDER — PROGESTERONE 200 MG/1
200 CAPSULE ORAL AS NEEDED
COMMUNITY

## 2024-05-29 RX ORDER — DOCUSATE SODIUM 100 MG/1
100 CAPSULE, LIQUID FILLED ORAL AS NEEDED
COMMUNITY

## 2024-05-29 RX ORDER — CLOTRIMAZOLE 10 MG/1
10 LOZENGE ORAL; TOPICAL AS NEEDED
COMMUNITY

## 2024-05-29 RX ORDER — LEVOMEFOLATE CALCIUM 15 MG
15 TABLET ORAL DAILY
COMMUNITY

## 2024-05-29 RX ORDER — MULTIVIT-MIN/IRON/FOLIC ACID/K 18-600-40
500 CAPSULE ORAL
COMMUNITY

## 2024-05-29 RX ORDER — DEXTROAMPHETAMINE SACCHARATE, AMPHETAMINE ASPARTATE, DEXTROAMPHETAMINE SULFATE AND AMPHETAMINE SULFATE 2.5; 2.5; 2.5; 2.5 MG/1; MG/1; MG/1; MG/1
TABLET ORAL
COMMUNITY
Start: 2024-05-09

## 2024-05-29 RX ORDER — MULTIVIT WITH MINERALS/LUTEIN
800 TABLET ORAL DAILY
COMMUNITY

## 2024-05-29 RX ORDER — CHLORAL HYDRATE 500 MG
CAPSULE ORAL
COMMUNITY
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
## 2024-05-29 NOTE — TELEPHONE ENCOUNTER
Spoke with Ms. Douglass on the telephone today. She would like to wait 2 weeks before she gets her MRI to ensure that she is not pregnant. She can continue her attempts to conceive as there are no risks to her meningioma. She just will not be able to receive gadolinium contrast with an MRI while she is pregnant.     Brandy Morris PA-C

## 2024-07-10 ENCOUNTER — TELEPHONE (OUTPATIENT)
Dept: ONCOLOGY | Facility: CLINIC | Age: 44
End: 2024-07-10

## 2024-07-10 NOTE — TELEPHONE ENCOUNTER
Caller: Sylvia Douglass    Relationship to patient: Self    Best call back number: [phone]     Chief complaint: PATIENT TO RESCHEDULE 8/27/24    Type of visit: FU1    Requested date: NEXT AVAILABLE. PATIENT HAS ANOTHER APPT SAME TIME AND DAY THAT CANNOT BE RESCHEDULED

## 2024-07-13 ENCOUNTER — TRANSCRIBE ORDERS (OUTPATIENT)
Dept: LAB | Facility: HOSPITAL | Age: 44
End: 2024-07-13
Payer: COMMERCIAL

## 2024-07-13 ENCOUNTER — LAB (OUTPATIENT)
Dept: LAB | Facility: HOSPITAL | Age: 44
End: 2024-07-13
Payer: COMMERCIAL

## 2024-07-13 DIAGNOSIS — E55.9 VITAMIN D DEFICIENCY DISEASE: ICD-10-CM

## 2024-07-13 DIAGNOSIS — N92.0 EXCESSIVE OR FREQUENT MENSTRUATION: Primary | ICD-10-CM

## 2024-07-13 DIAGNOSIS — N92.0 EXCESSIVE OR FREQUENT MENSTRUATION: ICD-10-CM

## 2024-07-13 DIAGNOSIS — E03.9 HYPOTHYROIDISM, UNSPECIFIED TYPE: ICD-10-CM

## 2024-07-13 LAB
25(OH)D3 SERPL-MCNC: 85.2 NG/ML (ref 30–100)
ESTRADIOL SERPL HS-MCNC: 13.5 PG/ML
FSH SERPL-ACNC: 21.1 MIU/ML
GLUCOSE SERPL-MCNC: 93 MG/DL (ref 65–99)
PROLACTIN SERPL-MCNC: 11.3 NG/ML (ref 4.79–23.3)
TESTOST SERPL-MCNC: 6.97 NG/DL (ref 8.4–48.1)
TSH SERPL DL<=0.05 MIU/L-ACNC: 0.9 UIU/ML (ref 0.27–4.2)

## 2024-07-13 PROCEDURE — 83001 ASSAY OF GONADOTROPIN (FSH): CPT

## 2024-07-13 PROCEDURE — 84443 ASSAY THYROID STIM HORMONE: CPT

## 2024-07-13 PROCEDURE — 82947 ASSAY GLUCOSE BLOOD QUANT: CPT

## 2024-07-13 PROCEDURE — 36415 COLL VENOUS BLD VENIPUNCTURE: CPT

## 2024-07-13 PROCEDURE — 82306 VITAMIN D 25 HYDROXY: CPT

## 2024-07-13 PROCEDURE — 82670 ASSAY OF TOTAL ESTRADIOL: CPT

## 2024-07-13 PROCEDURE — 82397 CHEMILUMINESCENT ASSAY: CPT

## 2024-07-13 PROCEDURE — 84146 ASSAY OF PROLACTIN: CPT

## 2024-07-13 PROCEDURE — 83525 ASSAY OF INSULIN: CPT

## 2024-07-13 PROCEDURE — 84403 ASSAY OF TOTAL TESTOSTERONE: CPT

## 2024-07-18 ENCOUNTER — TELEPHONE (OUTPATIENT)
Dept: ONCOLOGY | Facility: CLINIC | Age: 44
End: 2024-07-18

## 2024-07-18 LAB — MIS SERPL-MCNC: 0.07 NG/ML

## 2024-07-18 NOTE — TELEPHONE ENCOUNTER
Caller: Sylvia Douglass    Relationship to patient: Self    Best call back number: [phone]    Chief complaint: R/S    Type of visit: FOLLOW UP 1    Requested date: NEXT AVLIABLE AFTER 3, PT DOES NOT WANT TO GO TO FAR OUT, PLEASE CONTACT TO ADVISE     If rescheduling, when is the original appointment: 9-3

## 2024-07-21 LAB — INSULIN SERPL-ACNC: 2.1 UIU/ML

## 2024-10-10 ENCOUNTER — HOSPITAL ENCOUNTER (EMERGENCY)
Facility: HOSPITAL | Age: 44
Discharge: HOME | End: 2024-10-10
Attending: EMERGENCY MEDICINE
Payer: COMMERCIAL

## 2024-10-10 ENCOUNTER — APPOINTMENT (OUTPATIENT)
Facility: HOSPITAL | Age: 44
End: 2024-10-10
Payer: COMMERCIAL

## 2024-10-10 VITALS
OXYGEN SATURATION: 100 % | BODY MASS INDEX: 20.46 KG/M2 | TEMPERATURE: 98 F | SYSTOLIC BLOOD PRESSURE: 113 MMHG | HEIGHT: 68 IN | HEART RATE: 63 BPM | DIASTOLIC BLOOD PRESSURE: 80 MMHG | RESPIRATION RATE: 16 BRPM | WEIGHT: 135 LBS

## 2024-10-10 DIAGNOSIS — S22.32XA CLOSED FRACTURE OF ONE RIB OF LEFT SIDE, INITIAL ENCOUNTER: Primary | ICD-10-CM

## 2024-10-10 DIAGNOSIS — R07.81 RIB PAIN ON LEFT SIDE: ICD-10-CM

## 2024-10-10 LAB
ALBUMIN SERPL-MCNC: 4.9 G/DL (ref 3.5–5.2)
ALBUMIN/GLOB SERPL: 2 G/DL
ALP SERPL-CCNC: 111 U/L (ref 39–117)
ALT SERPL W P-5'-P-CCNC: 27 U/L (ref 1–33)
ANION GAP SERPL CALCULATED.3IONS-SCNC: 10.9 MMOL/L (ref 5–15)
AST SERPL-CCNC: 29 U/L (ref 1–32)
B-HCG UR QL: NEGATIVE
BASOPHILS # BLD AUTO: 0.01 10*3/MM3 (ref 0–0.2)
BASOPHILS NFR BLD AUTO: 0.1 % (ref 0–1.5)
BILIRUB SERPL-MCNC: <0.2 MG/DL (ref 0–1.2)
BILIRUB UR QL STRIP: NEGATIVE
BUN SERPL-MCNC: 5 MG/DL (ref 6–20)
BUN/CREAT SERPL: 8.6 (ref 7–25)
CALCIUM SPEC-SCNC: 7.8 MG/DL (ref 8.6–10.5)
CHLORIDE SERPL-SCNC: 99 MMOL/L (ref 98–107)
CLARITY UR: CLEAR
CO2 SERPL-SCNC: 27.1 MMOL/L (ref 22–29)
COLOR UR: YELLOW
CREAT SERPL-MCNC: 0.58 MG/DL (ref 0.57–1)
DEPRECATED RDW RBC AUTO: 49.7 FL (ref 37–54)
EGFRCR SERPLBLD CKD-EPI 2021: 114.6 ML/MIN/1.73
EOSINOPHIL # BLD AUTO: 0.13 10*3/MM3 (ref 0–0.4)
EOSINOPHIL NFR BLD AUTO: 1.8 % (ref 0.3–6.2)
ERYTHROCYTE [DISTWIDTH] IN BLOOD BY AUTOMATED COUNT: 14.3 % (ref 12.3–15.4)
EXPIRATION DATE: NORMAL
GLOBULIN UR ELPH-MCNC: 2.4 GM/DL
GLUCOSE SERPL-MCNC: 106 MG/DL (ref 65–99)
GLUCOSE UR STRIP-MCNC: NEGATIVE MG/DL
HCT VFR BLD AUTO: 42.3 % (ref 34–46.6)
HGB BLD-MCNC: 13.8 G/DL (ref 12–15.9)
HGB UR QL STRIP.AUTO: NEGATIVE
IMM GRANULOCYTES # BLD AUTO: 0.01 10*3/MM3 (ref 0–0.05)
IMM GRANULOCYTES NFR BLD AUTO: 0.1 % (ref 0–0.5)
INTERNAL NEGATIVE CONTROL: NORMAL
INTERNAL POSITIVE CONTROL: NORMAL
KETONES UR QL STRIP: NEGATIVE
LEUKOCYTE ESTERASE UR QL STRIP.AUTO: NEGATIVE
LYMPHOCYTES # BLD AUTO: 2.73 10*3/MM3 (ref 0.7–3.1)
LYMPHOCYTES NFR BLD AUTO: 37.6 % (ref 19.6–45.3)
Lab: NORMAL
MCH RBC QN AUTO: 30.6 PG (ref 26.6–33)
MCHC RBC AUTO-ENTMCNC: 32.6 G/DL (ref 31.5–35.7)
MCV RBC AUTO: 93.8 FL (ref 79–97)
MONOCYTES # BLD AUTO: 0.37 10*3/MM3 (ref 0.1–0.9)
MONOCYTES NFR BLD AUTO: 5.1 % (ref 5–12)
NEUTROPHILS NFR BLD AUTO: 4.01 10*3/MM3 (ref 1.7–7)
NEUTROPHILS NFR BLD AUTO: 55.3 % (ref 42.7–76)
NITRITE UR QL STRIP: NEGATIVE
PH UR STRIP.AUTO: 6 [PH] (ref 5–8)
PLATELET # BLD AUTO: 385 10*3/MM3 (ref 140–450)
PMV BLD AUTO: 10.1 FL (ref 6–12)
POTASSIUM SERPL-SCNC: 4.1 MMOL/L (ref 3.5–5.2)
PROT SERPL-MCNC: 7.3 G/DL (ref 6–8.5)
PROT UR QL STRIP: NEGATIVE
RBC # BLD AUTO: 4.51 10*6/MM3 (ref 3.77–5.28)
SODIUM SERPL-SCNC: 137 MMOL/L (ref 136–145)
SP GR UR STRIP: <=1.005 (ref 1–1.03)
UROBILINOGEN UR QL STRIP: NORMAL
WBC NRBC COR # BLD AUTO: 7.26 10*3/MM3 (ref 3.4–10.8)

## 2024-10-10 PROCEDURE — 81003 URINALYSIS AUTO W/O SCOPE: CPT | Performed by: PHYSICIAN ASSISTANT

## 2024-10-10 PROCEDURE — 74176 CT ABD & PELVIS W/O CONTRAST: CPT

## 2024-10-10 PROCEDURE — 25010000002 KETOROLAC TROMETHAMINE PER 15 MG: Performed by: PHYSICIAN ASSISTANT

## 2024-10-10 PROCEDURE — 36415 COLL VENOUS BLD VENIPUNCTURE: CPT

## 2024-10-10 PROCEDURE — 96372 THER/PROPH/DIAG INJ SC/IM: CPT

## 2024-10-10 PROCEDURE — 85025 COMPLETE CBC W/AUTO DIFF WBC: CPT | Performed by: PHYSICIAN ASSISTANT

## 2024-10-10 PROCEDURE — 80053 COMPREHEN METABOLIC PANEL: CPT | Performed by: PHYSICIAN ASSISTANT

## 2024-10-10 PROCEDURE — 99284 EMERGENCY DEPT VISIT MOD MDM: CPT

## 2024-10-10 PROCEDURE — 81025 URINE PREGNANCY TEST: CPT | Performed by: PHYSICIAN ASSISTANT

## 2024-10-10 RX ORDER — KETOROLAC TROMETHAMINE 15 MG/ML
15 INJECTION, SOLUTION INTRAMUSCULAR; INTRAVENOUS ONCE
Status: COMPLETED | OUTPATIENT
Start: 2024-10-10 | End: 2024-10-10

## 2024-10-10 RX ORDER — KETOROLAC TROMETHAMINE 15 MG/ML
15 INJECTION, SOLUTION INTRAMUSCULAR; INTRAVENOUS ONCE
Status: DISCONTINUED | OUTPATIENT
Start: 2024-10-10 | End: 2024-10-10

## 2024-10-10 RX ADMIN — KETOROLAC TROMETHAMINE 15 MG: 15 INJECTION, SOLUTION INTRAMUSCULAR; INTRAVENOUS at 20:39

## 2024-10-10 NOTE — Clinical Note
[facility] EMERGENCY DEPARTMENT [place]  [address]   [place] [facility] [place] [address]  Phone: [phone]  Fax: [phone]    Sylvia Douglass was seen and treated in our emergency department on 10/10/2024.  She may return to work on 10/14/2024.  Follow up with primary care provider        Thank you for choosing [facility].    Carter Pelletier MD

## 2024-10-11 NOTE — DISCHARGE INSTRUCTIONS
Symptomatic care is recommended. Take all medications as prescribed and instructed.  Use incentive spirometer as directed.  Follow up with primary care and pain management as directed or return to Emergency Department with worsening of symptoms.
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
## 2025-02-05 ENCOUNTER — OFFICE VISIT (OUTPATIENT)
Dept: NEUROSURGERY | Facility: CLINIC | Age: 45
End: 2025-02-05
Payer: MEDICAID

## 2025-02-05 VITALS — BODY MASS INDEX: 21.89 KG/M2 | TEMPERATURE: 97.5 F | HEIGHT: 68 IN | WEIGHT: 144.4 LBS

## 2025-02-05 DIAGNOSIS — G56.01 CARPAL TUNNEL SYNDROME OF RIGHT WRIST: ICD-10-CM

## 2025-02-05 DIAGNOSIS — R51.9 CHRONIC NONINTRACTABLE HEADACHE, UNSPECIFIED HEADACHE TYPE: ICD-10-CM

## 2025-02-05 DIAGNOSIS — G89.29 CHRONIC NONINTRACTABLE HEADACHE, UNSPECIFIED HEADACHE TYPE: ICD-10-CM

## 2025-02-05 DIAGNOSIS — M47.812 CERVICAL SPONDYLOSIS WITHOUT MYELOPATHY: ICD-10-CM

## 2025-02-05 DIAGNOSIS — M50.30 DDD (DEGENERATIVE DISC DISEASE), CERVICAL: ICD-10-CM

## 2025-02-05 DIAGNOSIS — D32.9 MENINGIOMA: Primary | ICD-10-CM

## 2025-02-05 PROCEDURE — 1159F MED LIST DOCD IN RCRD: CPT | Performed by: PHYSICIAN ASSISTANT

## 2025-02-05 PROCEDURE — 1160F RVW MEDS BY RX/DR IN RCRD: CPT | Performed by: PHYSICIAN ASSISTANT

## 2025-02-05 PROCEDURE — 99214 OFFICE O/P EST MOD 30 MIN: CPT | Performed by: PHYSICIAN ASSISTANT

## 2025-02-05 RX ORDER — LORATADINE 10 MG/1
TABLET ORAL
COMMUNITY
Start: 2025-01-21

## 2025-02-05 RX ORDER — DEXTROAMPHETAMINE SACCHARATE, AMPHETAMINE ASPARTATE, DEXTROAMPHETAMINE SULFATE AND AMPHETAMINE SULFATE 7.5; 7.5; 7.5; 7.5 MG/1; MG/1; MG/1; MG/1
30 TABLET ORAL
COMMUNITY

## 2025-02-05 RX ORDER — AZELASTINE HYDROCHLORIDE, FLUTICASONE PROPIONATE 137; 50 UG/1; UG/1
SPRAY, METERED NASAL
COMMUNITY
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
## 2025-02-21 ENCOUNTER — TELEPHONE (OUTPATIENT)
Dept: NEUROSURGERY | Facility: CLINIC | Age: 45
End: 2025-02-21
Payer: MEDICAID

## 2025-02-21 DIAGNOSIS — D32.9 MENINGIOMA: Primary | ICD-10-CM

## 2025-02-21 NOTE — TELEPHONE ENCOUNTER
Provider: JULIO AGUERO    Caller: WANDER DAVID    Relationship to Patient: SELF      Phone Number: [phone]    Reason for Call: SHE WANTED TO MAKE SURE THAT THE MRI OF HER BRAIN IS ONLY SUPPOSED TO BE W/OUT CONTRAST. SHE STATES IN THE PAST IT HAS ALWAYS BEEN WITH AND WITHOUT. PLEASE ADVISE.     SHE NEEDS TO RESCHEDULE HER MRI'S AND APPOINTMENT, SHE MISSED THEM D/T WEATHER.  SHE WANTED TO MAKE SURE THIS ORDER WAS CORRECT PRIOR TO RESCHEDULING.  THANK YOU.    When was the patient last seen: 2-5-25

## 2025-02-28 ENCOUNTER — APPOINTMENT (OUTPATIENT)
Facility: HOSPITAL | Age: 45
End: 2025-02-28
Payer: MEDICAID

## 2025-02-28 ENCOUNTER — HOSPITAL ENCOUNTER (EMERGENCY)
Facility: HOSPITAL | Age: 45
Discharge: HOME | End: 2025-03-01
Attending: EMERGENCY MEDICINE
Payer: MEDICAID

## 2025-02-28 VITALS
OXYGEN SATURATION: 100 % | HEART RATE: 79 BPM | WEIGHT: 146 LBS | RESPIRATION RATE: 20 BRPM | DIASTOLIC BLOOD PRESSURE: 78 MMHG | TEMPERATURE: 97.7 F | BODY MASS INDEX: 22.13 KG/M2 | SYSTOLIC BLOOD PRESSURE: 112 MMHG | HEIGHT: 68 IN

## 2025-02-28 DIAGNOSIS — M79.18 MUSCULOSKELETAL PAIN: Primary | ICD-10-CM

## 2025-02-28 PROCEDURE — 99283 EMERGENCY DEPT VISIT LOW MDM: CPT

## 2025-02-28 PROCEDURE — 71111 X-RAY EXAM RIBS/CHEST4/> VWS: CPT

## 2025-02-28 PROCEDURE — 81025 URINE PREGNANCY TEST: CPT | Performed by: EMERGENCY MEDICINE

## 2025-02-28 PROCEDURE — 36415 COLL VENOUS BLD VENIPUNCTURE: CPT

## 2025-02-28 PROCEDURE — 84702 CHORIONIC GONADOTROPIN TEST: CPT | Performed by: EMERGENCY MEDICINE

## 2025-03-01 LAB — HCG INTACT+B SERPL-ACNC: <0.2 MIU/ML
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
## 2025-04-10 ENCOUNTER — LAB (OUTPATIENT)
Facility: HOSPITAL | Age: 45
End: 2025-04-10
Payer: MEDICAID

## 2025-04-10 ENCOUNTER — TRANSCRIBE ORDERS (OUTPATIENT)
Facility: HOSPITAL | Age: 45
End: 2025-04-10
Payer: MEDICAID

## 2025-04-10 DIAGNOSIS — N92.6 IRREGULAR MENSTRUAL CYCLE: ICD-10-CM

## 2025-04-10 DIAGNOSIS — N92.6 IRREGULAR MENSTRUAL CYCLE: Primary | ICD-10-CM

## 2025-04-10 LAB
ESTRADIOL SERPL HS-MCNC: 152 PG/ML
FSH SERPL-ACNC: 13.5 MIU/ML
LH SERPL-ACNC: 7.97 MIU/ML
PROGEST SERPL-MCNC: 0.21 NG/ML
T4 FREE SERPL-MCNC: 1.22 NG/DL (ref 0.92–1.68)
TSH SERPL DL<=0.05 MIU/L-ACNC: 0.94 UIU/ML (ref 0.27–4.2)

## 2025-04-10 PROCEDURE — 82670 ASSAY OF TOTAL ESTRADIOL: CPT

## 2025-04-10 PROCEDURE — 83001 ASSAY OF GONADOTROPIN (FSH): CPT

## 2025-04-10 PROCEDURE — 82397 CHEMILUMINESCENT ASSAY: CPT

## 2025-04-10 PROCEDURE — 84402 ASSAY OF FREE TESTOSTERONE: CPT

## 2025-04-10 PROCEDURE — 84403 ASSAY OF TOTAL TESTOSTERONE: CPT

## 2025-04-10 PROCEDURE — 84439 ASSAY OF FREE THYROXINE: CPT

## 2025-04-10 PROCEDURE — 84443 ASSAY THYROID STIM HORMONE: CPT

## 2025-04-10 PROCEDURE — 82627 DEHYDROEPIANDROSTERONE: CPT

## 2025-04-10 PROCEDURE — 83002 ASSAY OF GONADOTROPIN (LH): CPT

## 2025-04-10 PROCEDURE — 36415 COLL VENOUS BLD VENIPUNCTURE: CPT

## 2025-04-10 PROCEDURE — 84144 ASSAY OF PROGESTERONE: CPT

## 2025-04-12 LAB — DHEA-S SERPL-MCNC: 52.5 UG/DL (ref 57.3–279.2)

## 2025-04-14 LAB — MIS SERPL-MCNC: 0.05 NG/ML

## 2025-04-15 LAB
TESTOST FREE SERPL-MCNC: 0.3 PG/ML (ref 0–4.2)
TESTOST SERPL-MCNC: 4 NG/DL (ref 4–50)

## 2025-05-05 ENCOUNTER — TELEPHONE (OUTPATIENT)
Dept: ONCOLOGY | Facility: CLINIC | Age: 45
End: 2025-05-05

## 2025-05-05 NOTE — TELEPHONE ENCOUNTER
Caller: Sylvia Douglass    Relationship to patient: Self    Best call back number: [phone]    Chief complaint: PATIENT CALLED TO RESCHEDULE     Type of visit: LAB AND FOLLOW UP     Requested date: PLEASE CALL PATIENT TO SCHEDULE      If rescheduling, when is the original appointment: 9-23-24

## 2025-05-13 ENCOUNTER — OFFICE VISIT (OUTPATIENT)
Dept: NEUROSURGERY | Facility: CLINIC | Age: 45
End: 2025-05-13
Payer: MEDICAID

## 2025-05-13 VITALS
HEIGHT: 68 IN | DIASTOLIC BLOOD PRESSURE: 68 MMHG | BODY MASS INDEX: 21.95 KG/M2 | WEIGHT: 144.8 LBS | SYSTOLIC BLOOD PRESSURE: 118 MMHG

## 2025-05-13 DIAGNOSIS — D32.9 MENINGIOMA: Primary | ICD-10-CM

## 2025-05-13 DIAGNOSIS — G89.29 CHRONIC NECK PAIN: ICD-10-CM

## 2025-05-13 DIAGNOSIS — M50.30 DDD (DEGENERATIVE DISC DISEASE), CERVICAL: ICD-10-CM

## 2025-05-13 DIAGNOSIS — M54.2 CHRONIC NECK PAIN: ICD-10-CM

## 2025-05-13 RX ORDER — DOXYCYCLINE HYCLATE 20 MG
TABLET ORAL
COMMUNITY
Start: 2025-01-30

## 2025-05-13 RX ORDER — ALBUTEROL SULFATE 90 UG/1
INHALANT RESPIRATORY (INHALATION)
COMMUNITY
Start: 2025-04-18

## 2025-05-14 NOTE — TELEPHONE ENCOUNTER
Caller: Sylvia Douglass    Relationship: Self    Best call back number: [phone]     What is the best time to reach you: ASAP    Who are you requesting to speak with (clinical staff, provider,  specific staff member): SCHEDULING        What was the call regarding: PATIENT STILL NEEDS A CALL BACK TO GET THIS APPT RESCHEDULED.  SHE CAN NOT DO 5/20.  THE LOCATION DOES NOT MATTER AS LONG AS SHE SEES DR MORALES, BUT SHE NEEDS THE DATE CHANGED AND SHE NEEDS TO DISCUSS LAB ORDERS TO BE DONE BEFOREHAND.  PLEASE CALL PATIENT TO RESCHEDULE.

## 2025-05-15 ENCOUNTER — TELEPHONE (OUTPATIENT)
Age: 45
End: 2025-05-15
Payer: MEDICAID

## 2025-05-15 DIAGNOSIS — E83.119 HEMOCHROMATOSIS, UNSPECIFIED HEMOCHROMATOSIS TYPE: Primary | ICD-10-CM

## 2025-05-15 NOTE — TELEPHONE ENCOUNTER
----- Message from Lenka LOPEZ sent at 5/14/2025  3:55 PM EDT -----  Patient is coming in next Wednesday for a follow up. She said that it has been a year since she has seen Dr fuentes and cannot remember if she needs labs before she sees her. I looked in her chart and have not seen  any labs that she has placed for her to get done. Can you please check with Dr Fuentes and call the patient and let her know what she needs to do and if she needs to get labs done.
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
## 2025-05-18 ENCOUNTER — LAB (OUTPATIENT)
Dept: LAB | Facility: HOSPITAL | Age: 45
End: 2025-05-18
Payer: MEDICAID

## 2025-05-18 DIAGNOSIS — E83.119 HEMOCHROMATOSIS, UNSPECIFIED HEMOCHROMATOSIS TYPE: ICD-10-CM

## 2025-05-18 LAB
ALBUMIN SERPL-MCNC: 4.1 G/DL (ref 3.5–5.2)
ALBUMIN/GLOB SERPL: 1.8 G/DL
ALP SERPL-CCNC: 122 U/L (ref 39–117)
ALT SERPL W P-5'-P-CCNC: 17 U/L (ref 1–33)
ANION GAP SERPL CALCULATED.3IONS-SCNC: 10 MMOL/L (ref 5–15)
AST SERPL-CCNC: 19 U/L (ref 1–32)
BASOPHILS # BLD AUTO: 0.03 10*3/MM3 (ref 0–0.2)
BASOPHILS NFR BLD AUTO: 0.4 % (ref 0–1.5)
BILIRUB SERPL-MCNC: 0.2 MG/DL (ref 0–1.2)
BUN SERPL-MCNC: 9 MG/DL (ref 6–20)
BUN/CREAT SERPL: 16.4 (ref 7–25)
CALCIUM SPEC-SCNC: 8.9 MG/DL (ref 8.6–10.5)
CHLORIDE SERPL-SCNC: 98 MMOL/L (ref 98–107)
CO2 SERPL-SCNC: 28 MMOL/L (ref 22–29)
CREAT SERPL-MCNC: 0.55 MG/DL (ref 0.57–1)
DEPRECATED RDW RBC AUTO: 47.6 FL (ref 37–54)
EGFRCR SERPLBLD CKD-EPI 2021: 116.1 ML/MIN/1.73
EOSINOPHIL # BLD AUTO: 0.23 10*3/MM3 (ref 0–0.4)
EOSINOPHIL NFR BLD AUTO: 3.2 % (ref 0.3–6.2)
ERYTHROCYTE [DISTWIDTH] IN BLOOD BY AUTOMATED COUNT: 13.2 % (ref 12.3–15.4)
FERRITIN SERPL-MCNC: 59.5 NG/ML (ref 13–150)
GLOBULIN UR ELPH-MCNC: 2.3 GM/DL
GLUCOSE SERPL-MCNC: 100 MG/DL (ref 65–99)
HCT VFR BLD AUTO: 38.3 % (ref 34–46.6)
HGB BLD-MCNC: 12.3 G/DL (ref 12–15.9)
IMM GRANULOCYTES # BLD AUTO: 0.02 10*3/MM3 (ref 0–0.05)
IMM GRANULOCYTES NFR BLD AUTO: 0.3 % (ref 0–0.5)
IRON 24H UR-MRATE: 94 MCG/DL (ref 37–145)
IRON SATN MFR SERPL: 24 % (ref 20–50)
LYMPHOCYTES # BLD AUTO: 2.12 10*3/MM3 (ref 0.7–3.1)
LYMPHOCYTES NFR BLD AUTO: 29.1 % (ref 19.6–45.3)
MCH RBC QN AUTO: 30.9 PG (ref 26.6–33)
MCHC RBC AUTO-ENTMCNC: 32.1 G/DL (ref 31.5–35.7)
MCV RBC AUTO: 96.2 FL (ref 79–97)
MONOCYTES # BLD AUTO: 0.48 10*3/MM3 (ref 0.1–0.9)
MONOCYTES NFR BLD AUTO: 6.6 % (ref 5–12)
NEUTROPHILS NFR BLD AUTO: 4.4 10*3/MM3 (ref 1.7–7)
NEUTROPHILS NFR BLD AUTO: 60.4 % (ref 42.7–76)
NRBC BLD AUTO-RTO: 0 /100 WBC (ref 0–0.2)
PLATELET # BLD AUTO: 278 10*3/MM3 (ref 140–450)
PMV BLD AUTO: 10.5 FL (ref 6–12)
POTASSIUM SERPL-SCNC: 4.4 MMOL/L (ref 3.5–5.2)
PROT SERPL-MCNC: 6.4 G/DL (ref 6–8.5)
RBC # BLD AUTO: 3.98 10*6/MM3 (ref 3.77–5.28)
SODIUM SERPL-SCNC: 136 MMOL/L (ref 136–145)
TIBC SERPL-MCNC: 398 MCG/DL (ref 298–536)
TRANSFERRIN SERPL-MCNC: 267 MG/DL (ref 200–360)
WBC NRBC COR # BLD AUTO: 7.28 10*3/MM3 (ref 3.4–10.8)

## 2025-05-18 PROCEDURE — 80053 COMPREHEN METABOLIC PANEL: CPT

## 2025-05-18 PROCEDURE — 36415 COLL VENOUS BLD VENIPUNCTURE: CPT

## 2025-05-18 PROCEDURE — 84466 ASSAY OF TRANSFERRIN: CPT

## 2025-05-18 PROCEDURE — 83540 ASSAY OF IRON: CPT

## 2025-05-18 PROCEDURE — 85025 COMPLETE CBC W/AUTO DIFF WBC: CPT

## 2025-05-18 PROCEDURE — 82728 ASSAY OF FERRITIN: CPT

## 2025-05-21 ENCOUNTER — OFFICE VISIT (OUTPATIENT)
Dept: ONCOLOGY | Facility: CLINIC | Age: 45
End: 2025-05-21
Payer: MEDICAID

## 2025-05-21 VITALS
TEMPERATURE: 97.1 F | WEIGHT: 146 LBS | BODY MASS INDEX: 22.13 KG/M2 | HEART RATE: 91 BPM | OXYGEN SATURATION: 97 % | HEIGHT: 68 IN | DIASTOLIC BLOOD PRESSURE: 88 MMHG | SYSTOLIC BLOOD PRESSURE: 135 MMHG

## 2025-05-21 DIAGNOSIS — E83.119 HEMOCHROMATOSIS, UNSPECIFIED HEMOCHROMATOSIS TYPE: Primary | ICD-10-CM

## 2025-05-21 PROCEDURE — 99214 OFFICE O/P EST MOD 30 MIN: CPT | Performed by: INTERNAL MEDICINE

## 2025-05-21 PROCEDURE — 1126F AMNT PAIN NOTED NONE PRSNT: CPT | Performed by: INTERNAL MEDICINE
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
## 2025-05-27 ENCOUNTER — TELEPHONE (OUTPATIENT)
Dept: ONCOLOGY | Facility: CLINIC | Age: 45
End: 2025-05-27
Payer: MEDICAID

## 2025-05-27 NOTE — TELEPHONE ENCOUNTER
Called to advise of location and instructions regarding MRI on 6/10/25.  No answer received. Left  requesting return call.

## 2025-05-29 NOTE — TELEPHONE ENCOUNTER
Spoke with patient earlier this date and advised MRI can only be performed at [place] location at [facility].  Patient asked for an early morning appointment.  Called patient back, no answer received.  Left VM advising of new appointment on 6/17/25 and instructions associated with the MRI appointment.  Asked her to return call with any questions.

## 2025-06-11 ENCOUNTER — TRANSCRIBE ORDERS (OUTPATIENT)
Dept: LAB | Facility: HOSPITAL | Age: 45
End: 2025-06-11
Payer: MEDICAID

## 2025-06-11 ENCOUNTER — LAB (OUTPATIENT)
Dept: LAB | Facility: HOSPITAL | Age: 45
End: 2025-06-11
Payer: MEDICAID

## 2025-06-11 DIAGNOSIS — E83.119 HEMOCHROMATOSIS, UNSPECIFIED HEMOCHROMATOSIS TYPE: ICD-10-CM

## 2025-06-11 DIAGNOSIS — N91.2 ABSENCE OF MENSTRUATION: ICD-10-CM

## 2025-06-11 DIAGNOSIS — N91.2 ABSENCE OF MENSTRUATION: Primary | ICD-10-CM

## 2025-06-11 LAB
ALBUMIN SERPL-MCNC: 4.6 G/DL (ref 3.5–5.2)
ALBUMIN/GLOB SERPL: 1.5 G/DL
ALP SERPL-CCNC: 136 U/L (ref 39–117)
ALT SERPL W P-5'-P-CCNC: 15 U/L (ref 1–33)
ANION GAP SERPL CALCULATED.3IONS-SCNC: 12 MMOL/L (ref 5–15)
AST SERPL-CCNC: 20 U/L (ref 1–32)
BASOPHILS # BLD AUTO: 0.02 10*3/MM3 (ref 0–0.2)
BASOPHILS NFR BLD AUTO: 0.2 % (ref 0–1.5)
BILIRUB SERPL-MCNC: 0.2 MG/DL (ref 0–1.2)
BUN SERPL-MCNC: 6.1 MG/DL (ref 6–20)
BUN/CREAT SERPL: 11.7 (ref 7–25)
CALCIUM SPEC-SCNC: 9.7 MG/DL (ref 8.6–10.5)
CHLORIDE SERPL-SCNC: 99 MMOL/L (ref 98–107)
CO2 SERPL-SCNC: 28 MMOL/L (ref 22–29)
CREAT SERPL-MCNC: 0.52 MG/DL (ref 0.57–1)
DEPRECATED RDW RBC AUTO: 44.5 FL (ref 37–54)
EGFRCR SERPLBLD CKD-EPI 2021: 117.7 ML/MIN/1.73
EOSINOPHIL # BLD AUTO: 0.12 10*3/MM3 (ref 0–0.4)
EOSINOPHIL NFR BLD AUTO: 1.4 % (ref 0.3–6.2)
ERYTHROCYTE [DISTWIDTH] IN BLOOD BY AUTOMATED COUNT: 13 % (ref 12.3–15.4)
FERRITIN SERPL-MCNC: 29.61 NG/ML (ref 13–150)
GLOBULIN UR ELPH-MCNC: 3 GM/DL
GLUCOSE SERPL-MCNC: 106 MG/DL (ref 65–99)
HBV SURFACE AG SERPL QL IA: NORMAL
HCT VFR BLD AUTO: 38.8 % (ref 34–46.6)
HGB BLD-MCNC: 12.5 G/DL (ref 12–15.9)
IMM GRANULOCYTES # BLD AUTO: 0.03 10*3/MM3 (ref 0–0.05)
IMM GRANULOCYTES NFR BLD AUTO: 0.4 % (ref 0–0.5)
LYMPHOCYTES # BLD AUTO: 3.2 10*3/MM3 (ref 0.7–3.1)
LYMPHOCYTES NFR BLD AUTO: 37.3 % (ref 19.6–45.3)
MCH RBC QN AUTO: 29.9 PG (ref 26.6–33)
MCHC RBC AUTO-ENTMCNC: 32.2 G/DL (ref 31.5–35.7)
MCV RBC AUTO: 92.8 FL (ref 79–97)
MONOCYTES # BLD AUTO: 0.41 10*3/MM3 (ref 0.1–0.9)
MONOCYTES NFR BLD AUTO: 4.8 % (ref 5–12)
NEUTROPHILS NFR BLD AUTO: 4.79 10*3/MM3 (ref 1.7–7)
NEUTROPHILS NFR BLD AUTO: 55.9 % (ref 42.7–76)
NRBC BLD AUTO-RTO: 0 /100 WBC (ref 0–0.2)
PLATELET # BLD AUTO: 349 10*3/MM3 (ref 140–450)
PMV BLD AUTO: 10.8 FL (ref 6–12)
POTASSIUM SERPL-SCNC: 3.4 MMOL/L (ref 3.5–5.2)
PROT SERPL-MCNC: 7.6 G/DL (ref 6–8.5)
RBC # BLD AUTO: 4.18 10*6/MM3 (ref 3.77–5.28)
SODIUM SERPL-SCNC: 139 MMOL/L (ref 136–145)
WBC NRBC COR # BLD AUTO: 8.57 10*3/MM3 (ref 3.4–10.8)

## 2025-06-11 PROCEDURE — 87591 N.GONORRHOEAE DNA AMP PROB: CPT

## 2025-06-11 PROCEDURE — 87491 CHLMYD TRACH DNA AMP PROBE: CPT

## 2025-06-11 PROCEDURE — 87661 TRICHOMONAS VAGINALIS AMPLIF: CPT

## 2025-06-11 PROCEDURE — 84144 ASSAY OF PROGESTERONE: CPT

## 2025-06-11 PROCEDURE — 87340 HEPATITIS B SURFACE AG IA: CPT

## 2025-06-11 PROCEDURE — 84402 ASSAY OF FREE TESTOSTERONE: CPT

## 2025-06-11 PROCEDURE — 83001 ASSAY OF GONADOTROPIN (FSH): CPT

## 2025-06-11 PROCEDURE — 82105 ALPHA-FETOPROTEIN SERUM: CPT

## 2025-06-11 PROCEDURE — G0432 EIA HIV-1/HIV-2 SCREEN: HCPCS

## 2025-06-11 PROCEDURE — 86803 HEPATITIS C AB TEST: CPT

## 2025-06-11 PROCEDURE — 82627 DEHYDROEPIANDROSTERONE: CPT

## 2025-06-11 PROCEDURE — 85025 COMPLETE CBC W/AUTO DIFF WBC: CPT

## 2025-06-11 PROCEDURE — 84702 CHORIONIC GONADOTROPIN TEST: CPT

## 2025-06-11 PROCEDURE — 36415 COLL VENOUS BLD VENIPUNCTURE: CPT

## 2025-06-11 PROCEDURE — 84403 ASSAY OF TOTAL TESTOSTERONE: CPT

## 2025-06-11 PROCEDURE — 82728 ASSAY OF FERRITIN: CPT

## 2025-06-11 PROCEDURE — 86780 TREPONEMA PALLIDUM: CPT

## 2025-06-11 PROCEDURE — 82670 ASSAY OF TOTAL ESTRADIOL: CPT

## 2025-06-11 PROCEDURE — 83002 ASSAY OF GONADOTROPIN (LH): CPT

## 2025-06-11 PROCEDURE — 80053 COMPREHEN METABOLIC PANEL: CPT

## 2025-06-12 LAB
ALPHA-FETOPROTEIN: 4.49 NG/ML (ref 0–8.3)
ESTRADIOL SERPL HS-MCNC: 94.2 PG/ML
FSH SERPL-ACNC: 8.03 MIU/ML
HCG INTACT+B SERPL-ACNC: <1 MIU/ML
HCV AB SER QL: NORMAL
HIV 1+2 AB+HIV1 P24 AG SERPL QL IA: NORMAL
LH SERPL-ACNC: 4.37 MIU/ML
PROGEST SERPL-MCNC: 1.66 NG/ML
TREPONEMA PALLIDUM IGG+IGM AB [PRESENCE] IN SERUM OR PLASMA BY IMMUNOASSAY: NORMAL

## 2025-06-13 ENCOUNTER — TELEPHONE (OUTPATIENT)
Dept: ONCOLOGY | Facility: CLINIC | Age: 45
End: 2025-06-13
Payer: MEDICAID

## 2025-06-13 LAB — DHEA-S SERPL-MCNC: 263 UG/DL (ref 57.3–279.2)

## 2025-06-13 NOTE — TELEPHONE ENCOUNTER
Return call to Sylvia.  Sylvia concerned about recent labs collected.  Reviewed values with Sylvia and explained nothing concerning with recent labs.

## 2025-06-13 NOTE — TELEPHONE ENCOUNTER
Patient called she already has given blood like Dr. Fuentes wanted her to do. She was at the OB yesterday, and they harpreet labs, but she thinks these are the ones Dr. Fuentes wanted, and wonders if that messed up anything? Also wants to know if they can be reviewed.

## 2025-06-14 LAB
TESTOST FREE SERPL-MCNC: 1.1 PG/ML (ref 0–4.2)
TESTOST SERPL-MCNC: 29 NG/DL (ref 4–50)

## 2025-06-16 LAB
C TRACH RRNA SPEC QL NAA+PROBE: NEGATIVE
N GONORRHOEA RRNA SPEC QL NAA+PROBE: NEGATIVE
T VAGINALIS RRNA SPEC QL NAA+PROBE: NEGATIVE

## 2025-06-17 ENCOUNTER — LAB (OUTPATIENT)
Dept: LAB | Facility: HOSPITAL | Age: 45
End: 2025-06-17
Payer: MEDICAID

## 2025-06-17 ENCOUNTER — TELEPHONE (OUTPATIENT)
Dept: ONCOLOGY | Facility: CLINIC | Age: 45
End: 2025-06-17
Payer: MEDICAID

## 2025-06-17 DIAGNOSIS — E83.119 HEMOCHROMATOSIS, UNSPECIFIED HEMOCHROMATOSIS TYPE: Primary | ICD-10-CM

## 2025-06-17 DIAGNOSIS — K90.9 INTESTINAL MALABSORPTION, UNSPECIFIED TYPE: ICD-10-CM

## 2025-06-17 DIAGNOSIS — E83.119 HEMOCHROMATOSIS, UNSPECIFIED HEMOCHROMATOSIS TYPE: ICD-10-CM

## 2025-06-17 LAB
BASOPHILS # BLD AUTO: 0.03 10*3/MM3 (ref 0–0.2)
BASOPHILS NFR BLD AUTO: 0.4 % (ref 0–1.5)
DEPRECATED RDW RBC AUTO: 46.6 FL (ref 37–54)
EOSINOPHIL # BLD AUTO: 0.23 10*3/MM3 (ref 0–0.4)
EOSINOPHIL NFR BLD AUTO: 2.7 % (ref 0.3–6.2)
ERYTHROCYTE [DISTWIDTH] IN BLOOD BY AUTOMATED COUNT: 13.3 % (ref 12.3–15.4)
FERRITIN SERPL-MCNC: 16.94 NG/ML (ref 13–150)
HCT VFR BLD AUTO: 35.3 % (ref 34–46.6)
HGB BLD-MCNC: 11.4 G/DL (ref 12–15.9)
IMM GRANULOCYTES # BLD AUTO: 0.02 10*3/MM3 (ref 0–0.05)
IMM GRANULOCYTES NFR BLD AUTO: 0.2 % (ref 0–0.5)
LYMPHOCYTES # BLD AUTO: 3.13 10*3/MM3 (ref 0.7–3.1)
LYMPHOCYTES NFR BLD AUTO: 37.2 % (ref 19.6–45.3)
MCH RBC QN AUTO: 30.6 PG (ref 26.6–33)
MCHC RBC AUTO-ENTMCNC: 32.3 G/DL (ref 31.5–35.7)
MCV RBC AUTO: 94.9 FL (ref 79–97)
MONOCYTES # BLD AUTO: 0.47 10*3/MM3 (ref 0.1–0.9)
MONOCYTES NFR BLD AUTO: 5.6 % (ref 5–12)
NEUTROPHILS NFR BLD AUTO: 4.54 10*3/MM3 (ref 1.7–7)
NEUTROPHILS NFR BLD AUTO: 53.9 % (ref 42.7–76)
NRBC BLD AUTO-RTO: 0 /100 WBC (ref 0–0.2)
PLATELET # BLD AUTO: 328 10*3/MM3 (ref 140–450)
PMV BLD AUTO: 10.7 FL (ref 6–12)
RBC # BLD AUTO: 3.72 10*6/MM3 (ref 3.77–5.28)
WBC NRBC COR # BLD AUTO: 8.42 10*3/MM3 (ref 3.4–10.8)

## 2025-06-17 PROCEDURE — 82728 ASSAY OF FERRITIN: CPT

## 2025-06-17 PROCEDURE — 85025 COMPLETE CBC W/AUTO DIFF WBC: CPT

## 2025-06-17 PROCEDURE — 36415 COLL VENOUS BLD VENIPUNCTURE: CPT

## 2025-06-17 NOTE — TELEPHONE ENCOUNTER
Patient left a voicemail she is having severe symptoms like before, no ability to get up and move around, barely can function. Please call.

## 2025-06-17 NOTE — TELEPHONE ENCOUNTER
"Patient c/o \"severe\" brain fog, hard to concentrate.  Patient also c/o \"extreme\" fatigue, migraines (different than her usual migraine), nausea and insomnia.  Patient stated she has had menses for 43 days.  Patient stated symptoms worsened since she was instructed by Dr. Fuentes on 5/21/25 to stop her oral iron and give blood.  Patient stated she gave blood.  She denied any SOA at rest.  She stated she has some SOA with activity but unsure if it is just fatigue.  Patient reports 2 episodes of dizziness since last follow up.  Dr. Fuentes notified and per MD, patient advised to contact her gynecologist and let them know what is going on but she can complete CBC and Ferritin to check her levels.  Patient verbalized understanding and stated she will complete labs today.    "

## 2025-06-18 ENCOUNTER — TELEPHONE (OUTPATIENT)
Dept: ONCOLOGY | Facility: CLINIC | Age: 45
End: 2025-06-18
Payer: MEDICAID

## 2025-06-18 NOTE — TELEPHONE ENCOUNTER
Patient stated she can't function, has constant headache and is starting her oral iron back.  She stated she wants to move follow up with Dr. Fuentes to sooner.  Dr. Fuentes notified and per MD, patient contacted back and was advised and encouraged per MD to get in to see her gynecologist as soon as possible to assess her vaginal bleeding.  Also, per MD, patient advised okay to restart oral iron and follow up with Dr. Fuentes in 4 weeks.  Patient verbalized understanding and agreed.  Message sent to scheduling.

## 2025-06-18 NOTE — TELEPHONE ENCOUNTER
Caller: Sylvia Douglass    Relationship: Self    Best call back number: [phone]    What is the best time to reach you: ANYTIME AFTER 9AM     Who are you requesting to speak with (clinical staff, provider,  specific staff member): CLINICAL NURSE ANANDA OR DR MORALES         What was the call regarding:     DID LAB WORK  YESTERDAY AFTER SPEAKING WITH ANANDA REGARDING HER SYMPTOMS     AFTER GOING THROUGH HER PERSONAL MEDICAL JOURNAL AND RECOGNIZED SYMPTOMS EXPERIENCING NOW  ARE THE SAME SYMPTOMS AS  WHEN WAS SEVERELY ANEMIC. AND CANNOT FUNCTION AT LOW LEVEL OF FERRATIN AS IT PROVIDES A POOR QUALITY OF LIFE FOR HER AND CANNOT FUNCTION     NEED TO DISCUSS A PLAN OF CARE AND IS GOING TO START ORAL IRON AGAIN.     Is it okay if the provider responds through MyChart: NO

## 2025-06-23 ENCOUNTER — HOSPITAL ENCOUNTER (OUTPATIENT)
Facility: HOSPITAL | Age: 45
Discharge: HOME | End: 2025-06-23
Admitting: INTERNAL MEDICINE
Payer: MEDICAID

## 2025-06-23 DIAGNOSIS — E83.119 HEMOCHROMATOSIS, UNSPECIFIED HEMOCHROMATOSIS TYPE: ICD-10-CM

## 2025-06-23 PROCEDURE — 74181 MRI ABDOMEN W/O CONTRAST: CPT

## 2025-07-08 ENCOUNTER — OFFICE VISIT (OUTPATIENT)
Age: 45
End: 2025-07-08
Payer: MEDICAID

## 2025-07-08 VITALS
BODY MASS INDEX: 22.17 KG/M2 | DIASTOLIC BLOOD PRESSURE: 60 MMHG | WEIGHT: 146.3 LBS | HEIGHT: 68 IN | SYSTOLIC BLOOD PRESSURE: 100 MMHG

## 2025-07-08 DIAGNOSIS — M79.645 FINGER PAIN, LEFT: ICD-10-CM

## 2025-07-08 DIAGNOSIS — R22.32 FINGER MASS, LEFT: Primary | ICD-10-CM

## 2025-07-08 NOTE — PROGRESS NOTES
"                                                                 [facility]     Office Visit       Date: 07/08/2025   Patient Name: Sylvia Douglass  MRN: [id number]  YOB: 1980    Referring Physician: Referring, Self     Chief Complaint:   Chief Complaint   Patient presents with    Left Hand - Pain     History of Present Illness:   Sylvia Douglass is a 44 y.o. female right-hand-dominant presented to clinic as new patient with complaints of left index finger mass formation.  She reports that approximately 1 year ago she noticed a small bump to the volar aspect of her index finger.  This has grown in size.  This is painful when bumped or hit.  Pain is rated at 7/10 today.  There is associated stiffness.  There is no numbness or tingling.  No other complaints or concerns.    PMH: Migraines, GERD, anxiety, depression.    Subjective   Review of Systems:   Review of Systems   All other systems reviewed and are negative.     Pertinent review of systems per HPI    I reviewed the patient's chief complaint, history of present illness, review of systems, past medical history, surgical history, family history, social history, medications and allergy list in the EMR on 07/08/2025 and agree with the findings above.    Objective    Vital Signs:   Vitals:    07/08/25 1050   BP: 100/60   Weight: 66.4 kg (146 lb 4.8 oz)   Height: 172.7 cm (68\")     General: No acute distress. Alert and oriented.   Cardiovascular: Palpable radial pulse.   Respiratory: Breathing is nonlabored.   Ortho Exam:  Examination of the left index finger demonstrates a 12 x 12 mm palpable superficial mass along the volar aspect of the index finger overlying the middle phalanx.  This is mobile.  This is mildly tender to palpation.  No surrounding skin lesions.  No significant swelling noted throughout the digit.  She is able to make a full composite fist.  Sensation is intact light touch of the hand.  Warm " well-perfused distally.    Imaging / Studies:    Imaging Results (Last 24 Hours)       Procedure Component Value Units Date/Time    XR Finger 2+ View Left [[id number]] Resulted: 07/08/25 1119     Updated: 07/08/25 1119    Narrative:      Left Index Finger X-Ray    Indication: Pain    Views:  PA, Lateral, and Oblique     Comparison:  None    Findings:  No fracture or dislocation. No bony lesions.  There is a volar soft tissue   mass noted overlying the middle phalanx notable on the lateral view.  No   soft tissue calcifications.  Mild arthritic changes noted at the DIP   joint.            Assessment / Plan    Assessment/Plan:   Sylvia Douglass is a 44 y.o. female with a left index finger volar superficial mass.    I discussed with the patient their clinical and radiographic findings demonstrate a volar superficial mass to the left index finger.  We had a lengthy discussion regarding the pathophysiology of their diagnosis.  Clinically, the cyst likely represents an underlying epidermal inclusion cyst and less likely a giant cell tumor of tendon sheath or retinacular cyst.  I discussed the patient both the operative and nonoperative treatment options.  Nonoperative treatment options include oral topical anti-inflammatories, brace wear, observation.  Operative treatment includes surgical excision.  After expressing understanding of all options, the patient wished to proceed with definitive treatment.  I explained to her the risk, benefits, alternatives and prognosis, she like to proceed with left index finger mass excision.    The risks and benefits of the procedure were discussed with the patient and/or appropriate guardian, which include but are not limited to: the risk of bleeding, pain, infection, wound complications, neurovascular damage, post-operative stiffness, tendon and/or ligament injury, persistent pain, need for additional surgeries in the future, and general risks from anesthesia. Mass excision  specific risks include recurrence, damage to surrounding structures, digital stiffness, wound complications, need for additional procedures.  We also discussed the post-operative rehabilitation, length of immobilization, the need for therapy, and the overall expected outcomes from the procedure. Proper time was given to answer the patient's questions regarding the procedure. The patient expressed understanding. Knowing what the risks are and what the conservative treatment is, the patient elected to forgo any further conservative treatment options and proceed with the surgical intervention. Surgical consent was obtained in the clinic and signed by myself and the patient. They will follow up with me postoperatively.        ICD-10-CM ICD-9-CM   1. Finger mass, left  R22.32 782.2   2. Finger pain, left  M79.645 729.5     Follow Up:   Return for Follow Up- After surgery.      Inge Emerson MD  [facility]

## 2025-07-16 ENCOUNTER — LAB (OUTPATIENT)
Dept: LAB | Facility: HOSPITAL | Age: 45
End: 2025-07-16
Payer: MEDICAID

## 2025-07-16 ENCOUNTER — OFFICE VISIT (OUTPATIENT)
Dept: ONCOLOGY | Facility: CLINIC | Age: 45
End: 2025-07-16
Payer: MEDICAID

## 2025-07-16 VITALS
HEIGHT: 68 IN | TEMPERATURE: 98.2 F | RESPIRATION RATE: 16 BRPM | WEIGHT: 154.1 LBS | HEART RATE: 78 BPM | BODY MASS INDEX: 23.36 KG/M2 | OXYGEN SATURATION: 99 % | DIASTOLIC BLOOD PRESSURE: 73 MMHG | SYSTOLIC BLOOD PRESSURE: 112 MMHG

## 2025-07-16 DIAGNOSIS — K90.9 INTESTINAL MALABSORPTION, UNSPECIFIED TYPE: ICD-10-CM

## 2025-07-16 DIAGNOSIS — E83.119 HEMOCHROMATOSIS, UNSPECIFIED HEMOCHROMATOSIS TYPE: ICD-10-CM

## 2025-07-16 DIAGNOSIS — E83.119 HEMOCHROMATOSIS, UNSPECIFIED HEMOCHROMATOSIS TYPE: Primary | ICD-10-CM

## 2025-07-16 LAB
ALBUMIN SERPL-MCNC: 4.3 G/DL (ref 3.5–5.2)
ALBUMIN/GLOB SERPL: 2 G/DL
ALP SERPL-CCNC: 108 U/L (ref 39–117)
ALT SERPL W P-5'-P-CCNC: 19 U/L (ref 1–33)
ANION GAP SERPL CALCULATED.3IONS-SCNC: 7.8 MMOL/L (ref 5–15)
AST SERPL-CCNC: 26 U/L (ref 1–32)
BASOPHILS # BLD AUTO: 0.04 10*3/MM3 (ref 0–0.2)
BASOPHILS NFR BLD AUTO: 0.5 % (ref 0–1.5)
BILIRUB SERPL-MCNC: <0.2 MG/DL (ref 0–1.2)
BUN SERPL-MCNC: 9.1 MG/DL (ref 6–20)
BUN/CREAT SERPL: 16.9 (ref 7–25)
CALCIUM SPEC-SCNC: 9 MG/DL (ref 8.6–10.5)
CHLORIDE SERPL-SCNC: 100 MMOL/L (ref 98–107)
CO2 SERPL-SCNC: 28.2 MMOL/L (ref 22–29)
CREAT SERPL-MCNC: 0.54 MG/DL (ref 0.57–1)
DEPRECATED RDW RBC AUTO: 47.5 FL (ref 37–54)
EGFRCR SERPLBLD CKD-EPI 2021: 116.6 ML/MIN/1.73
EOSINOPHIL # BLD AUTO: 0.29 10*3/MM3 (ref 0–0.4)
EOSINOPHIL NFR BLD AUTO: 3.3 % (ref 0.3–6.2)
ERYTHROCYTE [DISTWIDTH] IN BLOOD BY AUTOMATED COUNT: 13.4 % (ref 12.3–15.4)
FERRITIN SERPL-MCNC: 17.8 NG/ML (ref 13–150)
GLOBULIN UR ELPH-MCNC: 2.2 GM/DL
GLUCOSE SERPL-MCNC: 92 MG/DL (ref 65–99)
HCT VFR BLD AUTO: 37 % (ref 34–46.6)
HGB BLD-MCNC: 11.8 G/DL (ref 12–15.9)
IMM GRANULOCYTES # BLD AUTO: 0.03 10*3/MM3 (ref 0–0.05)
IMM GRANULOCYTES NFR BLD AUTO: 0.3 % (ref 0–0.5)
IRON 24H UR-MRATE: 47 MCG/DL (ref 37–145)
IRON SATN MFR SERPL: 11 % (ref 20–50)
LYMPHOCYTES # BLD AUTO: 2.6 10*3/MM3 (ref 0.7–3.1)
LYMPHOCYTES NFR BLD AUTO: 29.7 % (ref 19.6–45.3)
MCH RBC QN AUTO: 30.4 PG (ref 26.6–33)
MCHC RBC AUTO-ENTMCNC: 31.9 G/DL (ref 31.5–35.7)
MCV RBC AUTO: 95.4 FL (ref 79–97)
MONOCYTES # BLD AUTO: 0.66 10*3/MM3 (ref 0.1–0.9)
MONOCYTES NFR BLD AUTO: 7.6 % (ref 5–12)
NEUTROPHILS NFR BLD AUTO: 5.12 10*3/MM3 (ref 1.7–7)
NEUTROPHILS NFR BLD AUTO: 58.6 % (ref 42.7–76)
NRBC BLD AUTO-RTO: 0 /100 WBC (ref 0–0.2)
PLATELET # BLD AUTO: 266 10*3/MM3 (ref 140–450)
PMV BLD AUTO: 11.4 FL (ref 6–12)
POTASSIUM SERPL-SCNC: 4.1 MMOL/L (ref 3.5–5.2)
PROT SERPL-MCNC: 6.5 G/DL (ref 6–8.5)
RBC # BLD AUTO: 3.88 10*6/MM3 (ref 3.77–5.28)
SODIUM SERPL-SCNC: 136 MMOL/L (ref 136–145)
TIBC SERPL-MCNC: 435 MCG/DL (ref 298–536)
TRANSFERRIN SERPL-MCNC: 292 MG/DL (ref 200–360)
WBC NRBC COR # BLD AUTO: 8.74 10*3/MM3 (ref 3.4–10.8)

## 2025-07-16 PROCEDURE — 99214 OFFICE O/P EST MOD 30 MIN: CPT | Performed by: INTERNAL MEDICINE

## 2025-07-16 PROCEDURE — 85025 COMPLETE CBC W/AUTO DIFF WBC: CPT

## 2025-07-16 PROCEDURE — 82728 ASSAY OF FERRITIN: CPT

## 2025-07-16 PROCEDURE — 36415 COLL VENOUS BLD VENIPUNCTURE: CPT

## 2025-07-16 PROCEDURE — 1125F AMNT PAIN NOTED PAIN PRSNT: CPT | Performed by: INTERNAL MEDICINE

## 2025-07-16 PROCEDURE — 83540 ASSAY OF IRON: CPT

## 2025-07-16 PROCEDURE — 80053 COMPREHEN METABOLIC PANEL: CPT

## 2025-07-16 PROCEDURE — 84466 ASSAY OF TRANSFERRIN: CPT
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
## 2025-07-21 ENCOUNTER — TRANSCRIBE ORDERS (OUTPATIENT)
Dept: NUTRITION | Facility: HOSPITAL | Age: 45
End: 2025-07-21
Payer: MEDICAID

## 2025-07-21 DIAGNOSIS — R63.5 ABNORMAL WEIGHT GAIN: Primary | ICD-10-CM

## 2025-07-23 ENCOUNTER — TELEPHONE (OUTPATIENT)
Dept: ONCOLOGY | Facility: CLINIC | Age: 45
End: 2025-07-23
Payer: MEDICAID

## 2025-07-23 NOTE — TELEPHONE ENCOUNTER
Patient called she states her symptoms are extreme, and are worsening she needs a call back to discuss. Please call.

## 2025-07-24 ENCOUNTER — TELEPHONE (OUTPATIENT)
Dept: NEUROSURGERY | Facility: CLINIC | Age: 45
End: 2025-07-24
Payer: MEDICAID

## 2025-07-24 DIAGNOSIS — M50.30 DDD (DEGENERATIVE DISC DISEASE), CERVICAL: Primary | ICD-10-CM

## 2025-07-24 DIAGNOSIS — M47.812 CERVICAL SPONDYLOSIS WITHOUT MYELOPATHY: ICD-10-CM

## 2025-07-24 DIAGNOSIS — R20.2 NUMBNESS AND TINGLING OF RIGHT ARM: ICD-10-CM

## 2025-07-24 DIAGNOSIS — R51.9 CHRONIC NONINTRACTABLE HEADACHE, UNSPECIFIED HEADACHE TYPE: ICD-10-CM

## 2025-07-24 DIAGNOSIS — R20.0 NUMBNESS AND TINGLING OF RIGHT ARM: ICD-10-CM

## 2025-07-24 DIAGNOSIS — G89.29 CHRONIC NONINTRACTABLE HEADACHE, UNSPECIFIED HEADACHE TYPE: ICD-10-CM

## 2025-07-24 DIAGNOSIS — M54.12 CERVICAL RADICULOPATHY: ICD-10-CM

## 2025-07-24 NOTE — TELEPHONE ENCOUNTER
Advised patient per Dr. Fuentes.  She verbalized that she has seen PCP and gynecology within the last 2 weeks and they both have no answers for her symptoms. She stated she saw endocrinology on 7-10-25 and they don't have any answers for her symptoms but agreed to do a test on her cortisol levels, that has not yet been completed.  She stated she wants some answers as to why she is feeling so poorly (fatigued and brain fog) and would like further direction from Dr. Fuentes.  Advised her RN will discuss with MD and return her call.

## 2025-07-24 NOTE — TELEPHONE ENCOUNTER
"Late entry:  Spoke with patient on 7-23-25 at 13:29.  She stated her headaches are better, dizziness better.  Patient stated her fatigue is still really bad, fluctuates with menstrual cycle, has brain fog and \"can't function.\"  Patient stated she cannot tolerate taking 2 iron tablets daily, can only take one.  Patient asked if she can get iron infusions now. Patient stated she does get some SOA with activity and some chest tightness that has been consistent over the last week but she thinks the sensation in her chest is muscular related.  RN advised patient at that time that if she is having new SOA or chest pain, she should go to the ER for evaluation. Patient verbalized understanding.  Spoke with Dr. Fuentes and called patient back 7-24-25 at 08:22 to advise that Dr. Fuentes does not recommend iron infusions and that her iron and hemoglobin have increased and that if she is feeling worse, she should get in to see her PCP to be evaluated for other causes.  Also, complete labs in 6 weeks and keep follow up with this office scheduled in October, 2025.  No answer received today.  Left  requesting return call.  "

## 2025-07-24 NOTE — TELEPHONE ENCOUNTER
Provider:  [facility]  Surgery/Procedure:  EMG & Nerve Conduction Test   Surgery/Procedure Date:  2-1-24  Last visit:   Office Visit with Lani Pablo PA-C (05/13/2025)   Next visit: 3 years     Reason for call: Patient would like a new order for PT, if this is ok I can put another one in. It did help her before.  Please advise. Thank you.

## 2025-07-24 NOTE — TELEPHONE ENCOUNTER
Dr. Fuentes notified of follow up call conversation with patient.  Advised patient per Dr. Fuentes that there is nothing else that she can add now as her counts are improving.  Patient verbalized understanding and stated that she is currently at her PCP office and will let them know.

## 2025-07-28 ENCOUNTER — HOSPITAL ENCOUNTER (EMERGENCY)
Facility: HOSPITAL | Age: 45
Discharge: HOME | End: 2025-07-28
Attending: EMERGENCY MEDICINE | Admitting: EMERGENCY MEDICINE
Payer: MEDICAID

## 2025-07-28 ENCOUNTER — APPOINTMENT (OUTPATIENT)
Facility: HOSPITAL | Age: 45
End: 2025-07-28
Payer: MEDICAID

## 2025-07-28 VITALS
HEART RATE: 86 BPM | RESPIRATION RATE: 18 BRPM | OXYGEN SATURATION: 100 % | HEIGHT: 68 IN | WEIGHT: 149.91 LBS | TEMPERATURE: 98.1 F | DIASTOLIC BLOOD PRESSURE: 78 MMHG | BODY MASS INDEX: 22.72 KG/M2 | SYSTOLIC BLOOD PRESSURE: 117 MMHG

## 2025-07-28 DIAGNOSIS — S92.912A UNSPECIFIED FRACTURE OF LEFT TOE(S), INITIAL ENCOUNTER FOR CLOSED FRACTURE: Primary | ICD-10-CM

## 2025-07-28 PROCEDURE — 99283 EMERGENCY DEPT VISIT LOW MDM: CPT | Performed by: EMERGENCY MEDICINE

## 2025-07-28 PROCEDURE — 73630 X-RAY EXAM OF FOOT: CPT

## 2025-07-28 RX ORDER — HYDROCODONE BITARTRATE AND ACETAMINOPHEN 5; 325 MG/1; MG/1
1 TABLET ORAL ONCE
Refills: 0 | Status: DISCONTINUED | OUTPATIENT
Start: 2025-07-28 | End: 2025-07-28 | Stop reason: DRUGHIGH

## 2025-07-28 RX ORDER — HYDROCODONE BITARTRATE AND ACETAMINOPHEN 7.5; 325 MG/1; MG/1
1 TABLET ORAL ONCE
Refills: 0 | Status: COMPLETED | OUTPATIENT
Start: 2025-07-28 | End: 2025-07-28

## 2025-07-28 RX ORDER — HYDROCODONE BITARTRATE AND ACETAMINOPHEN 7.5; 325 MG/1; MG/1
1 TABLET ORAL ONCE
Refills: 0 | Status: DISCONTINUED | OUTPATIENT
Start: 2025-07-28 | End: 2025-07-28

## 2025-07-28 RX ADMIN — HYDROCODONE BITARTRATE AND ACETAMINOPHEN 1 TABLET: 7.5; 325 TABLET ORAL at 18:19

## 2025-07-28 NOTE — DISCHARGE INSTRUCTIONS
Take previously prescribed pain medication.  You may also try a topical pain reliever like diclofenac gel as you are not able to tolerate NSAIDs by mouth.  Elevate and apply ice as needed.  Follow-up with your podiatrist as previously scheduled on Wednesday.  Keep walking boot in place.
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
## 2025-07-28 NOTE — Clinical Note
[facility] EMERGENCY DEPARTMENT [place]  [address]   [place] [address]  Phone: [phone]  Fax: [phone]    Sylvia Douglass was seen and treated in our emergency department on 7/28/2025.  She may return to work on 07/30/2025.         Thank you for choosing [facility].    Sharmila Ray PA

## 2025-08-15 ENCOUNTER — DOCUMENTATION (OUTPATIENT)
Dept: ORTHOPEDIC SURGERY | Facility: CLINIC | Age: 45
End: 2025-08-15

## 2025-08-15 ENCOUNTER — OUTSIDE FACILITY SERVICE (OUTPATIENT)
Dept: ORTHOPEDIC SURGERY | Facility: CLINIC | Age: 45
End: 2025-08-15
Payer: MEDICAID

## 2025-08-15 ENCOUNTER — LAB REQUISITION (OUTPATIENT)
Dept: LAB | Facility: HOSPITAL | Age: 45
End: 2025-08-15
Payer: MEDICAID

## 2025-08-15 DIAGNOSIS — R22.32 LOCALIZED SWELLING, MASS AND LUMP, LEFT UPPER LIMB: ICD-10-CM

## 2025-08-15 PROCEDURE — 88304 TISSUE EXAM BY PATHOLOGIST: CPT | Performed by: STUDENT IN AN ORGANIZED HEALTH CARE EDUCATION/TRAINING PROGRAM

## 2025-08-20 ENCOUNTER — TELEPHONE (OUTPATIENT)
Dept: ORTHOPEDIC SURGERY | Facility: CLINIC | Age: 45
End: 2025-08-20
Payer: MEDICAID

## 2025-08-25 ENCOUNTER — OFFICE VISIT (OUTPATIENT)
Dept: ENDOCRINOLOGY | Facility: CLINIC | Age: 45
End: 2025-08-25
Payer: MEDICAID

## 2025-08-25 VITALS
WEIGHT: 150 LBS | HEART RATE: 86 BPM | DIASTOLIC BLOOD PRESSURE: 70 MMHG | BODY MASS INDEX: 22.73 KG/M2 | SYSTOLIC BLOOD PRESSURE: 124 MMHG | HEIGHT: 68 IN

## 2025-08-25 DIAGNOSIS — Z98.84 H/O GASTRIC BYPASS: ICD-10-CM

## 2025-08-25 DIAGNOSIS — R79.89 LOW SERUM CORTISOL LEVEL: Primary | ICD-10-CM

## 2025-08-25 PROCEDURE — 99204 OFFICE O/P NEW MOD 45 MIN: CPT | Performed by: INTERNAL MEDICINE

## 2025-08-25 PROCEDURE — 1160F RVW MEDS BY RX/DR IN RCRD: CPT | Performed by: INTERNAL MEDICINE

## 2025-08-25 PROCEDURE — 1159F MED LIST DOCD IN RCRD: CPT | Performed by: INTERNAL MEDICINE

## 2025-08-25 RX ORDER — COSYNTROPIN 0.25 MG/ML
0.25 INJECTION, POWDER, FOR SOLUTION INTRAMUSCULAR; INTRAVENOUS ONCE
Status: DISCONTINUED | OUTPATIENT
Start: 2025-08-25 | End: 2025-08-25

## 2025-08-25 RX ORDER — FAMOTIDINE 10 MG
TABLET ORAL
COMMUNITY
Start: 2025-08-24

## 2025-08-26 ENCOUNTER — LAB (OUTPATIENT)
Facility: HOSPITAL | Age: 45
End: 2025-08-26
Payer: MEDICAID

## 2025-08-26 ENCOUNTER — OFFICE VISIT (OUTPATIENT)
Age: 45
End: 2025-08-26
Payer: MEDICAID

## 2025-08-26 VITALS — TEMPERATURE: 97.6 F

## 2025-08-26 DIAGNOSIS — Z09 SURGERY FOLLOW-UP: Primary | ICD-10-CM

## 2025-08-26 DIAGNOSIS — E83.119 HEMOCHROMATOSIS, UNSPECIFIED HEMOCHROMATOSIS TYPE: ICD-10-CM

## 2025-08-26 DIAGNOSIS — R22.32 FINGER MASS, LEFT: ICD-10-CM

## 2025-08-26 DIAGNOSIS — K90.9 INTESTINAL MALABSORPTION, UNSPECIFIED TYPE: ICD-10-CM

## 2025-08-26 LAB
BASOPHILS # BLD AUTO: 0.01 10*3/MM3 (ref 0–0.2)
BASOPHILS NFR BLD AUTO: 0.1 % (ref 0–1.5)
DEPRECATED RDW RBC AUTO: 45.9 FL (ref 37–54)
EOSINOPHIL # BLD AUTO: 0.19 10*3/MM3 (ref 0–0.4)
EOSINOPHIL NFR BLD AUTO: 2.5 % (ref 0.3–6.2)
ERYTHROCYTE [DISTWIDTH] IN BLOOD BY AUTOMATED COUNT: 13.5 % (ref 12.3–15.4)
FERRITIN SERPL-MCNC: 122 NG/ML (ref 13–150)
HCT VFR BLD AUTO: 39.2 % (ref 34–46.6)
HGB BLD-MCNC: 13.1 G/DL (ref 12–15.9)
IMM GRANULOCYTES # BLD AUTO: 0.02 10*3/MM3 (ref 0–0.05)
IMM GRANULOCYTES NFR BLD AUTO: 0.3 % (ref 0–0.5)
LYMPHOCYTES # BLD AUTO: 2.81 10*3/MM3 (ref 0.7–3.1)
LYMPHOCYTES NFR BLD AUTO: 37 % (ref 19.6–45.3)
MCH RBC QN AUTO: 31.1 PG (ref 26.6–33)
MCHC RBC AUTO-ENTMCNC: 33.4 G/DL (ref 31.5–35.7)
MCV RBC AUTO: 93.1 FL (ref 79–97)
MONOCYTES # BLD AUTO: 0.52 10*3/MM3 (ref 0.1–0.9)
MONOCYTES NFR BLD AUTO: 6.8 % (ref 5–12)
NEUTROPHILS NFR BLD AUTO: 4.05 10*3/MM3 (ref 1.7–7)
NEUTROPHILS NFR BLD AUTO: 53.3 % (ref 42.7–76)
PLATELET # BLD AUTO: 299 10*3/MM3 (ref 140–450)
PMV BLD AUTO: 11.2 FL (ref 6–12)
RBC # BLD AUTO: 4.21 10*6/MM3 (ref 3.77–5.28)
WBC NRBC COR # BLD AUTO: 7.6 10*3/MM3 (ref 3.4–10.8)

## 2025-08-26 PROCEDURE — 85025 COMPLETE CBC W/AUTO DIFF WBC: CPT

## 2025-08-26 PROCEDURE — 36415 COLL VENOUS BLD VENIPUNCTURE: CPT

## 2025-08-26 PROCEDURE — 99024 POSTOP FOLLOW-UP VISIT: CPT

## 2025-08-26 PROCEDURE — 82728 ASSAY OF FERRITIN: CPT

## 2025-08-28 ENCOUNTER — TELEPHONE (OUTPATIENT)
Dept: ENDOCRINOLOGY | Facility: CLINIC | Age: 45
End: 2025-08-28
Payer: MEDICAID

## 2025-08-29 ENCOUNTER — LAB (OUTPATIENT)
Facility: HOSPITAL | Age: 45
End: 2025-08-29
Payer: MEDICAID

## 2025-08-29 DIAGNOSIS — R79.89 LOW SERUM CORTISOL LEVEL: ICD-10-CM

## 2025-08-29 LAB
25(OH)D3 SERPL-MCNC: 42.2 NG/ML (ref 30–100)
CORTIS SERPL-MCNC: 17.6 MCG/DL
ESTRADIOL SERPL HS-MCNC: 235 PG/ML
FSH SERPL-ACNC: 6 MIU/ML
HBA1C MFR BLD: 5.4 % (ref 4.8–5.6)
PROLACTIN SERPL-MCNC: 26.1 NG/ML (ref 4.79–23.3)
T4 FREE SERPL-MCNC: 1.22 NG/DL (ref 0.92–1.68)
TESTOST SERPL-MCNC: 26.3 NG/DL (ref 8.4–48.1)
TSH SERPL DL<=0.05 MIU/L-ACNC: 1.43 UIU/ML (ref 0.27–4.2)
VIT B12 BLD-MCNC: 1203 PG/ML (ref 211–946)

## 2025-08-29 PROCEDURE — 83036 HEMOGLOBIN GLYCOSYLATED A1C: CPT

## 2025-08-29 PROCEDURE — 84439 ASSAY OF FREE THYROXINE: CPT

## 2025-08-29 PROCEDURE — 82088 ASSAY OF ALDOSTERONE: CPT

## 2025-08-29 PROCEDURE — 83001 ASSAY OF GONADOTROPIN (FSH): CPT

## 2025-08-29 PROCEDURE — 82670 ASSAY OF TOTAL ESTRADIOL: CPT

## 2025-08-29 PROCEDURE — 82633 DESOXYCORTICOSTERONE: CPT

## 2025-08-29 PROCEDURE — 82607 VITAMIN B-12: CPT

## 2025-08-29 PROCEDURE — 82533 TOTAL CORTISOL: CPT

## 2025-08-29 PROCEDURE — 82024 ASSAY OF ACTH: CPT

## 2025-08-29 PROCEDURE — 84443 ASSAY THYROID STIM HORMONE: CPT

## 2025-08-29 PROCEDURE — 84403 ASSAY OF TOTAL TESTOSTERONE: CPT

## 2025-08-29 PROCEDURE — 82306 VITAMIN D 25 HYDROXY: CPT

## 2025-08-29 PROCEDURE — 36415 COLL VENOUS BLD VENIPUNCTURE: CPT

## 2025-08-29 PROCEDURE — 84305 ASSAY OF SOMATOMEDIN: CPT

## 2025-08-29 PROCEDURE — 84244 ASSAY OF RENIN: CPT

## 2025-08-29 PROCEDURE — 82627 DEHYDROEPIANDROSTERONE: CPT

## 2025-08-29 PROCEDURE — 84146 ASSAY OF PROLACTIN: CPT

## 2025-08-30 LAB — DHEA-S SERPL-MCNC: 131 UG/DL (ref 41.2–243.7)

## (undated) DEVICE — BLD CUT FORMLA AGGR ANGL 4MM

## (undated) DEVICE — GLV SURG SENSICARE PI LF PF 7.0

## (undated) DEVICE — BNDG ESMARK 6INX9FT STRL

## (undated) DEVICE — SUT ETHLN 3/0 FS1 30IN 669H

## (undated) DEVICE — GLV SURG SENSICARE PI MIC PF SZ8 LF STRL

## (undated) DEVICE — PK ARTHSCP KN 10

## (undated) DEVICE — GLV SURG SENSICARE PI LF PF 6.5

## (undated) DEVICE — GLV SURG PREMIERPRO MIC LTX PF SZ6.5 BRN

## (undated) DEVICE — STRAP POSTN KN/BDY FM 5X72IN DISP

## (undated) DEVICE — NDL HYPO ECLPS SFTY 22G 1 1/2IN

## (undated) DEVICE — T-DRAPE,EXTREMITY,STERILE: Brand: MEDLINE

## (undated) DEVICE — GLV SURG SENSICARE PI LF PF 7.5

## (undated) DEVICE — GLV SURG PREMIERPRO MIC LTX PF SZ7 BRN

## (undated) DEVICE — TB CASSET ARTHSCP CROSSFLOW INFLOW LF

## (undated) DEVICE — PAD ARMBRD SURG CONVOL 7.5X20X2IN

## (undated) DEVICE — GLV SURG SENSICARE PI MIC PF SZ7.5 LF STRL

## (undated) DEVICE — Device

## (undated) DEVICE — GLV SURG PREMIERPRO MIC LTX PF SZ7.5 BRN

## (undated) DEVICE — UNDERCAST PADDING: Brand: DEROYAL

## (undated) DEVICE — BLANKT WARM UPPR/BDY ARM/OUT 57X196CM

## (undated) DEVICE — TP MICROFM 4IN LF

## (undated) DEVICE — DRSNG GZ PETROLTM XEROFORM CURAD 1X8IN STRL

## (undated) DEVICE — DRSNG PAD ABD 8X10IN STRL